# Patient Record
Sex: MALE | Race: WHITE | NOT HISPANIC OR LATINO | Employment: OTHER | URBAN - METROPOLITAN AREA
[De-identification: names, ages, dates, MRNs, and addresses within clinical notes are randomized per-mention and may not be internally consistent; named-entity substitution may affect disease eponyms.]

---

## 2023-03-03 PROBLEM — L03.90 CELLULITIS: Status: ACTIVE | Noted: 2023-03-03

## 2023-03-03 PROBLEM — E11.9 DIABETES MELLITUS (MULTI): Status: ACTIVE | Noted: 2023-03-03

## 2023-03-03 PROBLEM — G62.9 PERIPHERAL NEUROPATHY: Status: ACTIVE | Noted: 2023-03-03

## 2023-03-03 PROBLEM — I10 HTN (HYPERTENSION): Status: ACTIVE | Noted: 2023-03-03

## 2023-03-03 PROBLEM — R11.2 NAUSEA AND/OR VOMITING: Status: ACTIVE | Noted: 2023-03-03

## 2023-03-03 PROBLEM — K22.0 ACHALASIA, ESOPHAGEAL: Status: ACTIVE | Noted: 2023-03-03

## 2023-03-03 PROBLEM — R42 DIZZINESS: Status: ACTIVE | Noted: 2023-03-03

## 2023-03-03 PROBLEM — I95.9 HYPOTENSION: Status: ACTIVE | Noted: 2023-03-03

## 2023-03-03 PROBLEM — I50.30 (HFPEF) HEART FAILURE WITH PRESERVED EJECTION FRACTION (MULTI): Status: ACTIVE | Noted: 2023-03-03

## 2023-03-03 PROBLEM — Z96.1 PSEUDOPHAKIA OF LEFT EYE: Status: ACTIVE | Noted: 2023-03-03

## 2023-03-03 PROBLEM — H33.302 RETINAL HOLE OR TEAR, LEFT: Status: ACTIVE | Noted: 2023-03-03

## 2023-03-03 PROBLEM — S05.71XA: Status: ACTIVE | Noted: 2023-03-03

## 2023-03-03 PROBLEM — M25.569 PAIN IN JOINT, LOWER LEG: Status: ACTIVE | Noted: 2023-03-03

## 2023-03-03 PROBLEM — E78.2 COMBINED HYPERLIPIDEMIA: Status: ACTIVE | Noted: 2023-03-03

## 2023-03-03 PROBLEM — M79.673 FOOT PAIN: Status: ACTIVE | Noted: 2023-03-03

## 2023-03-03 PROBLEM — K59.09 CONSTIPATION, CHRONIC: Status: ACTIVE | Noted: 2023-03-03

## 2023-03-03 PROBLEM — I48.91 A-FIB (MULTI): Status: ACTIVE | Noted: 2023-03-03

## 2023-03-03 RX ORDER — SERTRALINE HYDROCHLORIDE 200 MG/1
CAPSULE ORAL
COMMUNITY
End: 2023-10-31 | Stop reason: ALTCHOICE

## 2023-03-03 RX ORDER — MAGNESIUM OXIDE 420 MG/1
TABLET ORAL
COMMUNITY
Start: 2022-04-11 | End: 2023-10-31 | Stop reason: SDUPTHER

## 2023-03-03 RX ORDER — SPIRONOLACTONE 50 MG/1
1 TABLET, FILM COATED ORAL DAILY
COMMUNITY
Start: 2022-04-11 | End: 2024-02-22 | Stop reason: HOSPADM

## 2023-03-03 RX ORDER — POTASSIUM CHLORIDE 20 MEQ/1
2 TABLET, EXTENDED RELEASE ORAL 2 TIMES DAILY
COMMUNITY
Start: 2022-04-11

## 2023-03-03 RX ORDER — BISACODYL 10 MG/1
10 SUPPOSITORY RECTAL DAILY PRN
COMMUNITY
End: 2023-10-31 | Stop reason: ALTCHOICE

## 2023-03-03 RX ORDER — DIAZEPAM 5 MG/1
10 TABLET ORAL NIGHTLY PRN
COMMUNITY
Start: 2022-04-11 | End: 2023-03-21 | Stop reason: ALTCHOICE

## 2023-03-03 RX ORDER — TAMSULOSIN HYDROCHLORIDE 0.4 MG/1
0.8 CAPSULE ORAL NIGHTLY
COMMUNITY

## 2023-03-03 RX ORDER — FUROSEMIDE 40 MG/1
1 TABLET ORAL DAILY
COMMUNITY
End: 2023-07-18 | Stop reason: SDUPTHER

## 2023-03-03 RX ORDER — METFORMIN HYDROCHLORIDE 1000 MG/1
1000 TABLET ORAL
COMMUNITY
End: 2023-10-31 | Stop reason: ALTCHOICE

## 2023-03-03 RX ORDER — DIGOXIN 125 MCG
1 TABLET ORAL DAILY
COMMUNITY
Start: 2022-04-11

## 2023-03-03 RX ORDER — CHOLECALCIFEROL (VITAMIN D3) 25 MCG
2 TABLET ORAL DAILY
COMMUNITY
Start: 2022-04-11 | End: 2024-06-02 | Stop reason: HOSPADM

## 2023-03-03 RX ORDER — POLYETHYLENE GLYCOL 3350 17 G/17G
POWDER, FOR SOLUTION ORAL
COMMUNITY
End: 2023-10-31 | Stop reason: ALTCHOICE

## 2023-03-03 RX ORDER — DOCUSATE SODIUM 100 MG/1
2 CAPSULE, LIQUID FILLED ORAL EVERY MORNING
COMMUNITY

## 2023-03-03 RX ORDER — ROSUVASTATIN CALCIUM 20 MG/1
1 TABLET, COATED ORAL NIGHTLY
COMMUNITY
Start: 2022-09-20

## 2023-03-03 RX ORDER — WARFARIN SODIUM 5 MG/1
12.5 TABLET ORAL
COMMUNITY

## 2023-03-03 RX ORDER — OXYCODONE AND ACETAMINOPHEN 5; 325 MG/1; MG/1
1 TABLET ORAL EVERY 8 HOURS PRN
COMMUNITY
End: 2023-07-18

## 2023-03-03 RX ORDER — GABAPENTIN 100 MG/1
100 CAPSULE ORAL 2 TIMES DAILY
COMMUNITY
End: 2023-05-15 | Stop reason: SDUPTHER

## 2023-03-03 RX ORDER — METOLAZONE 2.5 MG/1
1 TABLET ORAL DAILY
COMMUNITY
Start: 2022-06-22 | End: 2023-07-18

## 2023-03-14 LAB
INR IN PPP BY COAGULATION ASSAY: 2 (ref 0.9–1.1)
PROTHROMBIN TIME (PT) IN PPP BY COAGULATION ASSAY: 23.4 SEC (ref 9.8–13.4)

## 2023-03-21 ENCOUNTER — OFFICE VISIT (OUTPATIENT)
Dept: PRIMARY CARE | Facility: CLINIC | Age: 63
End: 2023-03-21
Payer: MEDICARE

## 2023-03-21 VITALS
HEIGHT: 74 IN | OXYGEN SATURATION: 94 % | BODY MASS INDEX: 44.04 KG/M2 | HEART RATE: 63 BPM | DIASTOLIC BLOOD PRESSURE: 60 MMHG | SYSTOLIC BLOOD PRESSURE: 110 MMHG

## 2023-03-21 DIAGNOSIS — R09.82 POST-NASAL DRIP: ICD-10-CM

## 2023-03-21 DIAGNOSIS — E79.0 ELEVATED URIC ACID IN BLOOD: ICD-10-CM

## 2023-03-21 DIAGNOSIS — J06.9 VIRAL UPPER RESPIRATORY TRACT INFECTION: Primary | ICD-10-CM

## 2023-03-21 PROCEDURE — 3078F DIAST BP <80 MM HG: CPT | Performed by: STUDENT IN AN ORGANIZED HEALTH CARE EDUCATION/TRAINING PROGRAM

## 2023-03-21 PROCEDURE — 3074F SYST BP LT 130 MM HG: CPT | Performed by: STUDENT IN AN ORGANIZED HEALTH CARE EDUCATION/TRAINING PROGRAM

## 2023-03-21 PROCEDURE — 99213 OFFICE O/P EST LOW 20 MIN: CPT | Performed by: STUDENT IN AN ORGANIZED HEALTH CARE EDUCATION/TRAINING PROGRAM

## 2023-03-21 PROCEDURE — 3051F HG A1C>EQUAL 7.0%<8.0%: CPT | Performed by: STUDENT IN AN ORGANIZED HEALTH CARE EDUCATION/TRAINING PROGRAM

## 2023-03-21 PROCEDURE — 1036F TOBACCO NON-USER: CPT | Performed by: STUDENT IN AN ORGANIZED HEALTH CARE EDUCATION/TRAINING PROGRAM

## 2023-03-21 RX ORDER — LORATADINE 10 MG/1
10 TABLET ORAL DAILY
Qty: 90 TABLET | Refills: 1 | Status: SHIPPED
Start: 2023-03-21 | End: 2023-10-31 | Stop reason: ALTCHOICE

## 2023-03-21 RX ORDER — POTASSIUM CITRATE 10 MEQ/1
TABLET, EXTENDED RELEASE ORAL
COMMUNITY
End: 2023-07-18 | Stop reason: ALTCHOICE

## 2023-03-21 RX ORDER — ONDANSETRON 4 MG/1
TABLET, ORALLY DISINTEGRATING ORAL
COMMUNITY
Start: 2022-09-18 | End: 2023-10-31 | Stop reason: ALTCHOICE

## 2023-03-21 RX ORDER — FLUTICASONE PROPIONATE 50 MCG
2 SPRAY, SUSPENSION (ML) NASAL DAILY
Qty: 16 G | Refills: 2 | Status: SHIPPED
Start: 2023-03-21 | End: 2023-10-31 | Stop reason: ALTCHOICE

## 2023-03-21 RX ORDER — AMOXICILLIN AND CLAVULANATE POTASSIUM 875; 125 MG/1; MG/1
875 TABLET, FILM COATED ORAL 2 TIMES DAILY
Qty: 14 TABLET | Refills: 0 | Status: SHIPPED | OUTPATIENT
Start: 2023-03-21 | End: 2023-03-28

## 2023-03-21 RX ORDER — MULTIVITAMIN
1 TABLET ORAL DAILY
COMMUNITY

## 2023-03-21 ASSESSMENT — ENCOUNTER SYMPTOMS
CARDIOVASCULAR NEGATIVE: 1
SINUS PAIN: 1
PSYCHIATRIC NEGATIVE: 1
RESPIRATORY NEGATIVE: 1
FACIAL SWELLING: 1
CONSTITUTIONAL NEGATIVE: 1
MUSCULOSKELETAL NEGATIVE: 1
SINUS PRESSURE: 1
GASTROINTESTINAL NEGATIVE: 1
NEUROLOGICAL NEGATIVE: 1

## 2023-03-21 NOTE — PROGRESS NOTES
Subjective   Patient ID: Nasir Sandoval is a 62 y.o. male who presents for Follow-up (URIC ACID LEVEL IS ELEVATED).    Patient seen on evaluation.  Regards that he has a couple of questions and concerns.  States that his uric acid levels have been elevated.  Has concerns about potential gout.  He regards that he has not had any gout flares though.  Would like his levels rechecked.  Has been trying to avoid uric acid rich foods as he used to get uric acid stones.  Also regards some sinus pressure and congestion.  Does have a history of sinusitis.  Reports some sinus pressure.  Regards no additional issues or concerns         Review of Systems   Constitutional: Negative.    HENT:  Positive for ear discharge, facial swelling, postnasal drip, sinus pressure and sinus pain.    Respiratory: Negative.     Cardiovascular: Negative.    Gastrointestinal: Negative.    Musculoskeletal: Negative.    Neurological: Negative.    Psychiatric/Behavioral: Negative.         Objective   There were no vitals taken for this visit.    Physical Exam  Vitals and nursing note reviewed.   Constitutional:       General: He is not in acute distress.     Appearance: Normal appearance.   HENT:      Mouth/Throat:      Mouth: Mucous membranes are moist.      Pharynx: No oropharyngeal exudate.      Comments: Sinus pressure, congestion  Eyes:      Extraocular Movements: Extraocular movements intact.      Pupils: Pupils are equal, round, and reactive to light.   Cardiovascular:      Rate and Rhythm: Normal rate and regular rhythm.      Pulses: Normal pulses.      Heart sounds: Normal heart sounds. No murmur heard.  Pulmonary:      Effort: Pulmonary effort is normal. No respiratory distress.   Abdominal:      General: Abdomen is flat. Bowel sounds are normal. There is no distension.      Tenderness: There is no abdominal tenderness.   Musculoskeletal:         General: Normal range of motion.      Right lower leg: No edema.      Left lower leg: No edema.    Skin:     General: Skin is warm and dry.      Capillary Refill: Capillary refill takes less than 2 seconds.   Neurological:      General: No focal deficit present.      Mental Status: He is alert. Mental status is at baseline.      Cranial Nerves: No cranial nerve deficit.      Motor: No weakness.   Psychiatric:         Mood and Affect: Mood normal.         Thought Content: Thought content normal.         Assessment/Plan   Problem List Items Addressed This Visit    None  Visit Diagnoses       Viral upper respiratory tract infection    -  Primary    Relevant Medications    fluticasone (Flonase) 50 mcg/actuation nasal spray    loratadine (Claritin) 10 mg tablet    Post-nasal drip        Relevant Medications    amoxicillin-pot clavulanate (Augmentin) 875-125 mg tablet    Elevated uric acid in blood        Relevant Orders    Uric acid             PT seen on follow up    #URI  Suspected has been having longesvitiy of symptoms  Recommend fluticasone, claritin, advise augmentin if no improvement  Could consider ENT referral    #elevated uric acid  #gout  Noted elevated at 11.1 on last examination, recommend repeat checking could consider allopurinol or antigout gout medication in future, will discuss with labs    #Diabetes  #Insomnia  #Neuropathy  #Hypertension  #BPH  [facility], encourage management, he does inquire about insomnia medicine such as Valium, was advised doxepin by psychiatrist advised him to discuss with his at home psychiatrist    #Health maintenance  Labs reviewed  Advise age-appropriate vaccinations  Up-to-date on routine testing    Return to care in 3 to 4 months or as needed

## 2023-04-05 ENCOUNTER — TELEPHONE (OUTPATIENT)
Dept: PRIMARY CARE | Facility: CLINIC | Age: 63
End: 2023-04-05
Payer: MEDICARE

## 2023-04-05 DIAGNOSIS — R42 DIZZINESS: Primary | ICD-10-CM

## 2023-04-05 NOTE — TELEPHONE ENCOUNTER
Patient asking for a referral to neurology due to dizziness, he says he spoke to you about it at his visit

## 2023-04-10 ENCOUNTER — LAB (OUTPATIENT)
Dept: LAB | Facility: LAB | Age: 63
End: 2023-04-10
Payer: MEDICARE

## 2023-04-10 DIAGNOSIS — E79.0 ELEVATED URIC ACID IN BLOOD: ICD-10-CM

## 2023-04-10 LAB
ALBUMIN (G/DL) IN SER/PLAS: 4.3 G/DL (ref 3.4–5)
ANION GAP IN SER/PLAS: 19 MMOL/L (ref 10–20)
CALCIUM (MG/DL) IN SER/PLAS: 9.7 MG/DL (ref 8.6–10.3)
CARBON DIOXIDE, TOTAL (MMOL/L) IN SER/PLAS: 30 MMOL/L (ref 21–32)
CHLORIDE (MMOL/L) IN SER/PLAS: 95 MMOL/L (ref 98–107)
CREATININE (MG/DL) IN SER/PLAS: 1.32 MG/DL (ref 0.5–1.3)
GFR MALE: 61 ML/MIN/1.73M2
GLUCOSE (MG/DL) IN SER/PLAS: 87 MG/DL (ref 74–99)
INR IN PPP BY COAGULATION ASSAY: 2 (ref 0.9–1.1)
PHOSPHATE (MG/DL) IN SER/PLAS: 4 MG/DL (ref 2.5–4.9)
POTASSIUM (MMOL/L) IN SER/PLAS: 3.5 MMOL/L (ref 3.5–5.3)
PROTHROMBIN TIME (PT) IN PPP BY COAGULATION ASSAY: 23.5 SEC (ref 9.8–13.4)
SODIUM (MMOL/L) IN SER/PLAS: 140 MMOL/L (ref 136–145)
URATE (MG/DL) IN SER/PLAS: 12 MG/DL (ref 4–7.5)
UREA NITROGEN (MG/DL) IN SER/PLAS: 36 MG/DL (ref 6–23)

## 2023-04-10 PROCEDURE — 84550 ASSAY OF BLOOD/URIC ACID: CPT

## 2023-04-10 PROCEDURE — 36415 COLL VENOUS BLD VENIPUNCTURE: CPT | Performed by: STUDENT IN AN ORGANIZED HEALTH CARE EDUCATION/TRAINING PROGRAM

## 2023-04-17 ENCOUNTER — TELEPHONE (OUTPATIENT)
Dept: PRIMARY CARE | Facility: CLINIC | Age: 63
End: 2023-04-17
Payer: MEDICARE

## 2023-04-17 DIAGNOSIS — R21 RASH: Primary | ICD-10-CM

## 2023-04-17 DIAGNOSIS — M10.9 GOUT, UNSPECIFIED CAUSE, UNSPECIFIED CHRONICITY, UNSPECIFIED SITE: ICD-10-CM

## 2023-04-18 RX ORDER — BETAMETHASONE DIPROPIONATE 0.5 MG/G
LOTION TOPICAL 2 TIMES DAILY PRN
Qty: 60 ML | Refills: 1 | Status: SHIPPED | OUTPATIENT
Start: 2023-04-18 | End: 2023-07-18 | Stop reason: SDUPTHER

## 2023-04-18 RX ORDER — ALLOPURINOL 100 MG/1
100 TABLET ORAL DAILY
Qty: 90 TABLET | Refills: 0 | Status: SHIPPED | OUTPATIENT
Start: 2023-04-18 | End: 2023-05-15 | Stop reason: SDUPTHER

## 2023-05-02 ENCOUNTER — TELEPHONE (OUTPATIENT)
Dept: PRIMARY CARE | Facility: CLINIC | Age: 63
End: 2023-05-02
Payer: MEDICARE

## 2023-05-02 DIAGNOSIS — L98.9 SKIN LESION: Primary | ICD-10-CM

## 2023-05-02 NOTE — TELEPHONE ENCOUNTER
Saw you 3/21/23. Wants referral to dermatology for rash/spots on arms and wants repeat Uric acid test.

## 2023-05-08 PROBLEM — G47.33 OSA ON CPAP: Status: ACTIVE | Noted: 2023-05-08

## 2023-05-08 RX ORDER — AMMONIUM LACTATE 12 G/100G
1 CREAM TOPICAL AS NEEDED
COMMUNITY
Start: 2022-05-09

## 2023-05-08 RX ORDER — METOPROLOL TARTRATE 100 MG/1
TABLET ORAL EVERY 12 HOURS
COMMUNITY
End: 2023-10-31 | Stop reason: ALTCHOICE

## 2023-05-08 RX ORDER — DIGOXIN 250 MCG
250 TABLET ORAL
COMMUNITY
End: 2023-11-15 | Stop reason: WASHOUT

## 2023-05-08 RX ORDER — METOPROLOL SUCCINATE 100 MG/1
100 TABLET, EXTENDED RELEASE ORAL 2 TIMES DAILY
COMMUNITY
End: 2023-10-31 | Stop reason: ALTCHOICE

## 2023-05-08 RX ORDER — SIMVASTATIN 80 MG/1
80 TABLET, FILM COATED ORAL
COMMUNITY
End: 2023-10-31 | Stop reason: ALTCHOICE

## 2023-05-08 RX ORDER — DICYCLOMINE HYDROCHLORIDE 20 MG/1
20 TABLET ORAL
COMMUNITY
Start: 2017-03-29 | End: 2023-10-31 | Stop reason: ALTCHOICE

## 2023-05-08 RX ORDER — HYDROCHLOROTHIAZIDE 25 MG/1
25 TABLET ORAL
COMMUNITY
End: 2023-10-31 | Stop reason: ALTCHOICE

## 2023-05-08 RX ORDER — MUPIROCIN 20 MG/G
OINTMENT TOPICAL
COMMUNITY
Start: 2023-02-24 | End: 2023-10-31 | Stop reason: ALTCHOICE

## 2023-05-08 RX ORDER — AMOXICILLIN AND CLAVULANATE POTASSIUM 875; 125 MG/1; MG/1
TABLET, FILM COATED ORAL
COMMUNITY
Start: 2023-03-21 | End: 2023-05-09 | Stop reason: ALTCHOICE

## 2023-05-08 RX ORDER — COLCHICINE 0.6 MG/1
0.6 TABLET ORAL 2 TIMES DAILY
COMMUNITY
Start: 2023-01-10 | End: 2023-10-31 | Stop reason: ALTCHOICE

## 2023-05-08 RX ORDER — CARBOXYMETHYLCELLULOSE SODIUM 5 MG/ML
1 SOLUTION/ DROPS OPHTHALMIC 3 TIMES DAILY PRN
COMMUNITY
Start: 2022-05-02

## 2023-05-08 RX ORDER — WARFARIN 10 MG/1
10 TABLET ORAL
COMMUNITY
Start: 2013-07-31

## 2023-05-08 RX ORDER — LEVOFLOXACIN 750 MG/1
TABLET ORAL
COMMUNITY
Start: 2022-09-21 | End: 2024-02-22 | Stop reason: HOSPADM

## 2023-05-08 RX ORDER — VENLAFAXINE HYDROCHLORIDE 75 MG/1
CAPSULE, EXTENDED RELEASE ORAL
COMMUNITY
End: 2023-10-31 | Stop reason: ALTCHOICE

## 2023-05-08 RX ORDER — GLUCOSAM/CHONDRO/HERB 149/HYAL 750-100 MG
TABLET ORAL
COMMUNITY
End: 2024-02-22 | Stop reason: HOSPADM

## 2023-05-08 RX ORDER — POTASSIUM CHLORIDE 750 MG/1
10 TABLET, FILM COATED, EXTENDED RELEASE ORAL
COMMUNITY
Start: 2016-09-23 | End: 2023-10-31 | Stop reason: ALTCHOICE

## 2023-05-08 RX ORDER — ACETAMINOPHEN 325 MG/1
650 TABLET ORAL
COMMUNITY
Start: 2022-01-07 | End: 2023-10-31 | Stop reason: ALTCHOICE

## 2023-05-08 RX ORDER — POTASSIUM CITRATE, TRISODIUM CITRATE DIHYDRATE AND CITRIC ACID MONOHYDRATE 550; 500; 334 MG/5ML; MG/5ML; MG/5ML
15 SOLUTION ORAL
COMMUNITY
End: 2023-10-31 | Stop reason: ALTCHOICE

## 2023-05-08 RX ORDER — KETOCONAZOLE 20 MG/ML
SHAMPOO, SUSPENSION TOPICAL
COMMUNITY
Start: 2022-05-09

## 2023-05-08 RX ORDER — ALBUTEROL SULFATE 90 UG/1
2 AEROSOL, METERED RESPIRATORY (INHALATION) EVERY 6 HOURS PRN
COMMUNITY

## 2023-05-08 RX ORDER — DIAZEPAM 5 MG/1
5 TABLET ORAL
COMMUNITY
Start: 2022-04-11 | End: 2023-07-18

## 2023-05-08 RX ORDER — VALSARTAN 40 MG/1
20 TABLET ORAL
COMMUNITY
Start: 2022-06-01 | End: 2023-10-31 | Stop reason: ALTCHOICE

## 2023-05-08 RX ORDER — LOSARTAN POTASSIUM 100 MG/1
100 TABLET ORAL
COMMUNITY
End: 2023-10-31 | Stop reason: ALTCHOICE

## 2023-05-08 RX ORDER — ERYTHROMYCIN 5 MG/G
OINTMENT OPHTHALMIC
COMMUNITY
Start: 2022-05-02 | End: 2024-02-22 | Stop reason: HOSPADM

## 2023-05-08 RX ORDER — CETIRIZINE HYDROCHLORIDE 5 MG/1
1 TABLET ORAL DAILY
COMMUNITY
Start: 2022-06-27

## 2023-05-08 RX ORDER — HYDRALAZINE HYDROCHLORIDE 10 MG/1
10 TABLET, FILM COATED ORAL 3 TIMES DAILY
COMMUNITY
End: 2023-10-31 | Stop reason: ALTCHOICE

## 2023-05-08 RX ORDER — CLINDAMYCIN PHOSPHATE 11.9 MG/ML
SOLUTION TOPICAL
COMMUNITY
Start: 2022-05-09

## 2023-05-08 RX ORDER — SERTRALINE HYDROCHLORIDE 100 MG/1
1 TABLET, FILM COATED ORAL
COMMUNITY
Start: 2022-10-21 | End: 2023-10-31 | Stop reason: ALTCHOICE

## 2023-05-08 RX ORDER — HYDRALAZINE HYDROCHLORIDE 25 MG/1
75 TABLET, FILM COATED ORAL 3 TIMES DAILY
COMMUNITY
End: 2023-10-31 | Stop reason: ALTCHOICE

## 2023-05-08 RX ORDER — SIMVASTATIN 10 MG/1
10 TABLET, FILM COATED ORAL
COMMUNITY
End: 2023-10-31 | Stop reason: ALTCHOICE

## 2023-05-08 RX ORDER — GLIPIZIDE 10 MG/1
10 TABLET ORAL
COMMUNITY

## 2023-05-08 RX ORDER — LANOLIN ALCOHOL/MO/W.PET/CERES
400 CREAM (GRAM) TOPICAL 2 TIMES DAILY
COMMUNITY
Start: 2016-09-23

## 2023-05-08 RX ORDER — METOPROLOL SUCCINATE 25 MG/1
25 TABLET, EXTENDED RELEASE ORAL
COMMUNITY
End: 2023-10-31

## 2023-05-08 RX ORDER — BACITRACIN 500 [USP'U]/G
1 OINTMENT TOPICAL 2 TIMES DAILY
COMMUNITY
Start: 2022-04-19 | End: 2024-06-02 | Stop reason: HOSPADM

## 2023-05-08 RX ORDER — OMEPRAZOLE 20 MG/1
20 CAPSULE, DELAYED RELEASE ORAL
COMMUNITY
End: 2023-10-31 | Stop reason: ALTCHOICE

## 2023-05-09 ENCOUNTER — TELEMEDICINE (OUTPATIENT)
Dept: PRIMARY CARE | Facility: CLINIC | Age: 63
End: 2023-05-09
Payer: MEDICARE

## 2023-05-09 DIAGNOSIS — U07.1 COVID-19: Primary | ICD-10-CM

## 2023-05-09 PROCEDURE — 99442 PR PHYS/QHP TELEPHONE EVALUATION 11-20 MIN: CPT | Performed by: STUDENT IN AN ORGANIZED HEALTH CARE EDUCATION/TRAINING PROGRAM

## 2023-05-09 RX ORDER — ONDANSETRON 4 MG/1
4 TABLET, FILM COATED ORAL EVERY 8 HOURS PRN
Qty: 30 TABLET | Refills: 0 | Status: SHIPPED | OUTPATIENT
Start: 2023-05-09 | End: 2023-05-19

## 2023-05-09 RX ORDER — AMOXICILLIN AND CLAVULANATE POTASSIUM 875; 125 MG/1; MG/1
875 TABLET, FILM COATED ORAL 2 TIMES DAILY
Qty: 14 TABLET | Refills: 0 | Status: SHIPPED | OUTPATIENT
Start: 2023-05-09 | End: 2023-05-16

## 2023-05-09 NOTE — PROGRESS NOTES
Subjective   Patient ID: Nasir Sandoval is a 62 y.o. male who presents for No chief complaint on file..    HPI virtual visit for follow-up.  Tested positive for COVID yesterday, first time having COVID.  Symptoms that led to testing including vertigo, joint pains, nonproductive cough, occasionally goes in the coughing fits will lead to vomiting/been having ear pain that started a couple of days before testing positive for COVID.  Is also been having headaches.  Wife is a nurse and took vitals and blood sugar as below    T 97.3  321lbs  107/67  84    94%    Review of Systems  8 point review of systems is otherwise negative unless mentioned on HPI      Objective   There were no vitals taken for this visit.    Physical Exam  Virtual visit turned into telephone visit     Assessment/Plan        COVID-19  Possible otitis media  Nausea  -Start Paxlovid twice daily x5 days  -Start Augmentin twice daily x7 days  -Zofran as needed    RTC per previously determined interval    This note was dictated by speech recognition. Minor errors in transcription may be present.

## 2023-05-15 DIAGNOSIS — M10.9 GOUT, UNSPECIFIED CAUSE, UNSPECIFIED CHRONICITY, UNSPECIFIED SITE: ICD-10-CM

## 2023-05-15 RX ORDER — GABAPENTIN 100 MG/1
100 CAPSULE ORAL 2 TIMES DAILY
Qty: 60 CAPSULE | Refills: 0 | Status: SHIPPED
Start: 2023-05-15 | End: 2023-07-18

## 2023-05-15 RX ORDER — ALLOPURINOL 100 MG/1
100 TABLET ORAL DAILY
Qty: 90 TABLET | Refills: 0 | Status: SHIPPED | OUTPATIENT
Start: 2023-05-15 | End: 2023-08-13

## 2023-05-19 LAB
INR IN PPP BY COAGULATION ASSAY: 1.6 (ref 0.9–1.1)
PROTHROMBIN TIME (PT) IN PPP BY COAGULATION ASSAY: 18.4 SEC (ref 9.8–13.4)

## 2023-05-23 ENCOUNTER — APPOINTMENT (OUTPATIENT)
Dept: PRIMARY CARE | Facility: CLINIC | Age: 63
End: 2023-05-23
Payer: MEDICARE

## 2023-05-26 ENCOUNTER — APPOINTMENT (OUTPATIENT)
Dept: PRIMARY CARE | Facility: CLINIC | Age: 63
End: 2023-05-26
Payer: MEDICARE

## 2023-06-09 LAB
ANION GAP IN SER/PLAS: 12 MMOL/L (ref 10–20)
CALCIUM (MG/DL) IN SER/PLAS: 9.1 MG/DL (ref 8.6–10.3)
CARBON DIOXIDE, TOTAL (MMOL/L) IN SER/PLAS: 29 MMOL/L (ref 21–32)
CHLORIDE (MMOL/L) IN SER/PLAS: 102 MMOL/L (ref 98–107)
CREATININE (MG/DL) IN SER/PLAS: 1.1 MG/DL (ref 0.5–1.3)
ERYTHROCYTE DISTRIBUTION WIDTH (RATIO) BY AUTOMATED COUNT: 16.6 % (ref 11.5–14.5)
ERYTHROCYTE MEAN CORPUSCULAR HEMOGLOBIN CONCENTRATION (G/DL) BY AUTOMATED: 32.5 G/DL (ref 32–36)
ERYTHROCYTE MEAN CORPUSCULAR VOLUME (FL) BY AUTOMATED COUNT: 97 FL (ref 80–100)
ERYTHROCYTES (10*6/UL) IN BLOOD BY AUTOMATED COUNT: 4.26 X10E12/L (ref 4.5–5.9)
GFR MALE: 76 ML/MIN/1.73M2
GLUCOSE (MG/DL) IN SER/PLAS: 134 MG/DL (ref 74–99)
HEMATOCRIT (%) IN BLOOD BY AUTOMATED COUNT: 41.5 % (ref 41–52)
HEMOGLOBIN (G/DL) IN BLOOD: 13.5 G/DL (ref 13.5–17.5)
INR IN PPP BY COAGULATION ASSAY: 3.2 (ref 0.9–1.1)
LEUKOCYTES (10*3/UL) IN BLOOD BY AUTOMATED COUNT: 7.9 X10E9/L (ref 4.4–11.3)
MAGNESIUM (MG/DL) IN SER/PLAS: 1.84 MG/DL (ref 1.6–2.4)
NATRIURETIC PEPTIDE B (PG/ML) IN SER/PLAS: 9 PG/ML (ref 0–99)
NRBC (PER 100 WBCS) BY AUTOMATED COUNT: 0 /100 WBC (ref 0–0)
PLATELETS (10*3/UL) IN BLOOD AUTOMATED COUNT: 216 X10E9/L (ref 150–450)
POTASSIUM (MMOL/L) IN SER/PLAS: 4.3 MMOL/L (ref 3.5–5.3)
PROTHROMBIN TIME (PT) IN PPP BY COAGULATION ASSAY: 37.6 SEC (ref 9.8–13.4)
SODIUM (MMOL/L) IN SER/PLAS: 139 MMOL/L (ref 136–145)
UREA NITROGEN (MG/DL) IN SER/PLAS: 20 MG/DL (ref 6–23)

## 2023-06-28 ENCOUNTER — TELEPHONE (OUTPATIENT)
Dept: PRIMARY CARE | Facility: CLINIC | Age: 63
End: 2023-06-28
Payer: MEDICARE

## 2023-06-28 DIAGNOSIS — M10.9 GOUT INVOLVING TOE, UNSPECIFIED CAUSE, UNSPECIFIED CHRONICITY, UNSPECIFIED LATERALITY: Primary | ICD-10-CM

## 2023-07-03 LAB
INR IN PPP BY COAGULATION ASSAY: 2.5 (ref 0.9–1.1)
PROTHROMBIN TIME (PT) IN PPP BY COAGULATION ASSAY: 28.9 SEC (ref 9.8–12.8)

## 2023-07-18 ENCOUNTER — LAB (OUTPATIENT)
Dept: LAB | Facility: LAB | Age: 63
End: 2023-07-18
Payer: MEDICARE

## 2023-07-18 ENCOUNTER — OFFICE VISIT (OUTPATIENT)
Dept: PRIMARY CARE | Facility: CLINIC | Age: 63
End: 2023-07-18
Payer: MEDICARE

## 2023-07-18 VITALS — SYSTOLIC BLOOD PRESSURE: 144 MMHG | DIASTOLIC BLOOD PRESSURE: 88 MMHG | BODY MASS INDEX: 44.68 KG/M2 | WEIGHT: 315 LBS

## 2023-07-18 DIAGNOSIS — E61.1 IRON DEFICIENCY ASSOCIATED WITH NONFAMILIAL RESTLESS LEGS SYNDROME: ICD-10-CM

## 2023-07-18 DIAGNOSIS — R21 RASH: ICD-10-CM

## 2023-07-18 DIAGNOSIS — I48.19 PERSISTENT ATRIAL FIBRILLATION (MULTI): ICD-10-CM

## 2023-07-18 DIAGNOSIS — G25.81 IRON DEFICIENCY ASSOCIATED WITH NONFAMILIAL RESTLESS LEGS SYNDROME: ICD-10-CM

## 2023-07-18 DIAGNOSIS — I50.30 HEART FAILURE WITH PRESERVED EJECTION FRACTION, UNSPECIFIED HF CHRONICITY (MULTI): ICD-10-CM

## 2023-07-18 DIAGNOSIS — G62.9 NEUROPATHY: Primary | ICD-10-CM

## 2023-07-18 DIAGNOSIS — D64.9 ANEMIA, UNSPECIFIED TYPE: ICD-10-CM

## 2023-07-18 DIAGNOSIS — F43.10 PTSD (POST-TRAUMATIC STRESS DISORDER): ICD-10-CM

## 2023-07-18 DIAGNOSIS — I48.0 PAROXYSMAL ATRIAL FIBRILLATION (MULTI): ICD-10-CM

## 2023-07-18 DIAGNOSIS — R53.83 OTHER FATIGUE: ICD-10-CM

## 2023-07-18 DIAGNOSIS — G62.9 NEUROPATHY: ICD-10-CM

## 2023-07-18 DIAGNOSIS — M10.9 GOUT INVOLVING TOE, UNSPECIFIED CAUSE, UNSPECIFIED CHRONICITY, UNSPECIFIED LATERALITY: ICD-10-CM

## 2023-07-18 DIAGNOSIS — L98.9 SKIN LESION: ICD-10-CM

## 2023-07-18 PROBLEM — H33.22 RD (RETINAL DETACHMENT), LEFT: Status: ACTIVE | Noted: 2023-03-03

## 2023-07-18 LAB
ALANINE AMINOTRANSFERASE (SGPT) (U/L) IN SER/PLAS: 22 U/L (ref 10–52)
ALBUMIN (G/DL) IN SER/PLAS: 4.6 G/DL (ref 3.4–5)
ALKALINE PHOSPHATASE (U/L) IN SER/PLAS: 63 U/L (ref 33–136)
ANION GAP IN SER/PLAS: 15 MMOL/L (ref 10–20)
ASPARTATE AMINOTRANSFERASE (SGOT) (U/L) IN SER/PLAS: 17 U/L (ref 9–39)
BILIRUBIN TOTAL (MG/DL) IN SER/PLAS: 0.3 MG/DL (ref 0–1.2)
CALCIUM (MG/DL) IN SER/PLAS: 10.3 MG/DL (ref 8.6–10.6)
CARBON DIOXIDE, TOTAL (MMOL/L) IN SER/PLAS: 29 MMOL/L (ref 21–32)
CHLORIDE (MMOL/L) IN SER/PLAS: 102 MMOL/L (ref 98–107)
COBALAMIN (VITAMIN B12) (PG/ML) IN SER/PLAS: 472 PG/ML (ref 211–911)
CREATININE (MG/DL) IN SER/PLAS: 0.99 MG/DL (ref 0.5–1.3)
FERRITIN (UG/LL) IN SER/PLAS: 34 UG/L (ref 20–300)
FOLATE (NG/ML) IN SER/PLAS: 21.9 NG/ML
FOLLITROPIN (IU/L) IN SER/PLAS: 7.6 IU/L
GFR MALE: 86 ML/MIN/1.73M2
GLUCOSE (MG/DL) IN SER/PLAS: 109 MG/DL (ref 74–99)
IRON (UG/DL) IN SER/PLAS: 97 UG/DL (ref 35–150)
IRON BINDING CAPACITY (UG/DL) IN SER/PLAS: 475 UG/DL (ref 240–445)
IRON SATURATION (%) IN SER/PLAS: 20 % (ref 25–45)
LUTEINIZING HORMONE (IU/ML) IN SER/PLAS: 7 IU/L
MAGNESIUM (MG/DL) IN SER/PLAS: 2 MG/DL (ref 1.6–2.4)
POTASSIUM (MMOL/L) IN SER/PLAS: 4.7 MMOL/L (ref 3.5–5.3)
PROTEIN TOTAL: 8.2 G/DL (ref 6.4–8.2)
SODIUM (MMOL/L) IN SER/PLAS: 141 MMOL/L (ref 136–145)
TESTOSTERONE (NG/DL) IN SER/PLAS: 100 NG/DL (ref 240–1000)
THYROTROPIN (MIU/L) IN SER/PLAS BY DETECTION LIMIT <= 0.05 MIU/L: 0.63 MIU/L (ref 0.44–3.98)
URATE (MG/DL) IN SER/PLAS: 6.4 MG/DL (ref 4–7.5)
UREA NITROGEN (MG/DL) IN SER/PLAS: 26 MG/DL (ref 6–23)

## 2023-07-18 PROCEDURE — 3051F HG A1C>EQUAL 7.0%<8.0%: CPT | Performed by: STUDENT IN AN ORGANIZED HEALTH CARE EDUCATION/TRAINING PROGRAM

## 2023-07-18 PROCEDURE — 36415 COLL VENOUS BLD VENIPUNCTURE: CPT

## 2023-07-18 PROCEDURE — 83540 ASSAY OF IRON: CPT

## 2023-07-18 PROCEDURE — 83002 ASSAY OF GONADOTROPIN (LH): CPT

## 2023-07-18 PROCEDURE — 84443 ASSAY THYROID STIM HORMONE: CPT

## 2023-07-18 PROCEDURE — 82607 VITAMIN B-12: CPT

## 2023-07-18 PROCEDURE — 1036F TOBACCO NON-USER: CPT | Performed by: STUDENT IN AN ORGANIZED HEALTH CARE EDUCATION/TRAINING PROGRAM

## 2023-07-18 PROCEDURE — 99214 OFFICE O/P EST MOD 30 MIN: CPT | Performed by: STUDENT IN AN ORGANIZED HEALTH CARE EDUCATION/TRAINING PROGRAM

## 2023-07-18 PROCEDURE — 82746 ASSAY OF FOLIC ACID SERUM: CPT

## 2023-07-18 PROCEDURE — 3077F SYST BP >= 140 MM HG: CPT | Performed by: STUDENT IN AN ORGANIZED HEALTH CARE EDUCATION/TRAINING PROGRAM

## 2023-07-18 PROCEDURE — 4010F ACE/ARB THERAPY RXD/TAKEN: CPT | Performed by: STUDENT IN AN ORGANIZED HEALTH CARE EDUCATION/TRAINING PROGRAM

## 2023-07-18 PROCEDURE — 80053 COMPREHEN METABOLIC PANEL: CPT

## 2023-07-18 PROCEDURE — 82728 ASSAY OF FERRITIN: CPT

## 2023-07-18 PROCEDURE — 84550 ASSAY OF BLOOD/URIC ACID: CPT

## 2023-07-18 PROCEDURE — 83550 IRON BINDING TEST: CPT

## 2023-07-18 PROCEDURE — 84403 ASSAY OF TOTAL TESTOSTERONE: CPT

## 2023-07-18 PROCEDURE — 3079F DIAST BP 80-89 MM HG: CPT | Performed by: STUDENT IN AN ORGANIZED HEALTH CARE EDUCATION/TRAINING PROGRAM

## 2023-07-18 PROCEDURE — 83735 ASSAY OF MAGNESIUM: CPT

## 2023-07-18 PROCEDURE — 83001 ASSAY OF GONADOTROPIN (FSH): CPT

## 2023-07-18 RX ORDER — BETAMETHASONE DIPROPIONATE 0.5 MG/G
LOTION TOPICAL 2 TIMES DAILY PRN
Qty: 60 ML | Refills: 2 | Status: SHIPPED | OUTPATIENT
Start: 2023-07-18 | End: 2023-11-15

## 2023-07-18 RX ORDER — PRAZOSIN HYDROCHLORIDE 2 MG/1
2 CAPSULE ORAL NIGHTLY
Qty: 90 CAPSULE | Refills: 0 | Status: SHIPPED
Start: 2023-07-18 | End: 2024-02-22 | Stop reason: HOSPADM

## 2023-07-18 RX ORDER — FUROSEMIDE 40 MG/1
40 TABLET ORAL 2 TIMES DAILY
Qty: 180 TABLET | Refills: 0 | Status: SHIPPED
Start: 2023-07-18 | End: 2023-08-01 | Stop reason: SDUPTHER

## 2023-07-18 RX ORDER — GABAPENTIN 300 MG/1
300 CAPSULE ORAL 3 TIMES DAILY
Qty: 270 CAPSULE | Refills: 0 | Status: SHIPPED
Start: 2023-07-18 | End: 2023-10-25 | Stop reason: DRUGHIGH

## 2023-07-18 ASSESSMENT — ENCOUNTER SYMPTOMS
PSYCHIATRIC NEGATIVE: 1
CONSTIPATION: 0
RESPIRATORY NEGATIVE: 1
ANAL BLEEDING: 0
ABDOMINAL PAIN: 0
DIARRHEA: 0
MUSCULOSKELETAL NEGATIVE: 1
CONSTITUTIONAL NEGATIVE: 1
ABDOMINAL DISTENTION: 1
NEUROLOGICAL NEGATIVE: 1

## 2023-07-18 NOTE — PROGRESS NOTES
Follow up and needs to discuss medication    Subjective   Patient ID: Nasir Sandoval is a 63 y.o. male.    Patient seen on follow-up.  States overall is doing well, reports that he recently had tested positive for COVID-19, and states since then he has gained a bunch of weight.  Has been taking his diuretics  however has started noticing increasing swelling especially in his abdomen.  Was recently taken off metolazone due to side effect profile.  Otherwise, reports that he has no worsening neuropathy.  Is on a very low-dose of gabapentin.  Still getting rashes however has been unable to follow with dermatology.  States no additional issues or concerns at this time.      Review of Systems   Constitutional: Negative.    HENT: Negative.     Respiratory: Negative.     Cardiovascular:  Positive for leg swelling.   Gastrointestinal:  Positive for abdominal distention. Negative for abdominal pain, anal bleeding, constipation and diarrhea.   Musculoskeletal: Negative.    Skin: Negative.    Neurological: Negative.    Psychiatric/Behavioral: Negative.         Objective     Visit Vitals  /88   Wt (!) 158 kg (348 lb) Comment: 348lb   BMI 44.68 kg/m²   Smoking Status Former   BSA 2.87 m²      Physical Exam  Vitals and nursing note reviewed.   Constitutional:       General: He is not in acute distress.     Appearance: Normal appearance.   HENT:      Mouth/Throat:      Mouth: Mucous membranes are moist.      Pharynx: Oropharynx is clear. No oropharyngeal exudate.   Eyes:      Extraocular Movements: Extraocular movements intact.      Pupils: Pupils are equal, round, and reactive to light.   Cardiovascular:      Rate and Rhythm: Normal rate and regular rhythm.      Pulses: Normal pulses.      Heart sounds: Normal heart sounds. No murmur heard.  Pulmonary:      Effort: Pulmonary effort is normal. No respiratory distress.   Abdominal:      General: Abdomen is flat. Bowel sounds are normal. There is no distension.      Tenderness:  There is no abdominal tenderness.      Comments: Obese, abdominal swelling   Musculoskeletal:         General: Normal range of motion.      Right lower leg: No edema.      Left lower leg: No edema.   Skin:     General: Skin is warm and dry.      Capillary Refill: Capillary refill takes less than 2 seconds.   Neurological:      General: No focal deficit present.      Mental Status: He is alert. Mental status is at baseline.      Cranial Nerves: No cranial nerve deficit.      Motor: No weakness.   Psychiatric:         Mood and Affect: Mood normal.         Thought Content: Thought content normal.       Assessment/Plan   Diagnoses and all orders for this visit:  Neuropathy  -     Vitamin B12; Future  -     Ferritin; Future  -     Uric acid; Future  -     Folate; Future  -     Comprehensive Metabolic Panel; Future  -     gabapentin (Neurontin) 300 mg capsule; Take 1 capsule (300 mg) by mouth 3 times a day.  Anemia, unspecified type  -     Iron and TIBC; Future  -     Vitamin B12; Future  Heart failure with preserved ejection fraction, unspecified HF chronicity (CMS/HCC)  -     Magnesium; Future  -     furosemide (Lasix) 40 mg tablet; Take 1 tablet (40 mg) by mouth 2 times a day.  Persistent atrial fibrillation (CMS/HCC)  -     furosemide (Lasix) 40 mg tablet; Take 1 tablet (40 mg) by mouth 2 times a day.  Paroxysmal atrial fibrillation (CMS/HCC)  -     Magnesium; Future  -     furosemide (Lasix) 40 mg tablet; Take 1 tablet (40 mg) by mouth 2 times a day.  PTSD (post-traumatic stress disorder)  -     prazosin (Minipress) 2 mg capsule; Take 1 capsule (2 mg) by mouth once daily at bedtime.  Iron deficiency associated with nonfamilial restless legs syndrome  -     Ferritin; Future  Other fatigue  -     Testosterone; Future  -     FSH & LH; Future  -     TSH with reflex to Free T4 if abnormal; Future  Rash  -     betamethasone dipropionate (Diprosone) 0.05 % lotion; Apply topically 2 times a day as needed for irritation or  rash.          PT seen on follow up     #elevated uric acid  #gout  Noted elevated at 11.1 recheck today, encouraged avoiding risk factors    #PTSD  Initiation of Minipress, has weaned himself off Valium    #HFpEF  Advise low-salt diet, increase diuretics, follow-up with cardiology continue all other cardiac meds    #Neuropathy  Could be multifactorial check iron studies, B12 folate, A1c levels, likely multitude of factors playing a role recommend follow-up with pain management for back injections    #Diabetes  #Insomnia  #Neuropathy  #Hypertension  #BPH  Management as above, [facility], monitor has weaned him off sedating medication     #Health maintenance  Labs reviewed  Advise age-appropriate vaccinations  Up-to-date on routine testing     Return to care in 3 to 4 months or as needed

## 2023-07-21 DIAGNOSIS — R79.89 LOW SERUM TESTOSTERONE LEVEL: Primary | ICD-10-CM

## 2023-08-01 DIAGNOSIS — I50.30 HEART FAILURE WITH PRESERVED EJECTION FRACTION, UNSPECIFIED HF CHRONICITY (MULTI): ICD-10-CM

## 2023-08-01 DIAGNOSIS — I48.0 PAROXYSMAL ATRIAL FIBRILLATION (MULTI): ICD-10-CM

## 2023-08-01 DIAGNOSIS — I48.19 PERSISTENT ATRIAL FIBRILLATION (MULTI): ICD-10-CM

## 2023-08-01 LAB
INR IN PPP BY COAGULATION ASSAY: 2.9 (ref 0.9–1.1)
PROTHROMBIN TIME (PT) IN PPP BY COAGULATION ASSAY: 33.4 SEC (ref 9.8–12.8)

## 2023-08-01 RX ORDER — FUROSEMIDE 80 MG/1
80 TABLET ORAL 2 TIMES DAILY
Qty: 180 TABLET | Refills: 0 | Status: SHIPPED
Start: 2023-08-01 | End: 2024-02-22 | Stop reason: HOSPADM

## 2023-08-02 ENCOUNTER — PATIENT MESSAGE (OUTPATIENT)
Dept: PRIMARY CARE | Facility: CLINIC | Age: 63
End: 2023-08-02
Payer: MEDICARE

## 2023-08-02 DIAGNOSIS — E11.69 TYPE 2 DIABETES MELLITUS WITH OTHER SPECIFIED COMPLICATION, UNSPECIFIED WHETHER LONG TERM INSULIN USE (MULTI): Primary | ICD-10-CM

## 2023-08-07 ENCOUNTER — LAB (OUTPATIENT)
Dept: LAB | Facility: LAB | Age: 63
End: 2023-08-07
Payer: MEDICARE

## 2023-08-07 DIAGNOSIS — E11.69 TYPE 2 DIABETES MELLITUS WITH OTHER SPECIFIED COMPLICATION, UNSPECIFIED WHETHER LONG TERM INSULIN USE (MULTI): ICD-10-CM

## 2023-08-07 LAB
ALBUMIN (G/DL) IN SER/PLAS: 4.4 G/DL (ref 3.4–5)
ANION GAP IN SER/PLAS: 14 MMOL/L (ref 10–20)
CALCIUM (MG/DL) IN SER/PLAS: 9.6 MG/DL (ref 8.6–10.3)
CARBON DIOXIDE, TOTAL (MMOL/L) IN SER/PLAS: 32 MMOL/L (ref 21–32)
CHLORIDE (MMOL/L) IN SER/PLAS: 99 MMOL/L (ref 98–107)
CREATININE (MG/DL) IN SER/PLAS: 1.36 MG/DL (ref 0.5–1.3)
GFR MALE: 59 ML/MIN/1.73M2
GLUCOSE (MG/DL) IN SER/PLAS: 127 MG/DL (ref 74–99)
PHOSPHATE (MG/DL) IN SER/PLAS: 3 MG/DL (ref 2.5–4.9)
POTASSIUM (MMOL/L) IN SER/PLAS: 4.3 MMOL/L (ref 3.5–5.3)
SODIUM (MMOL/L) IN SER/PLAS: 141 MMOL/L (ref 136–145)
UREA NITROGEN (MG/DL) IN SER/PLAS: 21 MG/DL (ref 6–23)

## 2023-08-07 PROCEDURE — 36415 COLL VENOUS BLD VENIPUNCTURE: CPT

## 2023-08-07 PROCEDURE — 83036 HEMOGLOBIN GLYCOSYLATED A1C: CPT

## 2023-08-08 ENCOUNTER — PATIENT MESSAGE (OUTPATIENT)
Dept: PRIMARY CARE | Facility: CLINIC | Age: 63
End: 2023-08-08
Payer: MEDICARE

## 2023-08-08 DIAGNOSIS — E13.69 OTHER SPECIFIED DIABETES MELLITUS WITH OTHER SPECIFIED COMPLICATION, UNSPECIFIED WHETHER LONG TERM INSULIN USE (MULTI): Primary | ICD-10-CM

## 2023-08-08 LAB
ESTIMATED AVERAGE GLUCOSE FOR HBA1C: 154 MG/DL
HEMOGLOBIN A1C/HEMOGLOBIN TOTAL IN BLOOD: 7 %

## 2023-09-06 LAB
ANION GAP IN SER/PLAS: 10 MMOL/L (ref 10–20)
CALCIUM (MG/DL) IN SER/PLAS: 9.6 MG/DL (ref 8.6–10.3)
CARBON DIOXIDE, TOTAL (MMOL/L) IN SER/PLAS: 33 MMOL/L (ref 21–32)
CHLORIDE (MMOL/L) IN SER/PLAS: 100 MMOL/L (ref 98–107)
CREATININE (MG/DL) IN SER/PLAS: 1 MG/DL (ref 0.5–1.3)
GFR MALE: 85 ML/MIN/1.73M2
GLUCOSE (MG/DL) IN SER/PLAS: 148 MG/DL (ref 74–99)
INR IN PPP BY COAGULATION ASSAY: 2.9 (ref 0.9–1.1)
NATRIURETIC PEPTIDE B (PG/ML) IN SER/PLAS: 18 PG/ML (ref 0–99)
POTASSIUM (MMOL/L) IN SER/PLAS: 4.2 MMOL/L (ref 3.5–5.3)
PROTHROMBIN TIME (PT) IN PPP BY COAGULATION ASSAY: 32.8 SEC (ref 9.8–12.8)
SODIUM (MMOL/L) IN SER/PLAS: 139 MMOL/L (ref 136–145)
UREA NITROGEN (MG/DL) IN SER/PLAS: 25 MG/DL (ref 6–23)

## 2023-10-11 ENCOUNTER — LAB (OUTPATIENT)
Dept: LAB | Facility: LAB | Age: 63
End: 2023-10-11
Payer: MEDICARE

## 2023-10-11 DIAGNOSIS — Z79.01 LONG TERM (CURRENT) USE OF ANTICOAGULANTS: Primary | ICD-10-CM

## 2023-10-11 LAB
INR PPP: 2 (ref 0.9–1.1)
PROTHROMBIN TIME: 22.3 SECONDS (ref 9.8–12.8)

## 2023-10-11 PROCEDURE — 85610 PROTHROMBIN TIME: CPT

## 2023-10-11 PROCEDURE — 36415 COLL VENOUS BLD VENIPUNCTURE: CPT

## 2023-10-17 ENCOUNTER — APPOINTMENT (OUTPATIENT)
Dept: PRIMARY CARE | Facility: CLINIC | Age: 63
End: 2023-10-17
Payer: MEDICARE

## 2023-10-25 ENCOUNTER — OFFICE VISIT (OUTPATIENT)
Dept: NEUROLOGY | Facility: CLINIC | Age: 63
End: 2023-10-25
Payer: MEDICARE

## 2023-10-25 DIAGNOSIS — G62.89 OTHER POLYNEUROPATHY: Primary | ICD-10-CM

## 2023-10-25 DIAGNOSIS — R42 DIZZINESS: ICD-10-CM

## 2023-10-25 PROCEDURE — 4010F ACE/ARB THERAPY RXD/TAKEN: CPT | Performed by: PSYCHIATRY & NEUROLOGY

## 2023-10-25 PROCEDURE — 1036F TOBACCO NON-USER: CPT | Performed by: PSYCHIATRY & NEUROLOGY

## 2023-10-25 PROCEDURE — 99214 OFFICE O/P EST MOD 30 MIN: CPT | Performed by: PSYCHIATRY & NEUROLOGY

## 2023-10-25 PROCEDURE — 3051F HG A1C>EQUAL 7.0%<8.0%: CPT | Performed by: PSYCHIATRY & NEUROLOGY

## 2023-10-25 RX ORDER — GABAPENTIN 800 MG/1
800 TABLET ORAL 3 TIMES DAILY
Qty: 90 TABLET | Refills: 11 | Status: SHIPPED | OUTPATIENT
Start: 2023-10-25 | End: 2024-06-02 | Stop reason: HOSPADM

## 2023-10-25 RX ORDER — TOPIRAMATE 50 MG/1
50 TABLET, FILM COATED ORAL 2 TIMES DAILY
Qty: 60 TABLET | Refills: 11 | Status: SHIPPED | OUTPATIENT
Start: 2023-10-25 | End: 2024-10-24

## 2023-10-25 ASSESSMENT — COLUMBIA-SUICIDE SEVERITY RATING SCALE - C-SSRS
1. IN THE PAST MONTH, HAVE YOU WISHED YOU WERE DEAD OR WISHED YOU COULD GO TO SLEEP AND NOT WAKE UP?: NO
2. HAVE YOU ACTUALLY HAD ANY THOUGHTS OF KILLING YOURSELF?: NO
6. HAVE YOU EVER DONE ANYTHING, STARTED TO DO ANYTHING, OR PREPARED TO DO ANYTHING TO END YOUR LIFE?: NO

## 2023-10-25 ASSESSMENT — ENCOUNTER SYMPTOMS
LOSS OF SENSATION IN FEET: 0
DEPRESSION: 0
OCCASIONAL FEELINGS OF UNSTEADINESS: 0

## 2023-10-25 ASSESSMENT — PATIENT HEALTH QUESTIONNAIRE - PHQ9: 1. LITTLE INTEREST OR PLEASURE IN DOING THINGS: NOT AT ALL

## 2023-10-25 NOTE — PROGRESS NOTES
I had a pleasure of seeing Mr. Naisr Sandoval in neurological follow up visit today for c/o intermittent dizzy spells. Mr. Sandoval is a 64 y/o male who reports postural dizziness with making sudden changes of posture such as getting up quickly, bending forward, standing or sitting for long time, making sudden turns, etc. Sometimes palpitations may be a present but no chest pain, major shortness of breath or chest tightness. Certain activities can exacerbate symptoms which usually improve with rest. Symptoms can be waxing and waning in intensity and frequency but are present almost daily. Symptoms are usually mild to moderate, sometimes more severe. No speech or language difficulties. No swallowing problems. No permanent focal weakness or numbness in extremities. No changes in bowels or bladder habits. No major gait or balance issues. No visual symptoms. Patient had ANS/tilt scheduled but did not show. No new c/o today.    Review of Systems/ROS. Unless otherwise stated in this report the patient's positive and negative responses for review of systems (ROS) for constitutional, eyes, ENT, cardiovascular, respiratory, gastrointestinal/GI, neurological, psychiatric, genitourinary/, musculoskeletal, and integument systems and related systems to the presenting problem are either as stated in the history of present illness (HPI), or were not pertinent, or were negative for the symptoms and/or complaints related to the presenting medical problem.     IMAGING, CLINICAL NOTES AND TESTS/LABS: The patient labs, clinical notes, radiology reports, and other tests were obtained, personally reviewed by me, and summarized as applicable from the physician portal, electronic medical records systems and/or outside sources/medical data. Pertinent positive and negative findings were considered in medical decision making. Discussed with the patient and/or family members, when appropriate.    Exam:    Both, physical and neurological  "examinations remain stable and unchanged from the last visit. Informal mental status testing is normal. Fully alert and cooperative. Mood and affect are normal and appropriate. Normal judgment and insight. Well groomed, appearance consistent with stated age. Fund of knowledge WNL. Face symmetric with stable sensation. Not in acute distress. AOx4. Vital signs are stable. Cranial nerves are grossly unchanged. HEENT stable and unchanged; atraumatic, normocephalic. Face symmetric. Extraocular muscle movements are preserved. Vision unchanged and stable. No nystagmus. No double vision. Pupils are equal. Speech and language are normal. No dysarthria. No aphasia. No swallowing difficulties. GI/ stable. Hearing is preserved and unchanged. No significant hearing or visual problems. No SOB or cough. Heart rate regular. Motor and sensory examination is stable and unchanged. No skin changes. No cyanosis. Peripheral pulses are preserved. Reflexes are stable. There is no evidence of any pathological reflexes. Coordination normal. Gait is stable and unchanged from last visit. No antalgic features present during ambulation today.    Impression:    Concern for POTS. Please schedule ANS/tilt table testing. Patient will continue with \"simple measures\" to counteract dizzy spells, including proper hydration, use of electrolyte fluids, proper diet with increased salt intake (if not otherwise medically contraindicated ), certain exercises including water jogging, etc. Orthostatic diary may be kept to evaluate for treatment efficacy. If necessary, the patient can also start wearing knee-high and medium pressure compression stockings in anticipation of prolonged weight bearing activities outside of the house.    Neuropathy - continue gabapentin 800 mg TID. Increase topiramate to 50 mg BID.    Patient will follow up with PCP for other medical problems. If desired, we would like to see the patient in the follow-up in the next 6-8 weeks, or " sooner if there is any worsening or new symptoms dictate. In the meanwhile, the patient was advised to call us with any question or concerns. The patient was also advised to go to ER or call 911 with any sudden worsening of symptoms.    This office note was created with speech recognition Dragon  software. While the final product was checked for mistakes (proofread), it is possible that typographical, grammatical and spelling mistakes remain. If there is any confusion, please do not hesitate to reach out to the office for clarification.    If you have labs or other testing completed and have not been informed of results within one week, the tests were most likely normal and/or without significant changes and can be discussed during follow-up visit. Please call our office with any concerns. If you haven't done so, consider signing up for [facility], the [facility] personal health record were you can access your own results. Ask the staff how you can get started.

## 2023-10-26 ENCOUNTER — TELEMEDICINE CLINICAL SUPPORT (OUTPATIENT)
Dept: NUTRITION | Facility: HOSPITAL | Age: 63
End: 2023-10-26
Payer: MEDICARE

## 2023-10-26 DIAGNOSIS — E11.9 TYPE 2 DIABETES MELLITUS WITHOUT COMPLICATION, UNSPECIFIED WHETHER LONG TERM INSULIN USE (MULTI): ICD-10-CM

## 2023-10-26 DIAGNOSIS — E11.40 TYPE 2 DIABETES MELLITUS WITH DIABETIC NEUROPATHY, UNSPECIFIED WHETHER LONG TERM INSULIN USE (MULTI): Primary | ICD-10-CM

## 2023-10-26 PROCEDURE — 97803 MED NUTRITION INDIV SUBSEQ: CPT | Mod: 95 | Performed by: STUDENT IN AN ORGANIZED HEALTH CARE EDUCATION/TRAINING PROGRAM

## 2023-10-26 NOTE — PROGRESS NOTES
Reason for Nutrition Visit:  Pt is a 63 y.o. male referred for   · Diabetes mellitus (250.00) (E11.9)   Pt initial assessment completed in [facility]    Past Medical Hx:  Patient Active Problem List   Diagnosis    (HFpEF) heart failure with preserved ejection fraction (CMS/HCC)    A-fib (CMS/HCC)    Combined hyperlipidemia    Diabetes mellitus (CMS/HCC)    HTN (hypertension)    Nausea and vomiting    Pseudophakia of left eye    RD (retinal detachment), left    Traumatic enucleation of right eye    Achalasia, esophageal    Cellulitis    Constipation, chronic    Dizziness    Foot pain    Hypotension    Pain in joint, lower leg    Peripheral neuropathy    Chest pain    Choroidal hemorrhage    Drug abuse (CMS/HCC)    VALERIANO (acute kidney injury) (CMS/HCC)    Nephrolithiasis    MARKIE on CPAP    Ruptured globe of right eye    Ureteral stone with hydronephrosis    Atrial fibrillation (CMS/HCC)    Hyperglycemia    Vitreous hemorrhage of right eye (CMS/HCC)        Weight change:    Significant Weight Change: No    Lab Results   Component Value Date    HGBA1C 7.0 (A) 08/07/2023    CHOL 83 01/17/2023    LDLF 14 01/17/2023    TRIG 188 (H) 01/17/2023        Food and Nutrition Hx:  Pt here today for diabetes education follow up. Pt states he has not been able to implement at dietary changes at this point. Pt expressed anger about his current health status and the fact that his diabetes has gotten worse, development of painful neuropathy which has also attributes to feelings of anger and sadness regarding his health status. Pt reports blood sugar readings average in the 150's. Pt denies changes in wt - states weight this morning was 357 lbs. Pt did report that he has been trying to increase vegetables intake and making his own soups. Pt states he gave information packet that he received at his first appointment to his wife who plans meals and grocery shops. Pt reports his goals are to eat healthier and start exercising.       Allergies:  None  Intolerance: None  Appetite: Normal  Intake: >75%  GI Symptoms : None  Swallowing Difficulty: No problems with swallowing  Dentition : own    Types of Activities: Mostly Sedentary     Food Preparation: Patient and Partner/Spouse  Cooking Skills/Barriers: None reported  Grocery Shopping: Patient and Partner/Spouse       Nutrition Focused Physical Exam:    Performed/Deferred: Deferred due to be being virtual visit    Estimated Energy Needs:  Weight Loss Needs: 1800 kcal/day    Nutrition Diagnosis:    Diagnosis Statement 1:  Diagnosis Status: Ongoing  Diagnosis : Obese related to  excessive energy intake  as evidenced by  BMI 45, daily consuming restaurant foods.      Nutrition Interventions:  Consistent Carbohydrate Diet, Decreased Carbohydrate Diet, and Increased Protein Diet  Nutrition Counseling: Motivational Interviewing  Coordination of Care: None    Nutrition Goals:  Nutrition Goals : Blood glucose control  Consistent meal/snack pattern  Normalized fasting and postprandial blood glucose  Pt goals not met - blood sugar remains >130 in the morning, does not have consistent meal intake    Nutrition Recommendations:  Via teach back method patient verbalized understanding of the following topics:  1) 1. Eat a bedtime snack that contains a carb and protein to help with morning blood glucose levels  2. Eat 3 servings of carb per meal  3. Plan meals ahead of time  4. Measure foods for accurate portions  5. Read labels for carb content.     Educational Handouts: none at this time.     Readiness to Change : Fair  Level of Understanding : Good  Anticipated Compliant : Fair

## 2023-10-27 ENCOUNTER — APPOINTMENT (OUTPATIENT)
Dept: NUTRITION | Facility: HOSPITAL | Age: 63
End: 2023-10-27
Payer: MEDICARE

## 2023-10-31 ENCOUNTER — ANCILLARY PROCEDURE (OUTPATIENT)
Dept: RADIOLOGY | Facility: CLINIC | Age: 63
End: 2023-10-31
Payer: MEDICARE

## 2023-10-31 ENCOUNTER — LAB (OUTPATIENT)
Dept: LAB | Facility: LAB | Age: 63
End: 2023-10-31
Payer: MEDICARE

## 2023-10-31 ENCOUNTER — OFFICE VISIT (OUTPATIENT)
Dept: PRIMARY CARE | Facility: CLINIC | Age: 63
End: 2023-10-31
Payer: MEDICARE

## 2023-10-31 VITALS
SYSTOLIC BLOOD PRESSURE: 160 MMHG | HEIGHT: 74 IN | HEART RATE: 100 BPM | WEIGHT: 315 LBS | OXYGEN SATURATION: 96 % | BODY MASS INDEX: 40.43 KG/M2 | DIASTOLIC BLOOD PRESSURE: 100 MMHG

## 2023-10-31 DIAGNOSIS — R09.89 CHEST CONGESTION: ICD-10-CM

## 2023-10-31 DIAGNOSIS — Z00.00 ROUTINE GENERAL MEDICAL EXAMINATION AT HEALTH CARE FACILITY: Primary | ICD-10-CM

## 2023-10-31 DIAGNOSIS — Z23 NEED FOR INFLUENZA VACCINATION: ICD-10-CM

## 2023-10-31 DIAGNOSIS — I10 HYPERTENSION, UNSPECIFIED TYPE: ICD-10-CM

## 2023-10-31 DIAGNOSIS — J44.1 COPD EXACERBATION (MULTI): ICD-10-CM

## 2023-10-31 DIAGNOSIS — I50.30 HEART FAILURE WITH PRESERVED EJECTION FRACTION, UNSPECIFIED HF CHRONICITY (MULTI): ICD-10-CM

## 2023-10-31 DIAGNOSIS — Z79.01 LONG TERM (CURRENT) USE OF ANTICOAGULANTS: ICD-10-CM

## 2023-10-31 DIAGNOSIS — I50.32 CHRONIC DIASTOLIC (CONGESTIVE) HEART FAILURE (MULTI): ICD-10-CM

## 2023-10-31 DIAGNOSIS — F60.81: ICD-10-CM

## 2023-10-31 DIAGNOSIS — F33.41 MAJOR DEPRESSIVE DISORDER, RECURRENT, IN PARTIAL REMISSION (CMS-HCC): ICD-10-CM

## 2023-10-31 DIAGNOSIS — G62.89 OTHER POLYNEUROPATHY: ICD-10-CM

## 2023-10-31 PROBLEM — E66.01 MORBID OBESITY (MULTI): Status: ACTIVE | Noted: 2023-07-12

## 2023-10-31 PROBLEM — H54.40 BLINDNESS, ONE EYE, UNSPECIFIED EYE: Status: RESOLVED | Noted: 2023-10-31 | Resolved: 2023-10-31

## 2023-10-31 PROBLEM — F40.01 PANIC DISORDER WITH AGORAPHOBIA: Status: ACTIVE | Noted: 2023-10-31

## 2023-10-31 PROBLEM — L21.8 OTHER SEBORRHEIC DERMATITIS: Status: RESOLVED | Noted: 2023-10-31 | Resolved: 2023-10-31

## 2023-10-31 PROBLEM — N20.9 URIC ACID UROLITHIASIS: Status: RESOLVED | Noted: 2023-10-31 | Resolved: 2023-10-31

## 2023-10-31 PROBLEM — D07.5 CARCINOMA IN SITU OF PROSTATE: Status: RESOLVED | Noted: 2023-10-31 | Resolved: 2023-10-31

## 2023-10-31 PROBLEM — L60.0 INGROWING TOENAIL: Status: RESOLVED | Noted: 2023-10-31 | Resolved: 2023-10-31

## 2023-10-31 PROBLEM — R13.19 ESOPHAGEAL DYSPHAGIA: Status: RESOLVED | Noted: 2023-10-31 | Resolved: 2023-10-31

## 2023-10-31 PROBLEM — K64.8 INTERNAL HEMORRHOIDS: Status: ACTIVE | Noted: 2023-10-31

## 2023-10-31 PROBLEM — G62.9 PERIPHERAL NEUROPATHY: Status: ACTIVE | Noted: 2023-10-31

## 2023-10-31 PROBLEM — F60.3: Status: ACTIVE | Noted: 2023-10-31

## 2023-10-31 PROBLEM — I50.9 HEART FAILURE, UNSPECIFIED (MULTI): Status: RESOLVED | Noted: 2023-10-31 | Resolved: 2023-10-31

## 2023-10-31 PROBLEM — F33.9 RECURRENT MAJOR DEPRESSION (CMS-HCC): Status: RESOLVED | Noted: 2023-10-31 | Resolved: 2023-10-31

## 2023-10-31 PROBLEM — F10.10 CHRONIC ALCOHOL ABUSE: Status: ACTIVE | Noted: 2023-10-31

## 2023-10-31 PROBLEM — I95.9 HYPOTENSION: Status: RESOLVED | Noted: 2023-03-03 | Resolved: 2023-10-31

## 2023-10-31 PROBLEM — B35.1 ONYCHOMYCOSIS OF TOENAIL: Status: RESOLVED | Noted: 2023-10-31 | Resolved: 2023-10-31

## 2023-10-31 PROBLEM — I50.9 HEART FAILURE (MULTI): Status: RESOLVED | Noted: 2023-10-31 | Resolved: 2023-10-31

## 2023-10-31 PROBLEM — C61 CARCINOMA OF PROSTATE (MULTI): Status: RESOLVED | Noted: 2023-10-31 | Resolved: 2023-10-31

## 2023-10-31 PROBLEM — R52 PAIN, UNSPECIFIED: Status: RESOLVED | Noted: 2023-10-31 | Resolved: 2023-10-31

## 2023-10-31 PROBLEM — K21.9 GASTROESOPHAGEAL REFLUX DISEASE: Status: ACTIVE | Noted: 2023-10-31

## 2023-10-31 PROBLEM — L72.0 EPIDERMAL CYST: Status: RESOLVED | Noted: 2023-10-31 | Resolved: 2023-10-31

## 2023-10-31 PROBLEM — E11.9 TYPE 2 DIABETES MELLITUS WITHOUT COMPLICATIONS (MULTI): Status: ACTIVE | Noted: 2023-10-31

## 2023-10-31 LAB
ALBUMIN SERPL BCP-MCNC: 4.6 G/DL (ref 3.4–5)
ALP SERPL-CCNC: 77 U/L (ref 33–136)
ALT SERPL W P-5'-P-CCNC: 32 U/L (ref 10–52)
ANION GAP SERPL CALC-SCNC: 17 MMOL/L (ref 10–20)
AST SERPL W P-5'-P-CCNC: 24 U/L (ref 9–39)
BILIRUB SERPL-MCNC: 0.3 MG/DL (ref 0–1.2)
BNP SERPL-MCNC: 11 PG/ML (ref 0–99)
BUN SERPL-MCNC: 31 MG/DL (ref 6–23)
CALCIUM SERPL-MCNC: 10.1 MG/DL (ref 8.6–10.6)
CHLORIDE SERPL-SCNC: 101 MMOL/L (ref 98–107)
CO2 SERPL-SCNC: 27 MMOL/L (ref 21–32)
CREAT SERPL-MCNC: 1.21 MG/DL (ref 0.5–1.3)
ERYTHROCYTE [DISTWIDTH] IN BLOOD BY AUTOMATED COUNT: 14.9 % (ref 11.5–14.5)
GFR SERPL CREATININE-BSD FRML MDRD: 68 ML/MIN/1.73M*2
GLUCOSE SERPL-MCNC: 180 MG/DL (ref 74–99)
HCT VFR BLD AUTO: 46.1 % (ref 41–52)
HGB BLD-MCNC: 14.7 G/DL (ref 13.5–17.5)
INR PPP: 1.9 (ref 0.9–1.1)
MCH RBC QN AUTO: 30.9 PG (ref 26–34)
MCHC RBC AUTO-ENTMCNC: 31.9 G/DL (ref 32–36)
MCV RBC AUTO: 97 FL (ref 80–100)
NRBC BLD-RTO: 0 /100 WBCS (ref 0–0)
PLATELET # BLD AUTO: 184 X10*3/UL (ref 150–450)
PMV BLD AUTO: 10.2 FL (ref 7.5–11.5)
POTASSIUM SERPL-SCNC: 4.4 MMOL/L (ref 3.5–5.3)
PROT SERPL-MCNC: 8.4 G/DL (ref 6.4–8.2)
PROTHROMBIN TIME: 21.9 SECONDS (ref 9.8–12.8)
RBC # BLD AUTO: 4.75 X10*6/UL (ref 4.5–5.9)
SODIUM SERPL-SCNC: 141 MMOL/L (ref 136–145)
WBC # BLD AUTO: 8.7 X10*3/UL (ref 4.4–11.3)

## 2023-10-31 PROCEDURE — 80053 COMPREHEN METABOLIC PANEL: CPT

## 2023-10-31 PROCEDURE — 83880 ASSAY OF NATRIURETIC PEPTIDE: CPT

## 2023-10-31 PROCEDURE — G0439 PPPS, SUBSEQ VISIT: HCPCS | Performed by: STUDENT IN AN ORGANIZED HEALTH CARE EDUCATION/TRAINING PROGRAM

## 2023-10-31 PROCEDURE — 85610 PROTHROMBIN TIME: CPT

## 2023-10-31 PROCEDURE — 36415 COLL VENOUS BLD VENIPUNCTURE: CPT

## 2023-10-31 PROCEDURE — 90686 IIV4 VACC NO PRSV 0.5 ML IM: CPT | Performed by: STUDENT IN AN ORGANIZED HEALTH CARE EDUCATION/TRAINING PROGRAM

## 2023-10-31 PROCEDURE — 99214 OFFICE O/P EST MOD 30 MIN: CPT | Performed by: STUDENT IN AN ORGANIZED HEALTH CARE EDUCATION/TRAINING PROGRAM

## 2023-10-31 PROCEDURE — 1036F TOBACCO NON-USER: CPT | Performed by: STUDENT IN AN ORGANIZED HEALTH CARE EDUCATION/TRAINING PROGRAM

## 2023-10-31 PROCEDURE — 71046 X-RAY EXAM CHEST 2 VIEWS: CPT | Performed by: RADIOLOGY

## 2023-10-31 PROCEDURE — 3080F DIAST BP >= 90 MM HG: CPT | Performed by: STUDENT IN AN ORGANIZED HEALTH CARE EDUCATION/TRAINING PROGRAM

## 2023-10-31 PROCEDURE — G0008 ADMIN INFLUENZA VIRUS VAC: HCPCS | Performed by: STUDENT IN AN ORGANIZED HEALTH CARE EDUCATION/TRAINING PROGRAM

## 2023-10-31 PROCEDURE — 85027 COMPLETE CBC AUTOMATED: CPT

## 2023-10-31 PROCEDURE — 3051F HG A1C>EQUAL 7.0%<8.0%: CPT | Performed by: STUDENT IN AN ORGANIZED HEALTH CARE EDUCATION/TRAINING PROGRAM

## 2023-10-31 PROCEDURE — 3077F SYST BP >= 140 MM HG: CPT | Performed by: STUDENT IN AN ORGANIZED HEALTH CARE EDUCATION/TRAINING PROGRAM

## 2023-10-31 PROCEDURE — 71046 X-RAY EXAM CHEST 2 VIEWS: CPT | Mod: FY

## 2023-10-31 PROCEDURE — G0444 DEPRESSION SCREEN ANNUAL: HCPCS | Performed by: STUDENT IN AN ORGANIZED HEALTH CARE EDUCATION/TRAINING PROGRAM

## 2023-10-31 RX ORDER — AMOXICILLIN AND CLAVULANATE POTASSIUM 875; 125 MG/1; MG/1
875 TABLET, FILM COATED ORAL 2 TIMES DAILY
Qty: 14 TABLET | Refills: 0 | Status: SHIPPED | OUTPATIENT
Start: 2023-10-31 | End: 2023-11-07

## 2023-10-31 RX ORDER — AZITHROMYCIN 250 MG/1
TABLET, FILM COATED ORAL
Qty: 6 TABLET | Refills: 0 | Status: SHIPPED | OUTPATIENT
Start: 2023-10-31 | End: 2023-11-05

## 2023-10-31 RX ORDER — PREDNISONE 20 MG/1
20 TABLET ORAL DAILY
Qty: 5 TABLET | Refills: 0 | Status: SHIPPED | OUTPATIENT
Start: 2023-10-31 | End: 2023-11-05

## 2023-10-31 ASSESSMENT — ENCOUNTER SYMPTOMS
NEUROLOGICAL NEGATIVE: 1
PSYCHIATRIC NEGATIVE: 1
LOSS OF SENSATION IN FEET: 0
OCCASIONAL FEELINGS OF UNSTEADINESS: 1
DEPRESSION: 0
GASTROINTESTINAL NEGATIVE: 1
MUSCULOSKELETAL NEGATIVE: 1
SHORTNESS OF BREATH: 1
CARDIOVASCULAR NEGATIVE: 1
CONSTITUTIONAL NEGATIVE: 1
WHEEZING: 1
COUGH: 1

## 2023-10-31 ASSESSMENT — ACTIVITIES OF DAILY LIVING (ADL)
DRESSING: INDEPENDENT
BATHING: INDEPENDENT
DOING_HOUSEWORK: INDEPENDENT
GROCERY_SHOPPING: INDEPENDENT
TAKING_MEDICATION: INDEPENDENT
MANAGING_FINANCES: INDEPENDENT

## 2023-10-31 ASSESSMENT — PATIENT HEALTH QUESTIONNAIRE - PHQ9
1. LITTLE INTEREST OR PLEASURE IN DOING THINGS: NOT AT ALL
2. FEELING DOWN, DEPRESSED OR HOPELESS: NOT AT ALL
SUM OF ALL RESPONSES TO PHQ9 QUESTIONS 1 AND 2: 0

## 2023-10-31 NOTE — PROGRESS NOTES
Chief Complaint: Medicare Wellness Exam/Comprehensive Problem Focused Follow Up and Physical Exam    HPI:      Active Problem List      Comprehensive Medical/Surgical/Social/Family History  Past Medical History:   Diagnosis Date    VALERIANO (acute kidney injury) (CMS/HCC) 05/15/2012    Avulsion of left eye, initial encounter 11/30/2018    Traumatic enucleation of left eye    Avulsion of left eye, initial encounter 11/30/2018    Traumatic enucleation of left eye    Carcinoma in situ of prostate 10/31/2023    Epidermal cyst 10/31/2023    Esophageal dysphagia 10/31/2023    Heart failure (CMS/HCC) 10/31/2023    Heart failure, unspecified (CMS/HCC) 10/31/2023    Hypotension 03/03/2023    Ingrowing toenail 10/31/2023    Nephrolithiasis 05/15/2012    Pain, unspecified 10/31/2023    Recurrent major depression (CMS/HCC) 10/31/2023    Uric acid urolithiasis 10/31/2023    Vitreous degeneration, left eye 11/30/2018    PVD (posterior vitreous detachment), left eye    Vitreous degeneration, left eye 11/30/2018    PVD (posterior vitreous detachment), left eye     No past surgical history on file.  Social History     Social History Narrative    Not on file         Allergies and Medications  Patient has no known allergies.  Current Outpatient Medications on File Prior to Visit   Medication Sig Dispense Refill    acetaminophen (Tylenol) 325 mg tablet 2 tablets (650 mg).      albuterol 90 mcg/actuation inhaler Inhale every 6 hours if needed.      ammonium lactate (Amlactin) 12 % cream APPLY A SMALL AMOUNT EXTERNALLY TWICE A DAY TO AFFECTED AREAS OF DRY SKIN AS NEEDED      bacitracin 500 unit/gram ointment APPLY A SUFFICIENT AMOUNT EXTERNALLY TWICE A DAY AS NEEDED AS DIRECTED      betamethasone dipropionate (Diprosone) 0.05 % lotion Apply topically 2 times a day as needed for irritation or rash. 60 mL 2    bisacodyl (Dulcolax) 10 mg suppository Insert 1 suppository (10 mg) into the rectum once daily as needed for constipation.       carboxymethylcellulose (Refresh Plus) 0.5 % ophthalmic solution INSTILL 1 DROP IN LEFT EYE FOUR TIMES A DAY FOR DRY AND/OR IRRITATED EYE(S)      cetirizine (ZyrTEC) 5 mg tablet Take 1 tablet (5 mg) by mouth once daily.      cholecalciferol (Vitamin D-3) 25 MCG (1000 UT) tablet Take 2 tablets (50 mcg) by mouth once daily.      clindamycin (Cleocin T) 1 % external solution APPLY A SUFFICIENT AMOUNT EXTERNALLY TWICE A DAY TO SCROTUM AS NEEDED FOR LESION      colchicine 0.6 mg tablet Take 1 tablet (0.6 mg) by mouth twice a day.      diclofenac sodium 1 % kit APPLY 4GM AS MEASURED ON DOSING CARD EXTERNALLY TWICE A DAY (GENTLY MASSAGE INTO SKIN) *FLAMMABLE: KEEP AWAY FROM HEAT AND FLAMES*      dicyclomine (Bentyl) 20 mg tablet Take 1 tablet (20 mg) by mouth.      digoxin (Lanoxin) 125 MCG tablet Take 1 tablet (125 mcg) by mouth once daily.      digoxin (Lanoxin) 250 MCG tab;et Take 1 tablet (250 mcg) by mouth once daily.      docosahexaenoic acid/epa (FISH OIL ORAL) Fish Oil OIL   Refills: 0       Active      docusate sodium (Colace) 100 mg capsule Take 1 capsule (100 mg) by mouth in the morning and 1 capsule (100 mg) before bedtime.      empagliflozin (Jardiance) 25 mg       erythromycin (Romycin) 5 mg/gram (0.5 %) ophthalmic ointment APPLY A HALF INCH RIBBON TO LEFT EYE IN THE MORNING      flash glucose sensor (FREESTYLE BÁRBARA 2 SENSOR [facility] [place]) Apply 1 sensor every 14 days to the back of upper arm      fluticasone (Flonase) 50 mcg/actuation nasal spray Administer 2 sprays into each nostril once daily. Shake gently. Before first use, prime pump. After use, clean tip and replace cap. 16 g 2    furosemide (Lasix) 80 mg tablet Take 1 tablet (80 mg) by mouth 2 times a day. 180 tablet 0    gabapentin (Neurontin) 800 mg tablet Take 1 tablet (800 mg) by mouth 3 times a day. 90 tablet 11    glipiZIDE (Glucotrol) 10 mg tablet Take 1 tablet (10 mg) by mouth.      hydrALAZINE (Apresoline) 10 mg tablet Take 1 tablet (10 mg) by mouth 3  times a day.      hydrALAZINE (Apresoline) 25 mg tablet Take 3 tablets (75 mg) by mouth 3 times a day.      hydroCHLOROthiazide (HYDRODiuril) 25 mg tablet Take 1 tablet (25 mg) by mouth once daily.      ketoconazole (NIZOral) 2 % shampoo SHAMPOO A SUFFICIENT AMOUNT EXTERNALLY EVERY OTHER DAY .  LATHER ONTO THE SCALP, FACE, BEHIND EARS FOR 5 MINUTES EVERY OTHER DAY  THEN RINSE .  LATHER ONTO THE SCALP, FACE, BEHIND EARS FOR 5 MINUTES EVERY OTHER DAY   THEN RINSE      KRILL OIL ORAL Take by mouth every 12 hours.      levoFLOXacin (Levaquin) 750 mg tablet       loratadine (Claritin) 10 mg tablet Take 1 tablet (10 mg) by mouth once daily. 90 tablet 1    losartan (Cozaar) 100 mg tablet Take 1 tablet (100 mg) by mouth once daily.      magnesium oxide (Mag-Ox) 400 mg (241.3 mg magnesium) tablet Take 1 tablet (400 mg) by mouth twice a day.      magnesium oxide (Mag-Ox) 420 mg tablet 1 tab(s) orally 2 times a day      metFORMIN (Glucophage) 500 mg tablet Take 2 tablets (1,000 mg) by mouth in the morning and 2 tablets (1,000 mg) in the evening. Take with meals.      metoprolol succinate XL (Toprol-XL) 100 mg 24 hr tablet Take 1 tablet (100 mg) by mouth twice a day.      metoprolol succinate XL (Toprol-XL) 25 mg 24 hr tablet Take 1 tablet (25 mg) by mouth once daily.      metoprolol tartrate (Lopressor) 100 mg tablet Take by mouth every 12 hours.      multivitamin tablet Take by mouth.      mupirocin (Bactroban) 2 % ointment APPLY A SUFFICIENT AMOUNT EXTERNALLY THREE TIMES A DAY AS NEEDED TO BOILS      omega 3-dha-epa-fish oil (Fish OiL) 1,000 mg (120 mg-180 mg) capsule Take by mouth.      omeprazole (PriLOSEC) 20 mg DR capsule Take 1 capsule (20 mg) by mouth once daily.      ondansetron ODT (Zofran-ODT) 4 mg disintegrating tablet Take by mouth.      phenyleph-min oil-petrolatum (Preparation H) 0.25-14-74.9 % rectal ointment Insert 1 Application into the rectum 3 times a day.      polyethylene glycol (Glycolax) 17 gram packet  Mix 1 packet in 8 ounces of liquid and drink once daily      pot,sodium citrate-citric acid (Tricitrates) 550-500-334 mg/5 mL solution Take 15 mL by mouth.      potassium chloride CR (Klor-Con M20) 20 mEq ER tablet Take 2 tablets (40 mEq) by mouth once daily.      potassium chloride CR 10 mEq ER tablet Take 1 tablet (10 mEq) by mouth once daily.      prazosin (Minipress) 2 mg capsule Take 1 capsule (2 mg) by mouth once daily at bedtime. 90 capsule 0    psyllium (Metamucil) 3.4 gram packet Take by mouth.      psyllium husk (METAMUCIL ORAL) 48.57% oral powder. Use as directed. Takes once daily, one scoop      rosuvastatin (Crestor) 20 mg tablet Take 1 tablet (20 mg) by mouth once daily.      sertraline (Zoloft) 100 mg tablet Take 1 tablet (100 mg) by mouth once daily.      sertraline 200 mg capsule       simvastatin (Zocor) 10 mg tablet Take 1 tablet (10 mg) by mouth.      simvastatin (Zocor) 80 mg tablet Take 1 tablet (80 mg) by mouth.      spironolactone (Aldactone) 50 mg tablet Take 1 tablet (50 mg) by mouth once daily.      tamsulosin (Flomax) 0.4 mg 24 hr capsule Take 1 capsule (0.4 mg) by mouth once daily.      topiramate (Topamax) 50 mg tablet Take 1 tablet (50 mg) by mouth 2 times a day. 60 tablet 11    valsartan (Diovan) 40 mg tablet 0.5 tablets (20 mg).      venlafaxine XR (Effexor-XR) 75 mg 24 hr capsule Take by mouth.      vitamin D3-vitamin K2, MK4, 1,000-100 unit-mcg tablet Take by mouth.      warfarin (Coumadin) 10 mg tablet Take 1 tablet (10 mg) by mouth once daily.      warfarin (Coumadin) 5 mg tablet 1 tab(s) orally once a day      [DISCONTINUED] gabapentin (Neurontin) 300 mg capsule Take 1 capsule (300 mg) by mouth 3 times a day. 270 capsule 0     No current facility-administered medications on file prior to visit.       Medications and Supplements  prescribed by me and other practitioners or clinical pharmacist (such as prescriptions, OTC's, herbal therapies and supplements) were reviewed and  "documented in the medical record.    Tobacco/Alcohol/Opioid use, as well as Illicit Drug Use was screened for/reviewed and documented in Social History section and medication list as appropriate  Activities of Daily Living  In your present state of health, do you have any difficulty performing the following activities?:   Preparing food and eating?: No  Bathing yourself: No  Getting dressed: No  Using the toilet:No  Moving around from place to place: No  In the past year have you fallen or had a near fall?:Yes    Depression Screen  (Note: if answer to either of the following is \"Yes\", then a more complete depression screening is indicated)   Q1: Over the past two weeks, have you felt down, depressed or hopeless? No  Q2: Over the past two weeks, have you felt little interest or pleasure in doing things? No    Current exercise habits: The patient does not participate in regular exercise at present.   Dietary issues discussed: Yes  Hearing difficulties:no  Safe in current home environment: yes  Visual Acuity assessed: no  Cognitive Impairment assessed: no       Advance directives  Advanced Care Planning (including a Living Will, Healthcare POA, as well as specific end of life choices and/or directives), was discussed for approximately 16 minutes with the patient and/or surrogate, voluntarily, and documented in the medical record.     Cardiac Risk Assessment  Cardiovascular risk was discussed and, if needed, lifestyle modifications recommended, including nutritional choices, exercise, and elimination of habits contributing to risk. We agreed on a plan to reduce the current cardiovascular risk based on above discussion as needed.  Aspirin use/disuse was discussed after reviewing the updated guidelines below:    Consider low dose Aspirin ( mg) use if the benefit for cardiovascular disease prevention outweighs risk for bleeding complications.   In general, low dose ASA should be considered:  In patients WITHOUT prior " MI/stroke/PAD (primary prevention):   a. Age <60: Use if 10-year cardiovascular disease risk >20%, with discussion of risks and benefits with patient  b. Age 60-<70: Use if 10-year cardiovascular disease risk >20% and low bleeding (e.g., gastrointenstinal) risk, with discussion of risks and benefits with patient  c. Age >=70: Do not use    In patients WITH prior MI/stroke/PAD (secondary prevention):   Generally use unless extremely high bleeding (e.g., gastrointenstinal) risk, with discussion of risks and benefits with patient    ROS otherwise negative aside from what was mentioned above in HPI.    Vitals  Vitals:    10/31/23 0949   BP: (!) 160/100   Pulse: 100   SpO2: 96%     Body mass index is 45.84 kg/m².  Physical Exam  Gen: Alert, NAD  HEENT:  PERRLA, EOMI, conjunctiva and sclera normal in appearance. External auditory canals/TMs normal; Oral cavity and posterior pharynx without lesions/exudate  Neck:  Supple with FROM; No masses/nodes palpable; Thyroid nontender and without nodules; No AZUL  Respiratory:  Lungs CTAB  Cardiovascular:  Heart RRR. No M/R/G. Peripheral pulses equal bilaterally  Abdomen:  Soft, nontender, BS present throughout; No R/G/R; No HSM or masses palpated  Extremities:  FROM all extremities; Muscle strength grossly normal with good tone  Neuro:  CN II-XII intact; Reflexes 2+/2+; Gross motor and sensory intact  Skin:  No suspicious lesions present  Breast: No masses, skin lesions or nipple discharges, no axillary lymphadenopathy    Assessment and Plan:  Problem List Items Addressed This Visit    None  Visit Diagnoses       Need for influenza vaccination                    During the course of the visit the patient was educated and counseled about age appropriate screening and preventive services. Completed preventive screenings were documented in the chart and orders were placed for outstanding screenings/procedures as documented in the Assessment and Plan.      Patient Instructions (the  written plan) was given to the patient at check out.      Radha Grimaldo MA

## 2023-10-31 NOTE — PROGRESS NOTES
"Subjective   Reason for Visit: Nasir Sandoval is an 63 y.o. male here for a Medicare Wellness visit.     Past Medical, Surgical, and Family History reviewed and updated in chart.    Reviewed all medications by prescribing practitioner or clinical pharmacist (such as prescriptions, OTCs, herbal therapies and supplements) and documented in the medical record.    Patient seen on follow-up as well as Medicare wellness exam.  States that he is overall doing okay however does have a couple of issues and concerns.  States his neuropathy has significantly worsened.  He does see a neurologist, has not done any nerve conduction testing.  States that his hands are constantly burning and that his symptoms are not well controlled at all.  Otherwise, he states that he is gaining some weight as well.  States that some of his medications were changed, and since this time, has been gaining weight.  Does note compliance with his diuretics.  Regards that his PTSD has somewhat worsened as well.  In addition, feels wheezing and short of breath today.  Denies any additional issues        Patient Care Team:  Casey Dick,  as PCP - General (Hospitalist)     Review of Systems   Constitutional: Negative.    HENT: Negative.     Respiratory:  Positive for cough, shortness of breath and wheezing.    Cardiovascular: Negative.    Gastrointestinal: Negative.    Musculoskeletal: Negative.    Skin: Negative.    Neurological: Negative.    Psychiatric/Behavioral: Negative.         Objective   Vitals:  BP (!) 160/100   Pulse 100   Ht 1.88 m (6' 2\")   Wt (!) 162 kg (357 lb)   SpO2 96%   BMI 45.84 kg/m²       Physical Exam  Vitals and nursing note reviewed.   Constitutional:       General: He is not in acute distress.     Appearance: Normal appearance.   HENT:      Mouth/Throat:      Mouth: Mucous membranes are moist.      Pharynx: Oropharynx is clear. No oropharyngeal exudate.   Eyes:      Extraocular Movements: Extraocular movements intact. "      Pupils: Pupils are equal, round, and reactive to light.   Cardiovascular:      Rate and Rhythm: Normal rate and regular rhythm.      Pulses: Normal pulses.      Heart sounds: Normal heart sounds. No murmur heard.  Pulmonary:      Effort: Pulmonary effort is normal. No respiratory distress.      Breath sounds: Stridor present. Wheezing and rhonchi present.      Comments: Productive sputum  Abdominal:      General: Abdomen is flat. Bowel sounds are normal. There is no distension.      Tenderness: There is no abdominal tenderness.   Musculoskeletal:         General: Normal range of motion.      Right lower leg: No edema.      Left lower leg: No edema.   Skin:     General: Skin is warm and dry.      Capillary Refill: Capillary refill takes less than 2 seconds.   Neurological:      General: No focal deficit present.      Mental Status: He is alert. Mental status is at baseline.      Cranial Nerves: No cranial nerve deficit.      Motor: No weakness.   Psychiatric:         Mood and Affect: Mood normal.         Thought Content: Thought content normal.         Assessment/Plan   Problem List Items Addressed This Visit       (HFpEF) heart failure with preserved ejection fraction (CMS/HCC)    Relevant Orders    B-type natriuretic peptide    Hypertension    Chronic diastolic (congestive) heart failure (CMS/HCC)    Major depressive disorder, recurrent, in partial remission (CMS/HCC)    Narcissistic personality disorder (CMS/HCC)    Peripheral neuropathy    Relevant Orders    EMG & nerve conduction     Other Visit Diagnoses       Routine general medical examination at health care facility    -  Primary    Need for influenza vaccination        Relevant Orders    Flu vaccine (IIV4) age 6 months and greater, preservative free    Chest congestion        Relevant Orders    XR chest 2 views    B-type natriuretic peptide    COPD exacerbation (CMS/HCC)        Relevant Medications    predniSONE (Deltasone) 20 mg tablet    azithromycin  (Zithromax) 250 mg tablet    amoxicillin-pot clavulanate (Augmentin) 875-125 mg tablet    Other Relevant Orders    Comprehensive Metabolic Panel    CBC                PT seen on follow up     #elevated uric acid  #gout  Stable at this time encourage avoiding risk factors, has not had a gout flare in quite some time, continue on diuretics     #PTSD  Initiation of Minipress, has weaned himself off Valium continue current medication advised follow-up with psych, could uptitrate medication if worsens    #HFpEF  Advise low-salt diet, increase diuretics, follow-up with cardiology continue all other cardiac meds     #Neuropathy  Diabetes significantly contributing has follow-up with neurologist however has not gotten nerve conduction test ordered today he is maxed out on gabapentin.  Consider pain management referral.  Obtain EMGs today    #COPD exacerbation, suspected  Noted diffuse wheezing and shortness of breath on exam, recommend chest x-ray today, steroid burst, azithromycin and Augmentin     #Diabetes  #Insomnia  #Neuropathy  #Hypertension  #BPH  Management as above, [facility], monitor has weaned him off sedating medication  He does have follow-up with endocrinology tomorrow, management of diabetes per service     #Health maintenance  Labs reviewed  Advise age-appropriate vaccinations  Up-to-date on routine testing     Return to care in 3 to 5 months or as needed

## 2023-11-02 ENCOUNTER — TELEPHONE (OUTPATIENT)
Dept: PRIMARY CARE | Facility: CLINIC | Age: 63
End: 2023-11-02
Payer: MEDICARE

## 2023-11-09 ENCOUNTER — LAB (OUTPATIENT)
Dept: LAB | Facility: LAB | Age: 63
End: 2023-11-09
Payer: MEDICARE

## 2023-11-09 DIAGNOSIS — Z79.01 LONG TERM (CURRENT) USE OF ANTICOAGULANTS: ICD-10-CM

## 2023-11-09 LAB
INR PPP: 1.4 (ref 0.9–1.1)
PROTHROMBIN TIME: 16.3 SECONDS (ref 9.8–12.8)

## 2023-11-09 PROCEDURE — 85610 PROTHROMBIN TIME: CPT

## 2023-11-09 PROCEDURE — 36415 COLL VENOUS BLD VENIPUNCTURE: CPT

## 2023-11-15 ENCOUNTER — OFFICE VISIT (OUTPATIENT)
Dept: CARDIOLOGY | Facility: CLINIC | Age: 63
End: 2023-11-15
Payer: MEDICARE

## 2023-11-15 VITALS
DIASTOLIC BLOOD PRESSURE: 88 MMHG | BODY MASS INDEX: 41.98 KG/M2 | OXYGEN SATURATION: 95 % | HEART RATE: 84 BPM | WEIGHT: 315 LBS | SYSTOLIC BLOOD PRESSURE: 150 MMHG

## 2023-11-15 DIAGNOSIS — S05.71XD: ICD-10-CM

## 2023-11-15 DIAGNOSIS — H33.22 LEFT RETINAL DETACHMENT: ICD-10-CM

## 2023-11-15 DIAGNOSIS — E66.01 MORBID OBESITY (MULTI): ICD-10-CM

## 2023-11-15 DIAGNOSIS — G47.33 OBSTRUCTIVE SLEEP APNEA OF ADULT: ICD-10-CM

## 2023-11-15 DIAGNOSIS — I48.19 PERSISTENT ATRIAL FIBRILLATION (MULTI): ICD-10-CM

## 2023-11-15 DIAGNOSIS — E11.9 TYPE 2 DIABETES MELLITUS WITHOUT COMPLICATION, WITHOUT LONG-TERM CURRENT USE OF INSULIN (MULTI): ICD-10-CM

## 2023-11-15 DIAGNOSIS — I50.32 CHRONIC HEART FAILURE WITH PRESERVED EJECTION FRACTION (MULTI): Primary | ICD-10-CM

## 2023-11-15 PROCEDURE — 93000 ELECTROCARDIOGRAM COMPLETE: CPT | Performed by: INTERNAL MEDICINE

## 2023-11-15 PROCEDURE — 99214 OFFICE O/P EST MOD 30 MIN: CPT | Performed by: INTERNAL MEDICINE

## 2023-11-15 PROCEDURE — 1036F TOBACCO NON-USER: CPT | Performed by: INTERNAL MEDICINE

## 2023-11-15 PROCEDURE — 3079F DIAST BP 80-89 MM HG: CPT | Performed by: INTERNAL MEDICINE

## 2023-11-15 PROCEDURE — 3077F SYST BP >= 140 MM HG: CPT | Performed by: INTERNAL MEDICINE

## 2023-11-15 PROCEDURE — 3051F HG A1C>EQUAL 7.0%<8.0%: CPT | Performed by: INTERNAL MEDICINE

## 2023-11-15 NOTE — ASSESSMENT & PLAN NOTE
EKG today Afib at 84/min., LAD, old ASWMI   Patient called to set up an appointment for an INR ans was told to call us to be screened for COVID. Cough has been longstanding and is her normal.  Triaged to call to see her physician within 3 days and set up an INR.      COVID 19 Nurse Triage Plan    Please be aware that novel coronavirus (COVID-19) may be circulating in the community. If you develop symptoms such as fever, cough, or SOB or if you have concerns about the presence of another infection including coronavirus (COVID-19), please contact your health care provider or visit [email or web address].     Patient HAS NO known exposure, has a long standing cough was told to call nurse triage line.    Additional General Information About COVID-19    COVID-19 - General Information:  Regardless of if you have been tested or not:  Patient who have symptoms (cough, fever, or shortness of breath), need to isolate for 7 days from when symptoms started AND 72 hours after fever resolves (without fever reducing medications) AND improvement of respiratory symptoms (whichever is longer).      Isolate yourself at home (in own room/own bathroom if possible)    Do Not allow any visitors    Do Not go to work or school    Do Not go to [place],  centers, shopping, or other public places.    Do Not shake hands.    Avoid close and intimate contact with others (hugging, kissing).    Follow CDC recommendations for household cleaning of frequently touched services.     After the initial 7 days, continue to isolate yourself from household members as much as possible. To continue decrease the risk of community spread and exposure, you and any members of your household should limit activities in public for 14 days after starting home isolation.     You can reference the following CDC link for helpful home isolation/care tips:  https://www.cdc.gov/coronavirus/2019-ncov/downloads/10Things.pdf    COVID-19 - Symptoms:     The COVID-19 can cause a respiratory illness, such as bronchitis  or pneumonia.    The most common symptoms are: cough, fever, and shortness of breath.     Other symptoms are: body aches, chills, diarrhea, fatigue, headache, runny nose, and sore throat     COVID-19 - Exposure Risk Factors:    Exposure to a person who has been diagnosed with COVID-19 .    Travel from an area with recent local transmission of COVID-19 .    The CDC (www.cdc.gov) has the most up-to-date list of where the COVID-19 outbreak is occurring.    COVID-19 - Spreading:     The virus likely spreads through respiratory droplets produced when a person coughs or sneezes. These respiratory droplets can travel approximately 6 feet and can remain on surfaces.  Common disinfectants will kill the virus.    The CDC currently does not recommend healthy people wearing masks.    COVID-19 - Protect Yourself:     Avoid close contact with people known to have this new COVID-19 infection.    Wash hands often with soap and water or alcohol-based hand .    Avoid touching the eyes, nose or mouth.       Thank you for limiting contact with others, wearing a simple mask to cover your cough, practice good hand hygiene habits and accessing our virtual services where possible to limit the spread of this virus.    For more information about COVID19 and options for caring for yourself at home, please visit the CDC website at https://www.cdc.gov/coronavirus/2019-ncov/about/steps-when-sick.html  For more options for care at [facility], please visit our website at [email or web address]                    Additional Information    Negative: Bluish (or gray) lips or face    Negative: Severe difficulty breathing (e.g., struggling for each breath, speaks in single words)    Negative: Coughing started suddenly after medicine, an allergic food or bee sting    Negative: Rapid onset of cough and has hives    Negative: Difficulty breathing after exposure to flames, smoke, or fumes    Negative: Sounds like a  "life-threatening emergency to the triager    Negative: Previous asthma attacks and this feels like asthma attack    Negative: Chest pain present when not coughing    Negative: Difficulty breathing    Negative: Passed out (i.e., fainted, collapsed and was not responding)    Negative: Patient sounds very sick or weak to the triager    Negative: Coughed up > 1 tablespoon (15 ml) blood (Exception: blood-tinged sputum)    Negative: Fever > 103 F (39.4 C)    Negative: Fever > 101 F (38.3 C) and over 60 years of age    Negative: Fever > 100.0 F (37.8 C) and has diabetes mellitus or a weak immune system (e.g., HIV positive, cancer chemotherapy, organ transplant, splenectomy, chronic steroids)    Negative: Fever > 100.0 F (37.8 C) and bedridden (e.g., nursing home patient, stroke, chronic illness, recovering from surgery)    Negative: Increasing ankle swelling    Negative: Wheezing is present    Negative: SEVERE coughing spells (e.g., whooping sound after coughing, vomiting after coughing)    Negative: Coughing up trinidad-colored (reddish-brown) or blood-tinged sputum    Negative: Fever present > 3 days (72 hours)    Negative: Fever returns after gone for over 24 hours and symptoms worse or not improved    Negative: Using nasal washes and pain medicine > 24 hours and sinus pain persists    Negative: Known COPD or other severe lung disease (i.e., bronchiectasis, cystic fibrosis, lung surgery) and worsening symptoms (i.e., increased sputum purulence or amount, increased breathing difficulty)    Negative: Continuous (nonstop) coughing interferes with work or school and no improvement using cough treatment per Care Advice    Negative: Patient wants to be seen    Cough has been present for > 3 weeks    Answer Assessment - Initial Assessment Questions  1. ONSET: \"When did the cough begin?\"       Chronic, not new  2. SEVERITY: \"How bad is the cough today?\"       Has no coughing today  3. RESPIRATORY DISTRESS: \"Describe your " "breathing.\"       No  4. FEVER: \"Do you have a fever?\" If so, ask: \"What is your temperature, how was it measured, and when did it start?\"     No  5. HEMOPTYSIS: \"Are you coughing up any blood?\" If so ask: \"How much?\" (flecks, streaks, tablespoons, etc.)      No  6. TREATMENT: \"What have you done so far to treat the cough?\" (e.g., meds, fluids, humidifier)      Maintains hydration  7. CARDIAC HISTORY: \"Do you have any history of heart disease?\" (e.g., heart attack, congestive heart failure)       Valve replacement, Chronic coumadin  8. LUNG HISTORY: \"Do you have any history of lung disease?\"  (e.g., pulmonary embolus, asthma, emphysema)      Asthma on oxygen  9. PE RISK FACTORS: \"Do you have a history of blood clots?\" (or: recent major surgery, recent prolonged travel, bedridden )      Shoulder replacement 1 year ago, 1999- PE  10. OTHER SYMPTOMS: \"Do you have any other symptoms? (e.g., runny nose, wheezing, chest pain)         No          11. PREGNANCY: \"Is there any chance you are pregnant?\" \"When was your last menstrual period?\"        No  12. TRAVEL: \"Have you traveled out of the country in the last month?\" (e.g., travel history, exposures)        No    Protocols used: COUGH-A-OH      "

## 2023-11-15 NOTE — PROGRESS NOTES
Subjective  Nasir Sandoval  is a 63 y.o. year old male who presents for follow up HFpEF.   Had possible pneumonianot cnfirmed on 10/31/23 CXR.  Occ. Coughing up of phlegm in the AM.  Toherwise no dyspnea, no chest pain, no palpiations, no edema.  BP was 116/79 this morning at home    Blood pressure 150/88, pulse 84, weight 148 kg (327 lb), SpO2 95 %.   Penicillins  Past Medical History:   Diagnosis Date    VALERIANO (acute kidney injury) (CMS/Formerly [facility]) 05/15/2012    Avulsion of left eye, initial encounter 11/30/2018    Traumatic enucleation of left eye    Avulsion of left eye, initial encounter 11/30/2018    Traumatic enucleation of left eye    Carcinoma in situ of prostate 10/31/2023    Epidermal cyst 10/31/2023    Esophageal dysphagia 10/31/2023    Heart failure (CMS/Formerly [facility]) 10/31/2023    Heart failure, unspecified (CMS/Formerly [facility]) 10/31/2023    Hypotension 03/03/2023    Ingrowing toenail 10/31/2023    Nephrolithiasis 05/15/2012    Pain, unspecified 10/31/2023    Recurrent major depression (CMS/Formerly [facility]) 10/31/2023    Uric acid urolithiasis 10/31/2023    Vitreous degeneration, left eye 11/30/2018    PVD (posterior vitreous detachment), left eye    Vitreous degeneration, left eye 11/30/2018    PVD (posterior vitreous detachment), left eye     No past surgical history on file.  No family history on file.  @SOC    Current Outpatient Medications   Medication Sig Dispense Refill    albuterol 90 mcg/actuation inhaler Inhale every 6 hours if needed.      ammonium lactate (Amlactin) 12 % cream APPLY A SMALL AMOUNT EXTERNALLY TWICE A DAY TO AFFECTED AREAS OF DRY SKIN AS NEEDED      bacitracin 500 unit/gram ointment APPLY A SUFFICIENT AMOUNT EXTERNALLY TWICE A DAY AS NEEDED AS DIRECTED      betamethasone dipropionate (Diprosone) 0.05 % lotion Apply topically 2 times a day as needed for irritation or rash. 60 mL 2    carboxymethylcellulose (Refresh Plus) 0.5 % ophthalmic solution INSTILL 1 DROP IN LEFT EYE FOUR TIMES A DAY FOR DRY AND/OR IRRITATED  EYE(S)      cetirizine (ZyrTEC) 5 mg tablet Take 1 tablet (5 mg) by mouth once daily.      cholecalciferol (Vitamin D-3) 25 MCG (1000 UT) tablet Take 2 tablets (50 mcg) by mouth once daily.      clindamycin (Cleocin T) 1 % external solution APPLY A SUFFICIENT AMOUNT EXTERNALLY TWICE A DAY TO SCROTUM AS NEEDED FOR LESION      digoxin (Lanoxin) 125 MCG tablet Take 1 tablet (125 mcg) by mouth once daily.      docosahexaenoic acid/epa (FISH OIL ORAL) Fish Oil OIL   Refills: 0       Active      docusate sodium (Colace) 100 mg capsule Take 1 capsule (100 mg) by mouth 2 times a day.      erythromycin (Romycin) 5 mg/gram (0.5 %) ophthalmic ointment APPLY A HALF INCH RIBBON TO LEFT EYE IN THE MORNING      furosemide (Lasix) 80 mg tablet Take 1 tablet (80 mg) by mouth 2 times a day. 180 tablet 0    gabapentin (Neurontin) 800 mg tablet Take 1 tablet (800 mg) by mouth 3 times a day. 90 tablet 11    glipiZIDE (Glucotrol) 10 mg tablet Take 1 tablet (10 mg) by mouth.      ketoconazole (NIZOral) 2 % shampoo SHAMPOO A SUFFICIENT AMOUNT EXTERNALLY EVERY OTHER DAY .  LATHER ONTO THE SCALP, FACE, BEHIND EARS FOR 5 MINUTES EVERY OTHER DAY  THEN RINSE .  LATHER ONTO THE SCALP, FACE, BEHIND EARS FOR 5 MINUTES EVERY OTHER DAY   THEN RINSE      KRILL OIL ORAL Take by mouth every 12 hours.      levoFLOXacin (Levaquin) 750 mg tablet       magnesium oxide (Mag-Ox) 400 mg (241.3 mg magnesium) tablet Take 1 tablet (400 mg) by mouth twice a day.      multivitamin tablet Take by mouth.      omega 3-dha-epa-fish oil (Fish OiL) 1,000 mg (120 mg-180 mg) capsule Take by mouth.      potassium chloride CR (Klor-Con M20) 20 mEq ER tablet Take 2 tablets (40 mEq) by mouth 2 times a day.      prazosin (Minipress) 2 mg capsule Take 1 capsule (2 mg) by mouth once daily at bedtime. 90 capsule 0    psyllium (Metamucil) 3.4 gram packet Take by mouth.      psyllium husk (METAMUCIL ORAL) 48.57% oral powder. Use as directed. Takes once daily, one scoop       rosuvastatin (Crestor) 20 mg tablet Take 1 tablet (20 mg) by mouth once daily.      spironolactone (Aldactone) 50 mg tablet Take 1 tablet (50 mg) by mouth once daily.      tamsulosin (Flomax) 0.4 mg 24 hr capsule Take 1 capsule (0.4 mg) by mouth once daily.      topiramate (Topamax) 50 mg tablet Take 1 tablet (50 mg) by mouth 2 times a day. 60 tablet 11    vitamin D3-vitamin K2, MK4, 1,000-100 unit-mcg tablet Take by mouth.      warfarin (Coumadin) 10 mg tablet Take 1 tablet (10 mg) by mouth once daily.      warfarin (Coumadin) 5 mg tablet 1 tab(s) orally once a day       No current facility-administered medications for this visit.        ROS  Review of Systems   All other systems reviewed and are negative.      Physical Exam  Physical Exam  Constitutional:       Appearance: He is obese.   HENT:      Head: Normocephalic and atraumatic.   Eyes:      Extraocular Movements: Extraocular movements intact.      Pupils: Pupils are equal, round, and reactive to light.   Cardiovascular:      Rate and Rhythm: Rhythm irregular.   Pulmonary:      Effort: Pulmonary effort is normal.      Breath sounds: Normal breath sounds.   Abdominal:      Palpations: Abdomen is soft.   Musculoskeletal:      Right lower leg: No edema.      Left lower leg: No edema.   Neurological:      Mental Status: He is alert.          EKG  Encounter Date: 11/15/23   ECG 12 Lead    Narrative    Afib at 84/min., old ASWMI,, LAD       Problem List Items Addressed This Visit       (HFpEF) heart failure with preserved ejection fraction (CMS/HCC) - Primary    A-fib (CMS/HCC)     EKG today Afib at 84/min., LAD, old ASWMI         Relevant Orders    ECG 12 Lead (Completed)    Left retinal detachment    Traumatic enucleation of right eye    Obstructive sleep apnea of adult    Morbid obesity (CMS/HCC)    Type 2 diabetes mellitus without complications (CMS/HCC)     Followed by Dr. Dick              No follow-ups on file.      Dutch Milan MD

## 2023-11-27 ENCOUNTER — LAB (OUTPATIENT)
Dept: LAB | Facility: LAB | Age: 63
End: 2023-11-27
Payer: MEDICARE

## 2023-11-27 DIAGNOSIS — Z79.01 LONG TERM (CURRENT) USE OF ANTICOAGULANTS: ICD-10-CM

## 2023-11-27 LAB
INR PPP: 2 (ref 0.9–1.1)
PROTHROMBIN TIME: 23 SECONDS (ref 9.8–12.8)

## 2023-11-27 PROCEDURE — 36415 COLL VENOUS BLD VENIPUNCTURE: CPT

## 2023-11-27 PROCEDURE — 85610 PROTHROMBIN TIME: CPT

## 2023-11-30 ENCOUNTER — TELEMEDICINE CLINICAL SUPPORT (OUTPATIENT)
Dept: NUTRITION | Facility: HOSPITAL | Age: 63
End: 2023-11-30
Payer: MEDICARE

## 2023-11-30 DIAGNOSIS — E13.9 DIABETES MELLITUS OF OTHER TYPE WITHOUT COMPLICATION, UNSPECIFIED WHETHER LONG TERM INSULIN USE (MULTI): Primary | ICD-10-CM

## 2023-11-30 NOTE — LETTER
November 30, 2023     Casey Dick DO  [address]  [address]  [facility] [address]    Patient: Nasir Sandoval   YOB: 1960   Date of Visit: 11/30/2023       Dear Dr. Casey Dick DO:    Thank you for referring Nasir Sandoval to me for evaluation. Below are my notes for this consultation.  If you have questions, please do not hesitate to call me. I look forward to following your patient along with you.       Sincerely,     Danya Bowman, RD, LD      CC: No Recipients  ______________________________________________________________________________________    Reason for Nutrition Visit:  Pt is a 63 y.o. male referred for   · Diabetes mellitus (250.00) (E11.9)   Pt initial assessment completed in [facility]    Past Medical Hx:  Patient Active Problem List   Diagnosis   • (HFpEF) heart failure with preserved ejection fraction (CMS/HCC)   • A-fib (CMS/HCC)   • Combined hyperlipidemia   • Hypertension   • Nausea and vomiting   • Pseudophakia of left eye   • Left retinal detachment   • Traumatic enucleation of right eye   • Achalasia, esophageal   • Cellulitis   • Constipation, chronic   • Dizziness   • Foot pain   • Pain in joint, lower leg   • Neuropathy   • Chest pain   • Choroidal hemorrhage   • Drug abuse (CMS/HCC)   • Obstructive sleep apnea of adult   • Ruptured globe of right eye   • Ureteral stone with hydronephrosis   • Vitreous hemorrhage of right eye (CMS/HCC)   • Chronic alcohol abuse   • Chronic diastolic (congestive) heart failure (CMS/HCC)   • Explosive personality disorder (CMS/HCC)   • Internal hemorrhoids   • Major depressive disorder, recurrent, in partial remission (CMS/HCC)   • Morbid obesity (CMS/HCC)   • Narcissistic personality disorder (CMS/HCC)   • Panic disorder with agoraphobia   • Type 2 diabetes mellitus without complications (CMS/HCC)   • Gastroesophageal reflux disease   • Peripheral neuropathy        Weight change:    Significant Weight Change: No    Lab Results    Component Value Date    HGBA1C 7.0 (A) 08/07/2023    CHOL 83 01/17/2023    LDLF 14 01/17/2023    TRIG 188 (H) 01/17/2023        Food and Nutrition Hx:  Follow up 11/30/23:  Pt reports that he has been trying to eat better and buy better food options from the store. Pt states he has been trying to decrease salt intake, and eat less process foods. Pt reports fasting blood sugars in the 170's, today was 140. Pt reports he has been trying not to eat at night, wants to have 10-12 hour fast at night. Pt reports wt loss - was 360 lbs, now down to 351 lbs. Pt reports that he has not been counting carbs but has been more conscious of eating smaller portions. Pt states he has not been exercising.   - Reviewed goals. Encouraged pt begin exercise regimen for wt loss and T2DM management - pt states he has a stationary bike but has not put it together - pt reports he knows exercise is important.     Follow up 10/26/23:  Pt here today for diabetes education follow up. Pt states he has not been able to implement at dietary changes at this point. Pt expressed anger about his current health status and the fact that his diabetes has gotten worse, development of painful neuropathy which has also attributes to feelings of anger and sadness regarding his health status. Pt reports blood sugar readings average in the 150's. Pt denies changes in wt - states weight this morning was 357 lbs. Pt did report that he has been trying to increase vegetables intake and making his own soups. Pt states he gave information packet that he received at his first appointment to his wife who plans meals and grocery shops. Pt reports his goals are to eat healthier and start exercising.       Allergies: None  Intolerance: None  Appetite: Normal  Intake: >75%  GI Symptoms : None  Swallowing Difficulty: No problems with swallowing  Dentition : own    Types of Activities: Mostly Sedentary     Food Preparation: Patient and Partner/Spouse  Cooking  Skills/Barriers: None reported  Grocery Shopping: Patient and Partner/Spouse       Nutrition Focused Physical Exam:    Performed/Deferred: Deferred due to be being virtual visit    Estimated Energy Needs:  Weight Loss Needs: 1800 kcal/day    Nutrition Diagnosis:    Diagnosis Statement 1:  Diagnosis Status: Ongoing  Diagnosis : Obese related to  excessive energy intake  as evidenced by  BMI 45, daily consuming restaurant foods.      Nutrition Interventions:  Consistent Carbohydrate Diet, Decreased Carbohydrate Diet, and Increased Protein Diet  Nutrition Counseling: Motivational Interviewing  Coordination of Care: None    Nutrition Goals:  Nutrition Goals : Blood glucose control  Consistent meal/snack pattern  Normalized fasting and postprandial blood glucose  Pt goals not met - blood sugar remains >130 in the morning but improving through changes in food intake and eating habits    Nutrition Recommendations:  Via teach back method patient verbalized understanding of the following topics:  1. Eat a bedtime snack that contains a carb and protein to help with morning blood glucose levels  2. Eat 3 servings of carb per meal  3. Plan meals ahead of time  4. Measure foods for accurate portions  5. Read labels for carb content.     Educational Handouts: none at this time.     Readiness to Change : Fair  Level of Understanding : Good  Anticipated Compliant : Fair

## 2023-11-30 NOTE — PROGRESS NOTES
Reason for Nutrition Visit:  Pt is a 63 y.o. male referred for   · Diabetes mellitus (250.00) (E11.9)   Pt initial assessment completed in [facility]    Past Medical Hx:  Patient Active Problem List   Diagnosis    (HFpEF) heart failure with preserved ejection fraction (CMS/HCC)    A-fib (CMS/HCC)    Combined hyperlipidemia    Hypertension    Nausea and vomiting    Pseudophakia of left eye    Left retinal detachment    Traumatic enucleation of right eye    Achalasia, esophageal    Cellulitis    Constipation, chronic    Dizziness    Foot pain    Pain in joint, lower leg    Neuropathy    Chest pain    Choroidal hemorrhage    Drug abuse (CMS/HCC)    Obstructive sleep apnea of adult    Ruptured globe of right eye    Ureteral stone with hydronephrosis    Vitreous hemorrhage of right eye (CMS/HCC)    Chronic alcohol abuse    Chronic diastolic (congestive) heart failure (CMS/HCC)    Explosive personality disorder (CMS/HCC)    Internal hemorrhoids    Major depressive disorder, recurrent, in partial remission (CMS/HCC)    Morbid obesity (CMS/HCC)    Narcissistic personality disorder (CMS/HCC)    Panic disorder with agoraphobia    Type 2 diabetes mellitus without complications (CMS/HCC)    Gastroesophageal reflux disease    Peripheral neuropathy        Weight change:    Significant Weight Change: No    Lab Results   Component Value Date    HGBA1C 7.0 (A) 08/07/2023    CHOL 83 01/17/2023    LDLF 14 01/17/2023    TRIG 188 (H) 01/17/2023        Food and Nutrition Hx:  Follow up 11/30/23:  Pt reports that he has been trying to eat better and buy better food options from the store. Pt states he has been trying to decrease salt intake, and eat less process foods. Pt reports fasting blood sugars in the 170's, today was 140. Pt reports he has been trying not to eat at night, wants to have 10-12 hour fast at night. Pt reports wt loss - was 360 lbs, now down to 351 lbs. Pt reports that he has not been counting carbs but has been more  conscious of eating smaller portions. Pt states he has not been exercising.   - Reviewed goals. Encouraged pt begin exercise regimen for wt loss and T2DM management - pt states he has a stationary bike but has not put it together - pt reports he knows exercise is important.     Follow up 10/26/23:  Pt here today for diabetes education follow up. Pt states he has not been able to implement at dietary changes at this point. Pt expressed anger about his current health status and the fact that his diabetes has gotten worse, development of painful neuropathy which has also attributes to feelings of anger and sadness regarding his health status. Pt reports blood sugar readings average in the 150's. Pt denies changes in wt - states weight this morning was 357 lbs. Pt did report that he has been trying to increase vegetables intake and making his own soups. Pt states he gave information packet that he received at his first appointment to his wife who plans meals and grocery shops. Pt reports his goals are to eat healthier and start exercising.       Allergies: None  Intolerance: None  Appetite: Normal  Intake: >75%  GI Symptoms : None  Swallowing Difficulty: No problems with swallowing  Dentition : own    Types of Activities: Mostly Sedentary     Food Preparation: Patient and Partner/Spouse  Cooking Skills/Barriers: None reported  Grocery Shopping: Patient and Partner/Spouse       Nutrition Focused Physical Exam:    Performed/Deferred: Deferred due to be being virtual visit    Estimated Energy Needs:  Weight Loss Needs: 1800 kcal/day    Nutrition Diagnosis:    Diagnosis Statement 1:  Diagnosis Status: Ongoing  Diagnosis : Obese related to  excessive energy intake  as evidenced by  BMI 45, daily consuming restaurant foods.      Nutrition Interventions:  Consistent Carbohydrate Diet, Decreased Carbohydrate Diet, and Increased Protein Diet  Nutrition Counseling: Motivational Interviewing  Coordination of Care:  None    Nutrition Goals:  Nutrition Goals : Blood glucose control  Consistent meal/snack pattern  Normalized fasting and postprandial blood glucose  Pt goals not met - blood sugar remains >130 in the morning but improving through changes in food intake and eating habits    Nutrition Recommendations:  Via teach back method patient verbalized understanding of the following topics:  1. Eat a bedtime snack that contains a carb and protein to help with morning blood glucose levels  2. Eat 3 servings of carb per meal  3. Plan meals ahead of time  4. Measure foods for accurate portions  5. Read labels for carb content.     Educational Handouts: none at this time.     Readiness to Change : Fair  Level of Understanding : Good  Anticipated Compliant : Fair

## 2023-12-11 ENCOUNTER — APPOINTMENT (OUTPATIENT)
Dept: NEUROLOGY | Facility: HOSPITAL | Age: 63
End: 2023-12-11
Payer: MEDICARE

## 2023-12-22 ENCOUNTER — LAB (OUTPATIENT)
Dept: LAB | Facility: LAB | Age: 63
End: 2023-12-22
Payer: MEDICARE

## 2023-12-22 DIAGNOSIS — Z79.01 LONG TERM (CURRENT) USE OF ANTICOAGULANTS: ICD-10-CM

## 2023-12-22 LAB
INR PPP: 2.2 (ref 0.9–1.1)
PROTHROMBIN TIME: 24.5 SECONDS (ref 9.8–12.8)

## 2023-12-22 PROCEDURE — 85610 PROTHROMBIN TIME: CPT

## 2023-12-22 PROCEDURE — 36415 COLL VENOUS BLD VENIPUNCTURE: CPT

## 2024-01-09 ENCOUNTER — APPOINTMENT (OUTPATIENT)
Dept: NEUROLOGY | Facility: HOSPITAL | Age: 64
End: 2024-01-09
Payer: MEDICARE

## 2024-01-22 ENCOUNTER — LAB (OUTPATIENT)
Dept: LAB | Facility: LAB | Age: 64
End: 2024-01-22
Payer: MEDICARE

## 2024-01-22 DIAGNOSIS — Z79.01 LONG TERM (CURRENT) USE OF ANTICOAGULANTS: ICD-10-CM

## 2024-01-22 LAB
INR PPP: 2.4 (ref 0.9–1.1)
PROTHROMBIN TIME: 27.2 SECONDS (ref 9.8–12.8)

## 2024-01-22 PROCEDURE — 36415 COLL VENOUS BLD VENIPUNCTURE: CPT

## 2024-01-22 PROCEDURE — 85610 PROTHROMBIN TIME: CPT

## 2024-01-29 ENCOUNTER — APPOINTMENT (OUTPATIENT)
Dept: NEUROLOGY | Facility: CLINIC | Age: 64
End: 2024-01-29
Payer: MEDICARE

## 2024-01-30 ENCOUNTER — OFFICE VISIT (OUTPATIENT)
Dept: PRIMARY CARE | Facility: CLINIC | Age: 64
End: 2024-01-30
Payer: MEDICARE

## 2024-01-30 ENCOUNTER — LAB REQUISITION (OUTPATIENT)
Dept: LAB | Facility: HOSPITAL | Age: 64
End: 2024-01-30
Payer: MEDICARE

## 2024-01-30 ENCOUNTER — LAB (OUTPATIENT)
Dept: LAB | Facility: LAB | Age: 64
End: 2024-01-30
Payer: MEDICARE

## 2024-01-30 VITALS
SYSTOLIC BLOOD PRESSURE: 145 MMHG | WEIGHT: 315 LBS | DIASTOLIC BLOOD PRESSURE: 80 MMHG | BODY MASS INDEX: 40.43 KG/M2 | HEIGHT: 74 IN

## 2024-01-30 DIAGNOSIS — E55.9 VITAMIN D DEFICIENCY, UNSPECIFIED: ICD-10-CM

## 2024-01-30 DIAGNOSIS — I50.32 CHRONIC HEART FAILURE WITH PRESERVED EJECTION FRACTION (MULTI): ICD-10-CM

## 2024-01-30 DIAGNOSIS — I50.32 CHRONIC HEART FAILURE WITH PRESERVED EJECTION FRACTION (MULTI): Primary | ICD-10-CM

## 2024-01-30 DIAGNOSIS — I50.32 CHRONIC DIASTOLIC (CONGESTIVE) HEART FAILURE (MULTI): ICD-10-CM

## 2024-01-30 DIAGNOSIS — M10.9 GOUT, UNSPECIFIED CAUSE, UNSPECIFIED CHRONICITY, UNSPECIFIED SITE: ICD-10-CM

## 2024-01-30 DIAGNOSIS — E11.9 TYPE 2 DIABETES MELLITUS WITHOUT COMPLICATIONS (MULTI): Primary | ICD-10-CM

## 2024-01-30 DIAGNOSIS — E78.5 HYPERLIPIDEMIA, UNSPECIFIED: ICD-10-CM

## 2024-01-30 DIAGNOSIS — M65.30 TRIGGER FINGER, UNSPECIFIED FINGER, UNSPECIFIED LATERALITY: ICD-10-CM

## 2024-01-30 DIAGNOSIS — E11.9 TYPE 2 DIABETES MELLITUS WITHOUT COMPLICATION, WITHOUT LONG-TERM CURRENT USE OF INSULIN (MULTI): ICD-10-CM

## 2024-01-30 LAB
25(OH)D3 SERPL-MCNC: 36 NG/ML (ref 30–100)
CREAT UR-MCNC: 1.5 MG/DL (ref 20–370)
EST. AVERAGE GLUCOSE BLD GHB EST-MCNC: 163 MG/DL
HBA1C MFR BLD: 7.3 %
MICROALBUMIN UR-MCNC: 71.6 MG/L
MICROALBUMIN/CREAT UR: 4773.3 UG/MG CREAT
TSH SERPL-ACNC: 0.96 MIU/L (ref 0.44–3.98)

## 2024-01-30 PROCEDURE — 1036F TOBACCO NON-USER: CPT | Performed by: STUDENT IN AN ORGANIZED HEALTH CARE EDUCATION/TRAINING PROGRAM

## 2024-01-30 PROCEDURE — 84443 ASSAY THYROID STIM HORMONE: CPT

## 2024-01-30 PROCEDURE — 82306 VITAMIN D 25 HYDROXY: CPT

## 2024-01-30 PROCEDURE — 3077F SYST BP >= 140 MM HG: CPT | Performed by: STUDENT IN AN ORGANIZED HEALTH CARE EDUCATION/TRAINING PROGRAM

## 2024-01-30 PROCEDURE — 99214 OFFICE O/P EST MOD 30 MIN: CPT | Performed by: STUDENT IN AN ORGANIZED HEALTH CARE EDUCATION/TRAINING PROGRAM

## 2024-01-30 PROCEDURE — 3079F DIAST BP 80-89 MM HG: CPT | Performed by: STUDENT IN AN ORGANIZED HEALTH CARE EDUCATION/TRAINING PROGRAM

## 2024-01-30 PROCEDURE — 80053 COMPREHEN METABOLIC PANEL: CPT

## 2024-01-30 PROCEDURE — 36415 COLL VENOUS BLD VENIPUNCTURE: CPT

## 2024-01-30 PROCEDURE — 84550 ASSAY OF BLOOD/URIC ACID: CPT

## 2024-01-30 PROCEDURE — 82570 ASSAY OF URINE CREATININE: CPT

## 2024-01-30 PROCEDURE — 82043 UR ALBUMIN QUANTITATIVE: CPT

## 2024-01-30 PROCEDURE — 83036 HEMOGLOBIN GLYCOSYLATED A1C: CPT

## 2024-01-30 RX ORDER — SIMVASTATIN 10 MG/1
10 TABLET, FILM COATED ORAL NIGHTLY
Status: ON HOLD | COMMUNITY
End: 2024-06-02 | Stop reason: ALTCHOICE

## 2024-01-30 ASSESSMENT — ENCOUNTER SYMPTOMS
CONSTITUTIONAL NEGATIVE: 1
GASTROINTESTINAL NEGATIVE: 1
CARDIOVASCULAR NEGATIVE: 1
RESPIRATORY NEGATIVE: 1
NEUROLOGICAL NEGATIVE: 1
PSYCHIATRIC NEGATIVE: 1
MUSCULOSKELETAL NEGATIVE: 1

## 2024-01-30 NOTE — PROGRESS NOTES
"Subjective   Patient ID: Nasir Sandoval is a 63 y.o. male who presents for Follow-up.    Patient seen in routine follow-up.  States that he is overall doing well he is tolerating his medications without any issues.  Fluid status is overall stable as well.  In addition, has started noticing that he has locking of his fingers in his left hand.  Typically has to pry them open.  States that this is worsening as of late.  In addition, has gained some weight, but breathing is overall stable and this is likely secondary to sedentary aspect.  Regards no acute issues or concerns.  Is wondering about a EMG upcoming.  Denies any additional issues.         Review of Systems   Constitutional: Negative.    HENT: Negative.     Respiratory: Negative.     Cardiovascular: Negative.    Gastrointestinal: Negative.    Musculoskeletal: Negative.         Locking of hands   Skin: Negative.    Neurological: Negative.    Psychiatric/Behavioral: Negative.         Objective   Ht 1.88 m (6' 2\")   Wt (!) 163 kg (359 lb)   BMI 46.09 kg/m²     Physical Exam  Constitutional:       General: He is not in acute distress.     Appearance: He is not ill-appearing.   Eyes:      Pupils: Pupils are equal, round, and reactive to light.   Cardiovascular:      Rate and Rhythm: Normal rate and regular rhythm.      Pulses: Normal pulses.      Heart sounds: No murmur heard.  Pulmonary:      Effort: No respiratory distress.      Breath sounds: No wheezing.   Abdominal:      General: Abdomen is flat. Bowel sounds are normal. There is no distension.   Musculoskeletal:      Right lower leg: No edema.      Left lower leg: No edema.      Comments: Trigger finger on left pinky   Skin:     General: Skin is warm and dry.   Neurological:      Mental Status: He is alert. Mental status is at baseline.      Cranial Nerves: No cranial nerve deficit.      Motor: No weakness.   Psychiatric:         Mood and Affect: Mood normal.         Behavior: Behavior normal. "         Assessment/Plan   Problem List Items Addressed This Visit             ICD-10-CM    (HFpEF) heart failure with preserved ejection fraction (CMS/[facility]) - Primary I50.30    Relevant Orders    TSH with reflex to Free T4 if abnormal    Chronic diastolic (congestive) heart failure (CMS/[facility]) I50.32    Type 2 diabetes mellitus without complications (CMS/[facility]) E11.9     Other Visit Diagnoses         Codes    Trigger finger, unspecified finger, unspecified laterality     M65.30    Relevant Orders    Referral to Orthopaedic Surgery    Gout, unspecified cause, unspecified chronicity, unspecified site     M10.9    Relevant Orders    Uric acid                   PT seen on follow up     #elevated uric acid  #gout  Stable at this time encourage avoiding risk factors, recheck uric acid today     #PTSD  Initiation of Minipress, has weaned himself off Valium continue current medication advised follow-up with psych, could uptitrate medication if worsens     #HFpEF  Advise low-salt diet, increase diuretics, follow-up with cardiology continue all other cardiac meds overall stable however has gained weight closely monitor obtain labs today     #Neuropathy  Diabetes significantly contributing has follow-up with neurologist however has not gotten nerve conduction test ordered today he is maxed out on gabapentin.  He is wondering about EMG, recommend due to diagnostic potential      #Trigger finger  Noted on exam, recommend Ortho hand follow-up he is bracing it at this time he will consider in the future    #COPD stable continue current medications    #Diabetes  #Insomnia  #Neuropathy  #Hypertension  #BPH  [facility], obtain lab work today continue lifestyle modifications, follow-up on labs     #Health maintenance  Labs reviewed  Advise age-appropriate vaccinations  Up-to-date on routine testing     Return to care in 3 to 5 months or as needed

## 2024-01-31 LAB
ALBUMIN SERPL BCP-MCNC: 4.3 G/DL (ref 3.4–5)
ALP SERPL-CCNC: 57 U/L (ref 33–136)
ALT SERPL W P-5'-P-CCNC: 24 U/L (ref 10–52)
ANION GAP SERPL CALC-SCNC: 17 MMOL/L (ref 10–20)
AST SERPL W P-5'-P-CCNC: 20 U/L (ref 9–39)
BILIRUB SERPL-MCNC: 0.3 MG/DL (ref 0–1.2)
BUN SERPL-MCNC: 25 MG/DL (ref 6–23)
CALCIUM SERPL-MCNC: 9.5 MG/DL (ref 8.6–10.6)
CHLORIDE SERPL-SCNC: 105 MMOL/L (ref 98–107)
CO2 SERPL-SCNC: 25 MMOL/L (ref 21–32)
CREAT SERPL-MCNC: 1.27 MG/DL (ref 0.5–1.3)
CREAT UR-MCNC: 100.3 MG/DL (ref 20–370)
EGFRCR SERPLBLD CKD-EPI 2021: 64 ML/MIN/1.73M*2
GLUCOSE SERPL-MCNC: 171 MG/DL (ref 74–99)
MICROALBUMIN UR-MCNC: 75.3 MG/L
MICROALBUMIN/CREAT UR: 75.1 UG/MG CREAT
POTASSIUM SERPL-SCNC: 4.4 MMOL/L (ref 3.5–5.3)
PROT SERPL-MCNC: 7.9 G/DL (ref 6.4–8.2)
SODIUM SERPL-SCNC: 143 MMOL/L (ref 136–145)
URATE SERPL-MCNC: 8 MG/DL (ref 4–7.5)

## 2024-02-05 ENCOUNTER — APPOINTMENT (OUTPATIENT)
Dept: NEUROLOGY | Facility: HOSPITAL | Age: 64
End: 2024-02-05
Payer: MEDICARE

## 2024-02-06 ENCOUNTER — OFFICE VISIT (OUTPATIENT)
Dept: OPHTHALMOLOGY | Facility: CLINIC | Age: 64
End: 2024-02-06
Payer: COMMERCIAL

## 2024-02-06 DIAGNOSIS — H33.22 LEFT RETINAL DETACHMENT: Primary | ICD-10-CM

## 2024-02-06 PROCEDURE — 92134 CPTRZ OPH DX IMG PST SGM RTA: CPT | Mod: BILATERAL PROCEDURE

## 2024-02-06 PROCEDURE — 99213 OFFICE O/P EST LOW 20 MIN: CPT

## 2024-02-06 ASSESSMENT — TONOMETRY
IOP_METHOD: TONOPEN
OS_IOP_MMHG: 187

## 2024-02-06 ASSESSMENT — SLIT LAMP EXAM - LIDS
COMMENTS: GOOD POSITION
COMMENTS: GOOD POSITION

## 2024-02-06 ASSESSMENT — EXTERNAL EXAM - LEFT EYE: OS_EXAM: NORMAL

## 2024-02-06 ASSESSMENT — EXTERNAL EXAM - RIGHT EYE: OD_EXAM: PROSTHESIS

## 2024-02-06 ASSESSMENT — VISUAL ACUITY
OS_SC: 20/20
OD_SC: PROSTHESIS
METHOD: SNELLEN - LINEAR

## 2024-02-06 ASSESSMENT — CUP TO DISC RATIO: OS_RATIO: .4

## 2024-02-06 NOTE — PROGRESS NOTES
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    - s/p laser retinopexy OS on 11/30/2018.    - SRF within the barrier laser has resolved;     - CR scar inferiorly at 6 oclock   - f/u 6 month     mild NPDR OS   - No DME   - A1c is 7.3 (1/24)    OCT 02/06/24     - Right eye (OD): Normal foveal contour, RPE, outer and inner retinal layers. No IRF or SRF    - Left eye (OS): Normal foveal contour, RPE  outer and inner retinal layers. No IRF or SRF     Prosthesis, right eye    - Traumatic enucleation of right eye    - continue to monitor       Lens status:   - OS: PCIOL

## 2024-02-15 PROBLEM — R09.02 HYPOXEMIA: Status: ACTIVE | Noted: 2024-02-15

## 2024-02-15 PROBLEM — R53.83 FATIGUE: Status: ACTIVE | Noted: 2023-07-18

## 2024-02-15 PROBLEM — E61.1 IRON DEFICIENCY: Status: ACTIVE | Noted: 2023-07-18

## 2024-02-15 PROBLEM — F43.10 POSTTRAUMATIC STRESS DISORDER: Status: ACTIVE | Noted: 2022-12-09

## 2024-02-15 PROBLEM — H43.819 POSTERIOR VITREOUS DETACHMENT: Status: ACTIVE | Noted: 2018-11-30

## 2024-02-15 PROBLEM — U07.1 DISEASE DUE TO SEVERE ACUTE RESPIRATORY SYNDROME CORONAVIRUS 2 (SARS-COV-2): Status: ACTIVE | Noted: 2024-02-15

## 2024-02-15 PROBLEM — D64.9 ANEMIA: Status: ACTIVE | Noted: 2023-07-18

## 2024-02-15 PROBLEM — M10.9 GOUT: Status: ACTIVE | Noted: 2023-07-18

## 2024-02-15 PROBLEM — E11.9 DIABETES MELLITUS (MULTI): Status: ACTIVE | Noted: 2022-10-18

## 2024-02-19 DIAGNOSIS — R52 INADEQUATE PAIN CONTROL: Primary | ICD-10-CM

## 2024-02-20 ENCOUNTER — APPOINTMENT (OUTPATIENT)
Dept: CARDIOLOGY | Facility: HOSPITAL | Age: 64
DRG: 312 | End: 2024-02-20
Payer: MEDICARE

## 2024-02-20 ENCOUNTER — APPOINTMENT (OUTPATIENT)
Dept: RADIOLOGY | Facility: HOSPITAL | Age: 64
DRG: 312 | End: 2024-02-20
Payer: MEDICARE

## 2024-02-20 ENCOUNTER — HOSPITAL ENCOUNTER (INPATIENT)
Facility: HOSPITAL | Age: 64
LOS: 1 days | Discharge: HOME | DRG: 312 | End: 2024-02-22
Attending: STUDENT IN AN ORGANIZED HEALTH CARE EDUCATION/TRAINING PROGRAM | Admitting: STUDENT IN AN ORGANIZED HEALTH CARE EDUCATION/TRAINING PROGRAM
Payer: MEDICARE
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
## 2024-02-20 DIAGNOSIS — R11.2 NAUSEA AND VOMITING, UNSPECIFIED VOMITING TYPE: ICD-10-CM

## 2024-02-20 DIAGNOSIS — R55 SYNCOPE, UNSPECIFIED SYNCOPE TYPE: Primary | ICD-10-CM

## 2024-02-20 DIAGNOSIS — S01.01XA LACERATION OF SCALP WITHOUT FOREIGN BODY, INITIAL ENCOUNTER: ICD-10-CM

## 2024-02-20 DIAGNOSIS — K59.03 DRUG-INDUCED CONSTIPATION: ICD-10-CM

## 2024-02-20 DIAGNOSIS — R55 SYNCOPE AND COLLAPSE: ICD-10-CM

## 2024-02-20 DIAGNOSIS — R52 PAIN: ICD-10-CM

## 2024-02-20 LAB
ALBUMIN SERPL BCP-MCNC: 4.2 G/DL (ref 3.4–5)
ALP SERPL-CCNC: 69 U/L (ref 33–136)
ALT SERPL W P-5'-P-CCNC: 34 U/L (ref 10–52)
AMPHETAMINES UR QL SCN: ABNORMAL
ANION GAP SERPL CALC-SCNC: 15 MMOL/L (ref 10–20)
APPEARANCE UR: CLEAR
APTT PPP: 36 SECONDS (ref 27–38)
AST SERPL W P-5'-P-CCNC: 30 U/L (ref 9–39)
BARBITURATES UR QL SCN: ABNORMAL
BASOPHILS # BLD AUTO: 0.06 X10*3/UL (ref 0–0.1)
BASOPHILS NFR BLD AUTO: 0.7 %
BENZODIAZ UR QL SCN: ABNORMAL
BILIRUB SERPL-MCNC: 0.3 MG/DL (ref 0–1.2)
BILIRUB UR STRIP.AUTO-MCNC: NEGATIVE MG/DL
BUN SERPL-MCNC: 27 MG/DL (ref 6–23)
BZE UR QL SCN: ABNORMAL
CALCIUM SERPL-MCNC: 9.5 MG/DL (ref 8.6–10.3)
CANNABINOIDS UR QL SCN: ABNORMAL
CARDIAC TROPONIN I PNL SERPL HS: 13 NG/L (ref 0–20)
CHLORIDE SERPL-SCNC: 102 MMOL/L (ref 98–107)
CK SERPL-CCNC: 332 U/L (ref 0–325)
CO2 SERPL-SCNC: 26 MMOL/L (ref 21–32)
COLOR UR: YELLOW
CREAT SERPL-MCNC: 1.16 MG/DL (ref 0.5–1.3)
EGFRCR SERPLBLD CKD-EPI 2021: 71 ML/MIN/1.73M*2
EOSINOPHIL # BLD AUTO: 0.32 X10*3/UL (ref 0–0.7)
EOSINOPHIL NFR BLD AUTO: 3.9 %
ERYTHROCYTE [DISTWIDTH] IN BLOOD BY AUTOMATED COUNT: 14.7 % (ref 11.5–14.5)
FENTANYL+NORFENTANYL UR QL SCN: ABNORMAL
FLUAV RNA RESP QL NAA+PROBE: NOT DETECTED
FLUBV RNA RESP QL NAA+PROBE: NOT DETECTED
GLUCOSE BLD MANUAL STRIP-MCNC: 150 MG/DL (ref 74–99)
GLUCOSE SERPL-MCNC: 155 MG/DL (ref 74–99)
GLUCOSE UR STRIP.AUTO-MCNC: NEGATIVE MG/DL
HCT VFR BLD AUTO: 40.4 % (ref 41–52)
HGB BLD-MCNC: 13.2 G/DL (ref 13.5–17.5)
IMM GRANULOCYTES # BLD AUTO: 0.08 X10*3/UL (ref 0–0.7)
IMM GRANULOCYTES NFR BLD AUTO: 1 % (ref 0–0.9)
INR PPP: 1.9 (ref 0.9–1.1)
KETONES UR STRIP.AUTO-MCNC: NEGATIVE MG/DL
LACTATE SERPL-SCNC: 2.7 MMOL/L (ref 0.4–2)
LACTATE SERPL-SCNC: 2.8 MMOL/L (ref 0.4–2)
LEUKOCYTE ESTERASE UR QL STRIP.AUTO: NEGATIVE
LIPASE SERPL-CCNC: 29 U/L (ref 9–82)
LYMPHOCYTES # BLD AUTO: 1.56 X10*3/UL (ref 1.2–4.8)
LYMPHOCYTES NFR BLD AUTO: 18.8 %
MCH RBC QN AUTO: 32 PG (ref 26–34)
MCHC RBC AUTO-ENTMCNC: 32.7 G/DL (ref 32–36)
MCV RBC AUTO: 98 FL (ref 80–100)
MONOCYTES # BLD AUTO: 0.57 X10*3/UL (ref 0.1–1)
MONOCYTES NFR BLD AUTO: 6.9 %
NEUTROPHILS # BLD AUTO: 5.72 X10*3/UL (ref 1.2–7.7)
NEUTROPHILS NFR BLD AUTO: 68.7 %
NITRITE UR QL STRIP.AUTO: NEGATIVE
NRBC BLD-RTO: 0 /100 WBCS (ref 0–0)
OPIATES UR QL SCN: ABNORMAL
OXYCODONE+OXYMORPHONE UR QL SCN: ABNORMAL
PCP UR QL SCN: ABNORMAL
PH UR STRIP.AUTO: 6 [PH]
PLATELET # BLD AUTO: 171 X10*3/UL (ref 150–450)
POTASSIUM SERPL-SCNC: 3.8 MMOL/L (ref 3.5–5.3)
PROT SERPL-MCNC: 7.7 G/DL (ref 6.4–8.2)
PROT UR STRIP.AUTO-MCNC: NEGATIVE MG/DL
PROTHROMBIN TIME: 21.1 SECONDS (ref 9.8–12.8)
RBC # BLD AUTO: 4.12 X10*6/UL (ref 4.5–5.9)
RBC # UR STRIP.AUTO: NEGATIVE /UL
RSV RNA RESP QL NAA+PROBE: NOT DETECTED
SARS-COV-2 RNA RESP QL NAA+PROBE: NOT DETECTED
SODIUM SERPL-SCNC: 139 MMOL/L (ref 136–145)
SP GR UR STRIP.AUTO: 1.01
UROBILINOGEN UR STRIP.AUTO-MCNC: <2 MG/DL
WBC # BLD AUTO: 8.3 X10*3/UL (ref 4.4–11.3)

## 2024-02-20 PROCEDURE — 84484 ASSAY OF TROPONIN QUANT: CPT | Performed by: STUDENT IN AN ORGANIZED HEALTH CARE EDUCATION/TRAINING PROGRAM

## 2024-02-20 PROCEDURE — 96376 TX/PRO/DX INJ SAME DRUG ADON: CPT | Mod: 59

## 2024-02-20 PROCEDURE — 90715 TDAP VACCINE 7 YRS/> IM: CPT | Performed by: STUDENT IN AN ORGANIZED HEALTH CARE EDUCATION/TRAINING PROGRAM

## 2024-02-20 PROCEDURE — 83605 ASSAY OF LACTIC ACID: CPT | Performed by: STUDENT IN AN ORGANIZED HEALTH CARE EDUCATION/TRAINING PROGRAM

## 2024-02-20 PROCEDURE — 72131 CT LUMBAR SPINE W/O DYE: CPT

## 2024-02-20 PROCEDURE — 36415 COLL VENOUS BLD VENIPUNCTURE: CPT | Performed by: STUDENT IN AN ORGANIZED HEALTH CARE EDUCATION/TRAINING PROGRAM

## 2024-02-20 PROCEDURE — 81003 URINALYSIS AUTO W/O SCOPE: CPT | Performed by: STUDENT IN AN ORGANIZED HEALTH CARE EDUCATION/TRAINING PROGRAM

## 2024-02-20 PROCEDURE — 70450 CT HEAD/BRAIN W/O DYE: CPT | Performed by: RADIOLOGY

## 2024-02-20 PROCEDURE — 74177 CT ABD & PELVIS W/CONTRAST: CPT | Performed by: RADIOLOGY

## 2024-02-20 PROCEDURE — 82947 ASSAY GLUCOSE BLOOD QUANT: CPT

## 2024-02-20 PROCEDURE — 70486 CT MAXILLOFACIAL W/O DYE: CPT

## 2024-02-20 PROCEDURE — 2500000005 HC RX 250 GENERAL PHARMACY W/O HCPCS

## 2024-02-20 PROCEDURE — 96374 THER/PROPH/DIAG INJ IV PUSH: CPT | Mod: 59

## 2024-02-20 PROCEDURE — 0HQ0XZZ REPAIR SCALP SKIN, EXTERNAL APPROACH: ICD-10-PCS | Performed by: STUDENT IN AN ORGANIZED HEALTH CARE EDUCATION/TRAINING PROGRAM

## 2024-02-20 PROCEDURE — G0378 HOSPITAL OBSERVATION PER HR: HCPCS

## 2024-02-20 PROCEDURE — 74177 CT ABD & PELVIS W/CONTRAST: CPT

## 2024-02-20 PROCEDURE — 76377 3D RENDER W/INTRP POSTPROCES: CPT | Performed by: RADIOLOGY

## 2024-02-20 PROCEDURE — 96361 HYDRATE IV INFUSION ADD-ON: CPT | Mod: 59

## 2024-02-20 PROCEDURE — 85730 THROMBOPLASTIN TIME PARTIAL: CPT | Performed by: STUDENT IN AN ORGANIZED HEALTH CARE EDUCATION/TRAINING PROGRAM

## 2024-02-20 PROCEDURE — 72125 CT NECK SPINE W/O DYE: CPT

## 2024-02-20 PROCEDURE — 93005 ELECTROCARDIOGRAM TRACING: CPT

## 2024-02-20 PROCEDURE — 80307 DRUG TEST PRSMV CHEM ANLYZR: CPT | Performed by: STUDENT IN AN ORGANIZED HEALTH CARE EDUCATION/TRAINING PROGRAM

## 2024-02-20 PROCEDURE — 71260 CT THORAX DX C+: CPT | Performed by: RADIOLOGY

## 2024-02-20 PROCEDURE — 73030 X-RAY EXAM OF SHOULDER: CPT | Mod: BILATERAL PROCEDURE | Performed by: RADIOLOGY

## 2024-02-20 PROCEDURE — 2500000004 HC RX 250 GENERAL PHARMACY W/ HCPCS (ALT 636 FOR OP/ED)

## 2024-02-20 PROCEDURE — G0390 TRAUMA RESPONS W/HOSP CRITI: HCPCS

## 2024-02-20 PROCEDURE — 12001 RPR S/N/AX/GEN/TRNK 2.5CM/<: CPT | Performed by: STUDENT IN AN ORGANIZED HEALTH CARE EDUCATION/TRAINING PROGRAM

## 2024-02-20 PROCEDURE — 83690 ASSAY OF LIPASE: CPT | Performed by: STUDENT IN AN ORGANIZED HEALTH CARE EDUCATION/TRAINING PROGRAM

## 2024-02-20 PROCEDURE — 71045 X-RAY EXAM CHEST 1 VIEW: CPT | Performed by: RADIOLOGY

## 2024-02-20 PROCEDURE — 70486 CT MAXILLOFACIAL W/O DYE: CPT | Performed by: RADIOLOGY

## 2024-02-20 PROCEDURE — 87637 SARSCOV2&INF A&B&RSV AMP PRB: CPT | Performed by: STUDENT IN AN ORGANIZED HEALTH CARE EDUCATION/TRAINING PROGRAM

## 2024-02-20 PROCEDURE — 99222 1ST HOSP IP/OBS MODERATE 55: CPT

## 2024-02-20 PROCEDURE — 85610 PROTHROMBIN TIME: CPT | Performed by: STUDENT IN AN ORGANIZED HEALTH CARE EDUCATION/TRAINING PROGRAM

## 2024-02-20 PROCEDURE — 2500000004 HC RX 250 GENERAL PHARMACY W/ HCPCS (ALT 636 FOR OP/ED): Performed by: STUDENT IN AN ORGANIZED HEALTH CARE EDUCATION/TRAINING PROGRAM

## 2024-02-20 PROCEDURE — 90471 IMMUNIZATION ADMIN: CPT | Performed by: STUDENT IN AN ORGANIZED HEALTH CARE EDUCATION/TRAINING PROGRAM

## 2024-02-20 PROCEDURE — 72128 CT CHEST SPINE W/O DYE: CPT

## 2024-02-20 PROCEDURE — 72125 CT NECK SPINE W/O DYE: CPT | Performed by: RADIOLOGY

## 2024-02-20 PROCEDURE — 76377 3D RENDER W/INTRP POSTPROCES: CPT

## 2024-02-20 PROCEDURE — 2550000001 HC RX 255 CONTRASTS: Performed by: STUDENT IN AN ORGANIZED HEALTH CARE EDUCATION/TRAINING PROGRAM

## 2024-02-20 PROCEDURE — 70450 CT HEAD/BRAIN W/O DYE: CPT

## 2024-02-20 PROCEDURE — 85025 COMPLETE CBC W/AUTO DIFF WBC: CPT | Performed by: STUDENT IN AN ORGANIZED HEALTH CARE EDUCATION/TRAINING PROGRAM

## 2024-02-20 PROCEDURE — 82550 ASSAY OF CK (CPK): CPT | Performed by: STUDENT IN AN ORGANIZED HEALTH CARE EDUCATION/TRAINING PROGRAM

## 2024-02-20 PROCEDURE — 80053 COMPREHEN METABOLIC PANEL: CPT | Performed by: STUDENT IN AN ORGANIZED HEALTH CARE EDUCATION/TRAINING PROGRAM

## 2024-02-20 PROCEDURE — 99291 CRITICAL CARE FIRST HOUR: CPT | Performed by: STUDENT IN AN ORGANIZED HEALTH CARE EDUCATION/TRAINING PROGRAM

## 2024-02-20 PROCEDURE — 73030 X-RAY EXAM OF SHOULDER: CPT | Mod: 50

## 2024-02-20 PROCEDURE — 71045 X-RAY EXAM CHEST 1 VIEW: CPT

## 2024-02-20 PROCEDURE — 96375 TX/PRO/DX INJ NEW DRUG ADDON: CPT | Mod: 59

## 2024-02-20 RX ORDER — LISINOPRIL 2.5 MG/1
2.5 TABLET ORAL DAILY
COMMUNITY
Start: 2024-02-01

## 2024-02-20 RX ORDER — DEXTROSE 50 % IN WATER (D50W) INTRAVENOUS SYRINGE
25
Status: DISCONTINUED | OUTPATIENT
Start: 2024-02-20 | End: 2024-02-22 | Stop reason: HOSPADM

## 2024-02-20 RX ORDER — DULOXETIN HYDROCHLORIDE 30 MG/1
60 CAPSULE, DELAYED RELEASE ORAL DAILY
Status: DISCONTINUED | OUTPATIENT
Start: 2024-02-21 | End: 2024-02-22 | Stop reason: HOSPADM

## 2024-02-20 RX ORDER — POLYETHYLENE GLYCOL 3350 17 G/17G
17 POWDER, FOR SOLUTION ORAL DAILY
Status: DISCONTINUED | OUTPATIENT
Start: 2024-02-21 | End: 2024-02-22 | Stop reason: HOSPADM

## 2024-02-20 RX ORDER — METOCLOPRAMIDE 10 MG/1
10 TABLET ORAL EVERY 6 HOURS PRN
Status: DISCONTINUED | OUTPATIENT
Start: 2024-02-20 | End: 2024-02-22 | Stop reason: HOSPADM

## 2024-02-20 RX ORDER — ACETAMINOPHEN 650 MG/1
650 SUPPOSITORY RECTAL EVERY 4 HOURS PRN
Status: DISCONTINUED | OUTPATIENT
Start: 2024-02-20 | End: 2024-02-22 | Stop reason: HOSPADM

## 2024-02-20 RX ORDER — GABAPENTIN 400 MG/1
800 CAPSULE ORAL 3 TIMES DAILY
Status: DISCONTINUED | OUTPATIENT
Start: 2024-02-20 | End: 2024-02-22 | Stop reason: HOSPADM

## 2024-02-20 RX ORDER — INSULIN LISPRO 100 [IU]/ML
0-10 INJECTION, SOLUTION INTRAVENOUS; SUBCUTANEOUS
Status: DISCONTINUED | OUTPATIENT
Start: 2024-02-21 | End: 2024-02-22 | Stop reason: HOSPADM

## 2024-02-20 RX ORDER — WARFARIN SODIUM 5 MG/1
10 TABLET ORAL
Status: DISCONTINUED | OUTPATIENT
Start: 2024-02-27 | End: 2024-02-22 | Stop reason: HOSPADM

## 2024-02-20 RX ORDER — ROSUVASTATIN CALCIUM 10 MG/1
20 TABLET, COATED ORAL NIGHTLY
Status: DISCONTINUED | OUTPATIENT
Start: 2024-02-20 | End: 2024-02-22 | Stop reason: HOSPADM

## 2024-02-20 RX ORDER — ACETAMINOPHEN 325 MG/1
650 TABLET ORAL EVERY 4 HOURS PRN
Status: DISCONTINUED | OUTPATIENT
Start: 2024-02-20 | End: 2024-02-22 | Stop reason: HOSPADM

## 2024-02-20 RX ORDER — ONDANSETRON HYDROCHLORIDE 2 MG/ML
4 INJECTION, SOLUTION INTRAVENOUS EVERY 8 HOURS PRN
Status: DISCONTINUED | OUTPATIENT
Start: 2024-02-20 | End: 2024-02-22 | Stop reason: HOSPADM

## 2024-02-20 RX ORDER — FUROSEMIDE 40 MG/1
120 TABLET ORAL 2 TIMES DAILY
COMMUNITY

## 2024-02-20 RX ORDER — POTASSIUM CITRATE 5 MEQ/1
15 TABLET, EXTENDED RELEASE ORAL NIGHTLY
COMMUNITY

## 2024-02-20 RX ORDER — FENTANYL CITRATE 50 UG/ML
50 INJECTION, SOLUTION INTRAMUSCULAR; INTRAVENOUS ONCE
Status: COMPLETED | OUTPATIENT
Start: 2024-02-20 | End: 2024-02-20

## 2024-02-20 RX ORDER — ACETAMINOPHEN 325 MG/1
650 TABLET ORAL EVERY 4 HOURS
Status: DISCONTINUED | OUTPATIENT
Start: 2024-02-20 | End: 2024-02-22 | Stop reason: HOSPADM

## 2024-02-20 RX ORDER — METOCLOPRAMIDE HYDROCHLORIDE 5 MG/ML
10 INJECTION INTRAMUSCULAR; INTRAVENOUS EVERY 6 HOURS PRN
Status: DISCONTINUED | OUTPATIENT
Start: 2024-02-20 | End: 2024-02-22 | Stop reason: HOSPADM

## 2024-02-20 RX ORDER — LISINOPRIL 5 MG/1
2.5 TABLET ORAL DAILY
Status: DISCONTINUED | OUTPATIENT
Start: 2024-02-21 | End: 2024-02-22 | Stop reason: HOSPADM

## 2024-02-20 RX ORDER — GABAPENTIN 400 MG/1
800 CAPSULE ORAL 3 TIMES DAILY
COMMUNITY

## 2024-02-20 RX ORDER — DULOXETIN HYDROCHLORIDE 60 MG/1
1 CAPSULE, DELAYED RELEASE ORAL DAILY
COMMUNITY
Start: 2023-11-01

## 2024-02-20 RX ORDER — ONDANSETRON HYDROCHLORIDE 2 MG/ML
4 INJECTION, SOLUTION INTRAVENOUS ONCE
Status: COMPLETED | OUTPATIENT
Start: 2024-02-20 | End: 2024-02-20

## 2024-02-20 RX ORDER — ACETAMINOPHEN 160 MG/5ML
650 SOLUTION ORAL EVERY 4 HOURS PRN
Status: DISCONTINUED | OUTPATIENT
Start: 2024-02-20 | End: 2024-02-22 | Stop reason: HOSPADM

## 2024-02-20 RX ORDER — GLUCOSAM/CHONDRO/HERB 149/HYAL 750-100 MG
1 TABLET ORAL 2 TIMES DAILY
COMMUNITY

## 2024-02-20 RX ORDER — DEXTROSE MONOHYDRATE 100 MG/ML
0.3 INJECTION, SOLUTION INTRAVENOUS ONCE AS NEEDED
Status: DISCONTINUED | OUTPATIENT
Start: 2024-02-20 | End: 2024-02-22 | Stop reason: HOSPADM

## 2024-02-20 RX ORDER — ONDANSETRON 4 MG/1
4 TABLET, FILM COATED ORAL EVERY 8 HOURS PRN
Status: DISCONTINUED | OUTPATIENT
Start: 2024-02-20 | End: 2024-02-22 | Stop reason: HOSPADM

## 2024-02-20 RX ORDER — LIDOCAINE 560 MG/1
1 PATCH PERCUTANEOUS; TOPICAL; TRANSDERMAL DAILY
Status: DISCONTINUED | OUTPATIENT
Start: 2024-02-20 | End: 2024-02-22 | Stop reason: HOSPADM

## 2024-02-20 RX ORDER — DIGOXIN 125 MCG
125 TABLET ORAL DAILY
Status: DISCONTINUED | OUTPATIENT
Start: 2024-02-21 | End: 2024-02-22 | Stop reason: HOSPADM

## 2024-02-20 RX ADMIN — TETANUS TOXOID, REDUCED DIPHTHERIA TOXOID AND ACELLULAR PERTUSSIS VACCINE, ADSORBED 0.5 ML: 5; 2.5; 8; 8; 2.5 SUSPENSION INTRAMUSCULAR at 18:11

## 2024-02-20 RX ADMIN — HYDROMORPHONE HYDROCHLORIDE 0.2 MG: 0.2 INJECTION, SOLUTION INTRAMUSCULAR; INTRAVENOUS; SUBCUTANEOUS at 21:55

## 2024-02-20 RX ADMIN — IOHEXOL 140 ML: 350 INJECTION, SOLUTION INTRAVENOUS at 17:28

## 2024-02-20 RX ADMIN — ONDANSETRON 4 MG: 2 INJECTION INTRAMUSCULAR; INTRAVENOUS at 16:40

## 2024-02-20 RX ADMIN — SODIUM CHLORIDE, POTASSIUM CHLORIDE, SODIUM LACTATE AND CALCIUM CHLORIDE 1000 ML: 600; 310; 30; 20 INJECTION, SOLUTION INTRAVENOUS at 16:39

## 2024-02-20 RX ADMIN — HYDROMORPHONE HYDROCHLORIDE 0.5 MG: 1 INJECTION, SOLUTION INTRAMUSCULAR; INTRAVENOUS; SUBCUTANEOUS at 18:09

## 2024-02-20 RX ADMIN — METOCLOPRAMIDE 10 MG: 5 INJECTION, SOLUTION INTRAMUSCULAR; INTRAVENOUS at 21:40

## 2024-02-20 RX ADMIN — FENTANYL CITRATE 50 MCG: 50 INJECTION INTRAMUSCULAR; INTRAVENOUS at 16:40

## 2024-02-20 RX ADMIN — LIDOCAINE 1 PATCH: 4 PATCH TOPICAL at 22:06

## 2024-02-20 RX ADMIN — ONDANSETRON 4 MG: 2 INJECTION INTRAMUSCULAR; INTRAVENOUS at 19:55

## 2024-02-20 ASSESSMENT — PAIN DESCRIPTION - LOCATION: LOCATION: CHEST

## 2024-02-20 ASSESSMENT — PAIN - FUNCTIONAL ASSESSMENT
PAIN_FUNCTIONAL_ASSESSMENT: 0-10
PAIN_FUNCTIONAL_ASSESSMENT: 0-10

## 2024-02-20 ASSESSMENT — LIFESTYLE VARIABLES
EVER HAD A DRINK FIRST THING IN THE MORNING TO STEADY YOUR NERVES TO GET RID OF A HANGOVER: NO
HAVE YOU EVER FELT YOU SHOULD CUT DOWN ON YOUR DRINKING: NO
HAVE PEOPLE ANNOYED YOU BY CRITICIZING YOUR DRINKING: NO
EVER FELT BAD OR GUILTY ABOUT YOUR DRINKING: NO

## 2024-02-20 ASSESSMENT — PAIN SCALES - GENERAL
PAINLEVEL_OUTOF10: 10 - WORST POSSIBLE PAIN
PAINLEVEL_OUTOF10: 9

## 2024-02-20 NOTE — PROGRESS NOTES
Pharmacy Medication History Review    Nasir Sandoval is a 63 y.o. male admitted for No Principal Problem: There is no principal problem currently on the Problem List. Please update the Problem List and refresh.. Pharmacy reviewed the patient's [id number] medications and allergies for accuracy.    The list below reflectives the updated PTA list. Please review each medication in order reconciliation for additional clarification and justification.  Prior to Admission medications    Medication Sig Start Date End Date Taking? Authorizing Provider   DULoxetine (Cymbalta) 60 mg DR capsule Take 1 capsule (60 mg) by mouth once daily. 11/1/23  Yes Historical Provider, MD   lisinopril 2.5 mg tablet Take 1 tablet (2.5 mg) by mouth once daily. 2/1/24  Yes Historical Provider, MD   albuterol 90 mcg/actuation inhaler Inhale 2 puffs every 6 hours if needed for shortness of breath.   Yes Historical Provider, MD   ammonium lactate (Amlactin) 12 % cream APPLY A SMALL AMOUNT EXTERNALLY TWICE A DAY TO AFFECTED AREAS OF DRY SKIN AS NEEDED 5/9/22   Historical Provider, MD   bacitracin 500 unit/gram ointment APPLY A SUFFICIENT AMOUNT EXTERNALLY TWICE A DAY AS NEEDED AS DIRECTED 4/19/22   Historical Provider, MD   carboxymethylcellulose (Refresh Plus) 0.5 % ophthalmic solution Administer 1 drop into both eyes 3 times a day as needed for dry eyes. 5/2/22  Yes Historical Provider, MD   cetirizine (ZyrTEC) 5 mg tablet Take 1 tablet (5 mg) by mouth once daily. 6/27/22   Historical Provider, MD   cholecalciferol (Vitamin D-3) 25 MCG (1000 UT) tablet Take 2 tablets (50 mcg) by mouth once daily. 4/11/22  Yes Historical Provider, MD   clindamycin (Cleocin T) 1 % external solution APPLY A SUFFICIENT AMOUNT EXTERNALLY TWICE A DAY TO SCROTUM AS NEEDED FOR LESION 5/9/22  no Historical Provider, MD   digoxin (Lanoxin) 125 MCG tablet Take 1 tablet (125 mcg) by mouth once daily. 4/11/22  Yes Historical Provider, MD   docosahexaenoic acid/epa (FISH  OIL ORAL) Take 1 capsule by mouth 2 times a day.    Historical Provider, MD   docusate sodium (Colace) 100 mg capsule Take 2 capsules (200 mg) by mouth 2 times a day.   Yes Historical Provider, MD   erythromycin (Romycin) 5 mg/gram (0.5 %) ophthalmic ointment APPLY A HALF INCH RIBBON TO LEFT EYE IN THE MORNING 5/2/22  no Historical Provider, MD   furosemide (Lasix) 40 mg tablet Take 3 tablets (120 mg) by mouth 2 times a day.   Yes Historical Provider, MD   furosemide (Lasix) 80 mg tablet Take 1 tablet (80 mg) by mouth 2 times a day. 8/1/23 10/30/23 no Casey Dick,    gabapentin (Neurontin) 400 mg capsule Take 2 capsules (800 mg) by mouth 3 times a day.   Yes Historical Provider, MD   gabapentin (Neurontin) 800 mg tablet Take 1 tablet (800 mg) by mouth 3 times a day. 10/25/23 11/24/23 no Vicente Gerardo MD PhD   glipiZIDE (Glucotrol) 10 mg tablet Take 1 tablet (10 mg) by mouth 2 times a day before meals.   Yes Historical Provider, MD   ketoconazole (NIZOral) 2 % shampoo SHAMPOO A SUFFICIENT AMOUNT EXTERNALLY EVERY OTHER DAY .  LATHER ONTO THE SCALP, FACE, BEHIND EARS FOR 5 MINUTES EVERY OTHER DAY  THEN RINSE .  LATHER ONTO THE SCALP, FACE, BEHIND EARS FOR 5 MINUTES EVERY OTHER DAY   THEN RINSE 5/9/22  no Historical Provider, MD   KRILL OIL ORAL Take by mouth every 12 hours.   no Historical Provider, MD   levoFLOXacin (Levaquin) 750 mg tablet  9/21/22  no Historical Provider, MD   magnesium oxide (Mag-Ox) 400 mg (241.3 mg magnesium) tablet Take 1 tablet (400 mg) by mouth twice a day. 9/23/16  Yes Historical Provider, MD   multivitamin tablet Take 1 tablet by mouth once daily.   Yes Historical Provider, MD   omega 3-dha-epa-fish oil (Fish OiL) 1,000 mg (120 mg-180 mg) capsule Take by mouth.   no Historical Provider, MD   omega 3-dha-epa-fish oil (Fish OiL) 1,000 mg (120 mg-180 mg) capsule Take 1 capsule (1,000 mg) by mouth 2 times a day.   Yes Historical Provider, MD   potassium chloride CR (Klor-Con M20) 20  mEq ER tablet Take 2 tablets (40 mEq) by mouth 2 times a day. 4/11/22  Yes Historical Provider, MD   potassium citrate CR (Urocit-K-5) 5 mEq ER tablet Take 3 tablets (15 mEq) by mouth once daily at bedtime. Do not crush, chew, or split.   Yes Historical Provider, MD   prazosin (Minipress) 2 mg capsule Take 1 capsule (2 mg) by mouth once daily at bedtime. 7/18/23 10/16/23 no Casey Dick,    psyllium (Metamucil) 3.4 gram packet Take by mouth. 2/28/23  no Historical Provider, MD   psyllium husk (METAMUCIL ORAL) Take 1 Scoop by mouth once daily.   Yes Historical Provider, MD   rosuvastatin (Crestor) 20 mg tablet Take 1 tablet (20 mg) by mouth once daily at bedtime. 9/20/22  Yes Historical Provider, MD   simvastatin (Zocor) 10 mg tablet Take 1 tablet (10 mg) by mouth once daily at bedtime.   no Historical Provider, MD   spironolactone (Aldactone) 50 mg tablet Take 1 tablet (50 mg) by mouth once daily. 4/11/22  Yes Historical Provider, MD   tamsulosin (Flomax) 0.4 mg 24 hr capsule Take 2 capsules (0.8 mg) by mouth once daily at bedtime.   Yes Historical Provider, MD   topiramate (Topamax) 50 mg tablet Take 1 tablet (50 mg) by mouth 2 times a day. 10/25/23 10/24/24 Yes Vicente Gerardo MD PhD   vitamin D3-vitamin K2, MK4, 1,000-100 unit-mcg tablet Take by mouth.   no Historical Provider, MD   warfarin (Coumadin) 10 mg tablet Take 1 tablet (10 mg) by mouth once daily. 7/31/13  no Historical Provider, MD   warfarin (Coumadin) 5 mg tablet Take by mouth. Take 2.5 tabs 6 times weekly and 2 tabs once weekly on Tuesday   Yes Historical Provider, MD        The list below reflectives the updated allergy list. Please review each documented allergy for additional clarification and justification.  Allergies  Reviewed by Jo Wisdom RN on 2/20/2024        Severity Reactions Comments    Penicillins Not Specified Other Siva Wall RN Registered Nurse Signed  ED Notes Service date: 09/01/2014 2:49 PM    Unasyn test dose  given Sq per MD request w/o reaction of any kind after 20 minutes.  Medication administered as ordered.            Below are additional concerns with the patient's PTA list.      Mita Mazariegos CPhT

## 2024-02-20 NOTE — ED PROVIDER NOTES
EMERGENCY DEPARTMENT ENCOUNTER      Pt Name: Nasir Sandoval  MRN: [id number]  Birthdate 1960  Date of evaluation: 2/20/2024  Provider: Luis Wong DO    CHIEF COMPLAINT       Chief Complaint   Patient presents with    Fall     PT. BROUGHT TO ED BY WIFE. WIFE STATES THAT SHE CAME HOME FROM STORE, FOUND PT. AT BOTTOM OF STAIRS, UNCONSCIOUS. WIFE STATES APPROX. 10 STAIRS, GOT PT. UP AND CLEANED UP PRIOR TO COMING TO ED. PT. AWAKE, ALERT IN ED, STATES DOES NOT REMEMBER THE FALL. PT. C/O PAIN ACROSS UPPER CHEST/NANI SHOULDERS. LAC NOTED TO RIGHT POSTERIOR HEAD. BRUISING NOTED TO FOREHEAD. WIFE STATES PT. ON COUMADIN.       HISTORY OF PRESENT ILLNESS    Nasir Sandoval is a 63 y.o. male who presents to the emergency department with Spouse for fall down a flight of stairs.  Patient states that he woke up this morning having generalized weakness as well as a nonproductive cough felt fatigue.  He let the dog outdoors was on his stair landing and this is the last thing that he recalls.  He woke up on the floor unable to get up.  He suspects he was on the ground for approximately 15 to 20 minutes when his spouse found him.  He states he is never syncopized or passed out in the past.  He is on warfarin for history of atrial fibrillation.  Admits to bilateral shoulder pain neck pain and anterior chest pain which started after the fall.  No further associated injuries at this time.          Nursing Notes were reviewed.    REVIEW OF SYSTEMS     CONSTITUTIONAL: Denies fever, sweats, chills.   NEURO: Denies difficulty walking, numbness, weakness, tingling, headache.   HEENT: Denies sore throat, rhinorrhea, changes in vision.   CARDIO: Endorses chest pain.  Denies palpitations.  PULM: Denies shortness of breath, cough.   GI: Denies abdominal pain, nausea, vomiting, diarrhea, constipation, melena, hematochezia.  : Denies painful urination, frequency, hematuria.   MSK: Endorses fall with syncope.  SKIN: Denies rash, lesions.    ENDOCRINE: Denies unexpected weight-loss.   HEME: Denies bleeding disorder.     PAST MEDICAL HISTORY     Past Medical History:   Diagnosis Date    VALERIANO (acute kidney injury) (CMS/HCC) 05/15/2012    Avulsion of left eye, initial encounter 2018    Traumatic enucleation of left eye    Avulsion of left eye, initial encounter 2018    Traumatic enucleation of left eye    Carcinoma in situ of prostate 10/31/2023    Epidermal cyst 10/31/2023    Esophageal dysphagia 10/31/2023    Heart failure (CMS/HCC) 10/31/2023    Heart failure, unspecified (CMS/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 No pertinent surgical history.    ALLERGIES     Penicillins    FAMILY HISTORY     No family history on file.     SOCIAL HISTORY       Social History     Socioeconomic History    Marital status:      Spouse name: None    Number of children: None    Years of education: None    Highest education level: None   Occupational History    None   Tobacco Use    Smoking status: Former     Types: Cigarettes     Quit date: 1980     Years since quittin.1    Smokeless tobacco: Never   Substance and Sexual Activity    Alcohol use: Not Currently    Drug use: Never    Sexual activity: None   Other Topics Concern    None   Social History Narrative    None     Social Determinants of Health     Financial Resource Strain: Not on file   Food Insecurity: Not on file   Transportation Needs: Not on file   Physical Activity: Not on file   Stress: Not on file   Social Connections: Not on file   Intimate Partner Violence: Not on  file   Housing Stability: Not on file       PHYSICAL EXAM   VS: As documented in the triage note from today's date and EMR flowsheet were reviewed.  Gen: Obese. No acute distress. Seated in bed. Appears nontoxic.  GCS 15 alert and oriented to person time place.  Skin: Warm. Dry. Intact. No rashes or lesions.  Dried blood at the left nares.  Laceration to the posterior occiput right-sided measuring approximately 1.5 cm hemostatic.  No surrounding cellulitis or signs of infection.  Eyes: Pupils equally round and reactive to light. Clear sclera. EOMI.  HENT: Atraumatic appearance. Mucosal membranes moist. No oral lesions, uvula midline, airway patent.  TMs clear bilaterally nares clear bilaterally no evidence of nasal septal hematoma trachea is midline no evidence of basilar skull fracture there is midline cervical tenderness no step-offs.  Due to patient's body habitus c-collar will not fit therefore we will use head blocks.  CV: Regular rate and regular rhythm. S1, S2.  Trace bilateral pedal edema. Warm extremities.  Resp: Nonlabored breathing Clear to auscultation bilaterally. No increased work of breathing.   GI: Soft and nontender. No rebound or guarding. Bowel sounds x4 present.   MSK: Symmetric muscle bulk. No joint swelling in the extremities. Compartments are soft. Neurovascularly intact x4 extremities. Radial pulses +2 equal bilaterally.  Reproducible anterior chest wall pain throughout.  Extremities are atraumatic.  Pedal pulses +2 equal bilaterally pelvis is stable.  No T or L-spine tenderness.  Neuro: Alert. CN II - XII intact. Speech fluent. Moving all extremities. No focal deficits.   Psych: Appropriate. Kempt.    DIAGNOSTIC RESULTS   RADIOLOGY:   Non-plain film images such as CT, Ultrasound and MRI are read by the radiologist. Plain radiographic images are visualized and preliminarily interpreted by the emergency physician with the below findings: Chest x-ray without evidence of pneumothorax or rib  fractures.      Interpretation per the Radiologist below, if available at the time of this note:    XR chest 1 view   Final Result   No acute cardiopulmonary process.        MACRO:   None        Signed by: Sohail Murillo 2/20/2024 6:06 PM   Dictation workstation:   [id number]      XR shoulder 2+ views bilateral   Final Result   No acute fracture.        Bilateral shoulder osteoarthrosis.        MACRO:   None        Signed by: Sohail Murillo 2/20/2024 6:10 PM   Dictation workstation:   [id number]      CT head wo IV contrast   Final Result   Body habitus does limit sensitivity.        CT HEAD:   No acute intracranial abnormality or calvarial fracture.   Soft tissue injury seen in the right frontal parietal region   senescent changes        CT CERVICAL SPINE:   No acute fracture or traumatic malalignment of the cervical spine.   Fairly severe spinal stenosis and multilevel degenerative disc   disease most prominent C4-5 and C5-6. If concern for nerve root   impingement, consider MRI        Signed by: Eddy Rosado 2/20/2024 5:45 PM   Dictation workstation:   [id number]      CT cervical spine wo IV contrast   Final Result   Body habitus does limit sensitivity.        CT HEAD:   No acute intracranial abnormality or calvarial fracture.   Soft tissue injury seen in the right frontal parietal region   senescent changes        CT CERVICAL SPINE:   No acute fracture or traumatic malalignment of the cervical spine.   Fairly severe spinal stenosis and multilevel degenerative disc   disease most prominent C4-5 and C5-6. If concern for nerve root   impingement, consider MRI        Signed by: Eddy Rosado 2/20/2024 5:45 PM   Dictation workstation:   [id number]      CT thoracic spine wo IV contrast   Final Result   Markedly limited examination due to body habitus.        Heart size is enlarged. Vascular calcification. No pneumothorax or   pleural effusion. There is bronchial thickening.        No definite thoracic  spine fracture although significant limitations   again noted due to artifact from large body habitus. No definite   acute lumbar spine fracture.        Markedly dense liver compatible with fatty infiltration.        Renal cysts. 1 there is a cyst on the left may be mildly complex.   Ultrasound is unlikely to be any utility given body habitus. Consider   further evaluation with renal MRI to better evaluate and exclude any   component of complex renal cyst or neoplasm.        Presumed bilateral adenomas. These also can be interrogated on   ultrasound.        Signed by: Eddy Rosado 2/20/2024 5:57 PM   Dictation workstation:   [id number]      CT lumbar spine wo IV contrast   Final Result   Markedly limited examination due to body habitus.        Heart size is enlarged. Vascular calcification. No pneumothorax or   pleural effusion. There is bronchial thickening.        No definite thoracic spine fracture although significant limitations   again noted due to artifact from large body habitus. No definite   acute lumbar spine fracture.        Markedly dense liver compatible with fatty infiltration.        Renal cysts. 1 there is a cyst on the left may be mildly complex.   Ultrasound is unlikely to be any utility given body habitus. Consider   further evaluation with renal MRI to better evaluate and exclude any   component of complex renal cyst or neoplasm.        Presumed bilateral adenomas. These also can be interrogated on   ultrasound.        Signed by: Eddy Rosado 2/20/2024 5:57 PM   Dictation workstation:   [id number]      CT chest abdomen pelvis w IV contrast   Final Result   Markedly limited examination due to body habitus.        Heart size is enlarged. Vascular calcification. No pneumothorax or   pleural effusion. There is bronchial thickening.        No definite thoracic spine fracture although significant limitations   again noted due to artifact from large body habitus. No definite   acute lumbar  spine fracture.        Markedly dense liver compatible with fatty infiltration.        Renal cysts. 1 there is a cyst on the left may be mildly complex.   Ultrasound is unlikely to be any utility given body habitus. Consider   further evaluation with renal MRI to better evaluate and exclude any   component of complex renal cyst or neoplasm.        Presumed bilateral adenomas. These also can be interrogated on   ultrasound.        Signed by: Eddy Rosado 2/20/2024 5:57 PM   Dictation workstation:   [id number]      CT maxillofacial bones wo IV contrast   Final Result   No acute facial bone fracture visualized. Paranasal sinus mucosal   thickening.        MACRO:   None        Signed by: Eddy Rosado 2/20/2024 5:48 PM   Dictation workstation:   [id number]      CT 3D reconstruction   Final Result   No acute facial bone fracture visualized. Paranasal sinus mucosal   thickening.        MACRO:   None        Signed by: Eddy Rosado 2/20/2024 5:48 PM   Dictation workstation:   [id number]      XR shoulder left 2+ views    (Results Pending)   Transthoracic Echo (TTE) Complete    (Results Pending)         ED BEDSIDE ULTRASOUND:   Performed by ED Physician - none    LABS:  Labs Reviewed   CBC WITH AUTO DIFFERENTIAL - Abnormal       Result Value    WBC 8.3      nRBC 0.0      RBC 4.12 (*)     Hemoglobin 13.2 (*)     Hematocrit 40.4 (*)     MCV 98      MCH 32.0      MCHC 32.7      RDW 14.7 (*)     Platelets 171      Neutrophils % 68.7      Immature Granulocytes %, Automated 1.0 (*)     Lymphocytes % 18.8      Monocytes % 6.9      Eosinophils % 3.9      Basophils % 0.7      Neutrophils Absolute 5.72      Immature Granulocytes Absolute, Automated 0.08      Lymphocytes Absolute 1.56      Monocytes Absolute 0.57      Eosinophils Absolute 0.32      Basophils Absolute 0.06     COMPREHENSIVE METABOLIC PANEL - Abnormal    Glucose 155 (*)     Sodium 139      Potassium 3.8      Chloride 102      Bicarbonate 26      Anion Gap  15      Urea Nitrogen 27 (*)     Creatinine 1.16      eGFR 71      Calcium 9.5      Albumin 4.2      Alkaline Phosphatase 69      Total Protein 7.7      AST 30      Bilirubin, Total 0.3      ALT 34     LACTATE - Abnormal    Lactate 2.7 (*)     Narrative:     Venipuncture immediately after or during the administration of Metamizole may lead to falsely low results. Testing should be performed immediately  prior to Metamizole dosing.   PROTIME-INR - Abnormal    Protime 21.1 (*)     INR 1.9 (*)    CREATINE KINASE - Abnormal    Creatine Kinase 332 (*)    LACTATE - Abnormal    Lactate 2.8 (*)     Narrative:     Venipuncture immediately after or during the administration of Metamizole may lead to falsely low results. Testing should be performed immediately  prior to Metamizole dosing.   DRUG SCREEN,URINE - Abnormal    Amphetamine Screen, Urine Presumptive Negative      Barbiturate Screen, Urine Presumptive Negative      Benzodiazepines Screen, Urine Presumptive Negative      Cannabinoid Screen, Urine Presumptive Positive (*)     Cocaine Metabolite Screen, Urine Presumptive Negative      Fentanyl Screen, Urine Presumptive Negative      Opiate Screen, Urine Presumptive Negative      Oxycodone Screen, Urine Presumptive Negative      PCP Screen, Urine Presumptive Negative      Narrative:     Drug screen results are presumptive and should not be used to assess   compliance with prescribed medication. Contact the performing [facility] laboratory   to add-on definitive confirmatory testing if clinically indicated.    Toxicology screening results are reported qualitatively. The concentration must   be greater than or equal to the cutoff to be reported as positive. The concentration   at which the screening test can detect an individual drug or metabolite varies.   The absence of expected drug(s) and/or drug metabolite(s) may indicate non-compliance,   inappropriate timing of specimen collection relative to drug administration, poor drug    absorption, diluted/adulterated urine, or limitations of testing. For medical purposes   only; not valid for forensic use.    Interpretive questions should be directed to the laboratory medical directors.   POCT GLUCOSE - Abnormal    POCT Glucose 150 (*)    APTT - Normal    aPTT 36      Narrative:     The APTT is no longer used for monitoring Unfractionated Heparin Therapy. For monitoring Heparin Therapy, use the Heparin Assay.   TROPONIN I, HIGH SENSITIVITY - Normal    Troponin I, High Sensitivity 13      Narrative:     Less than 99th percentile of normal range cutoff-  Female and children under 18 years old <14 ng/L; Male <21 ng/L: Negative  Repeat testing should be performed if clinically indicated.     Female and children under 18 years old 14-50 ng/L; Male 21-50 ng/L:  Consistent with possible cardiac damage and possible increased clinical   risk. Serial measurements may help to assess extent of myocardial damage.     >50 ng/L: Consistent with cardiac damage, increased clinical risk and  myocardial infarction. Serial measurements may help assess extent of   myocardial damage.      NOTE: Children less than 1 year old may have higher baseline troponin   levels and results should be interpreted in conjunction with the overall   clinical context.     NOTE: Troponin I testing is performed using a different   testing methodology at [facility] than at other   [facility]. Direct result comparisons should only   be made within the same method.   SARS-COV-2 AND INFLUENZA A/B PCR - Normal    Flu A Result Not Detected      Flu B Result Not Detected      Coronavirus 2019, PCR Not Detected      Narrative:     This assay has received FDA Emergency Use Authorization (EUA) and  is only authorized for the duration of time that circumstances exist to justify the authorization of the emergency use of in vitro diagnostic tests for the detection of SARS-CoV-2 virus and/or diagnosis of COVID-19 infection under  section 564(b)(1) of the Act, 21 U.S.C. 360bbb-3(b)(1). Testing for SARS-CoV-2 is only recommended for patients who meet current clinical and/or epidemiological criteria as defined by federal, state, or local public health directives. This assay is an in vitro diagnostic nucleic acid amplification test for the qualitative detection of SARS-CoV-2, Influenza A, and Influenza B from nasopharyngeal specimens and has been validated for use at [facility]. Negative results do not preclude COVID-19 infections or Influenza A/B infections, and should not be used as the sole basis for diagnosis, treatment, or other management decisions. If Influenza A/B and RSV PCR results are negative, testing for Parainfluenza virus, Adenovirus and Metapneumovirus is routinely performed for [facility] pediatric oncology and intensive care inpatients, and is available on other patients by placing an add-on request.    RSV PCR - Normal    RSV PCR Not Detected      Narrative:     This assay is an FDA-cleared, in vitro diagnostic nucleic acid amplification test for the detection of RSV from nasopharyngeal specimens, and has been validated for use at [facility]. Negative results do not preclude RSV infections, and should not be used as the sole basis for diagnosis, treatment, or other management decisions. If Influenza A/B and RSV PCR results are negative, testing for Parainfluenza virus, Adenovirus and Metapneumovirus is routinely performed for pediatric oncology and intensive care inpatients at [facility], and is available on other patients by placing an add-on request.       URINALYSIS WITH REFLEX MICROSCOPIC - Normal    Color, Urine Yellow      Appearance, Urine Clear      Specific Gravity, Urine 1.014      pH, Urine 6.0      Protein, Urine NEGATIVE      Glucose, Urine NEGATIVE      Blood, Urine NEGATIVE      Ketones, Urine NEGATIVE      Bilirubin, Urine NEGATIVE      Urobilinogen, Urine <2.0      Nitrite,  Urine NEGATIVE      Leukocyte Esterase, Urine NEGATIVE     LIPASE - Normal    Lipase 29      Narrative:     Venipuncture immediately after or during the administration of Metamizole may lead to falsely low results. Testing should be performed immediately prior to Metamizole dosing.   TYPE AND SCREEN   CBC WITH AUTO DIFFERENTIAL   MAGNESIUM   BASIC METABOLIC PANEL   PROTIME-INR       All other labs were within normal range or not returned as of this dictation.    EMERGENCY DEPARTMENT COURSE/MDM:   Vitals:    Vitals:    02/20/24 1831 02/20/24 1900 02/20/24 1959 02/20/24 2209   BP: 172/79 144/63 (!) 179/94 135/66   BP Location:       Patient Position:       Pulse: 93 (!) 116 (!) 118 96   Resp:   20 18   Temp:       TempSrc:       SpO2: 94% 95% 94%    Weight:       Height:           I reviewed the patient's triage vitals and they are hypertensive recommend follow-up with primary physician for repeat checks.    Due to the above findings the following was ordered basic labs to include EKG CT imaging of the CTL spine head chest abdomen pelvis.  INR level.  Viral swabs.    Limited trauma called upon arrival secondary to syncope with fall and on thinners.    Lab work reveals chronically elevated BUN denies any bloody or tarry stools.  Hyperglycemic in setting of nonfasting blood glucose.  CK minimally elevated was given fluids.  Cardiac troponin negative no acute injury pattern on EKG doubt atypical ACS no runs of arrhythmias in the emergency department either.  Urinalysis not consistent with UTI.  INR is subtherapeutic.  Mild anemia slightly lower than previous baseline denies any bloody or tarry stools recommended close monitoring.  Viral swabs are negative.  CT imaging shows no acute fracture or intracranial bleed or acute findings.  Incidental findings were discussed with the patient recommended close follow-up.  Laceration on the posterior scalp was repaired with a total of 4 staples see suture note for further details.   Recommending admission to the hospital due to unclear etiology of syncope and persistent chest pain.  I do suspect rib contusion at this time low concern for ACS.  Pain started after fall as well.  I discussed findings with the primary physician he did recommend we add on urine drug screen.  He is excepted admission for further treatment and evaluation.  Patient agreeable with plan appreciative of care.    Total of 4 staples placed no indication for antibiotic prophylaxis at this time.    Laceration Repair    Performed by: Luis Wong DO  Authorized by: Luis Wong DO    Consent:     Consent obtained:  Verbal    Consent given by:  Patient    Risks, benefits, and alternatives were discussed: yes      Risks discussed:  Infection, need for additional repair, nerve damage, poor wound healing, poor cosmetic result, pain, retained foreign body, tendon damage and vascular damage    Alternatives discussed:  No treatment, delayed treatment, observation and referral  Universal protocol:     Procedure explained and questions answered to patient or proxy's satisfaction: yes      Relevant documents present and verified: yes      Test results available: yes      Imaging studies available: yes      Required blood products, implants, devices, and special equipment available: yes      Site/side marked: yes      Immediately prior to procedure, a time out was called: yes      Patient identity confirmed:  Verbally with patient and arm band  Anesthesia:     Anesthesia method: IV meds.  Laceration details:     Location:  Scalp    Scalp location:  Occipital    Length (cm):  1.5    Depth (mm):  1  Pre-procedure details:     Preparation:  Patient was prepped and draped in usual sterile fashion and imaging obtained to evaluate for foreign bodies  Exploration:     Limited defect created (wound extended): no      Hemostasis achieved with:  Direct pressure    Imaging obtained comment:  CT    Imaging outcome: foreign body not  noted      Wound exploration: wound explored through full range of motion and entire depth of wound visualized      Wound extent: no areolar tissue violation noted, no fascia violation noted, no foreign bodies/material noted, no muscle damage noted, no nerve damage noted, no tendon damage noted, no underlying fracture noted and no vascular damage noted      Wound extent comment:  Skin    Contaminated: no    Treatment:     Area cleansed with:  Saline    Amount of cleaning:  Standard    Irrigation solution:  Sterile saline    Irrigation volume:  1L    Irrigation method:  Pressure wash    Visualized foreign bodies/material removed: no      Debridement:  None    Undermining:  None    Scar revision: no      Layers repaired: Skin.  Skin repair:     Repair method:  Staples    Number of staples:  4  Approximation:     Approximation:  Close  Repair type:     Repair type:  Simple  Post-procedure details:     Dressing:  Open (no dressing)    Procedure completion:  Tolerated well, no immediate complications  Critical Care    Performed by: Luis Wong DO  Authorized by: Luis Wong DO    Critical care provider statement:     Critical care time (minutes):  33    Critical care time was exclusive of:  Separately billable procedures and treating other patients    Critical care was necessary to treat or prevent imminent or life-threatening deterioration of the following conditions:  Trauma    Critical care was time spent personally by me on the following activities:  Discussions with primary provider, evaluation of patient's response to treatment, examination of patient, re-evaluation of patient's condition, pulse oximetry, ordering and review of radiographic studies, ordering and review of laboratory studies, ordering and performing treatments and interventions and review of old charts    Care discussed with: admitting provider          ED Course as of 02/21/24 0034   Tu Feb 20, 2024   1822 Interpreted by the  Emergency Department Attending: ECG revealed atrial fibrillation at a rate of 98 beats per minute with WA interval * , QRS of 104 , QTc of 477.  No acute injury pattern. Previous EKG on September 12 revealed no significant changes.    [MG]      ED Course User Index  [MG] Luis DO Marvin         Diagnoses as of 02/21/24 0034   Syncope, unspecified syncope type   Fall, initial encounter   Laceration of scalp without foreign body, initial encounter       Patient was counseled regarding labs, imaging, likely diagnosis, and plan. All questions were answered.     ------------------------------------------------------------------  Information provided by the patient and spouse  Consults patient's PCP  Past medical history complicating workup currently on warfarin for atrial fibrillation  Previous medical records reviewed previous office visit 1/30/2024  Considered discharge home although no clear etiology for syncope at this time  Shared medical decision making for local anesthetic 4 staples patient would prefer to hold off.  ------------------------------------------------------------------  ED Medications administered this visit:    Medications   lactated Ringer's bolus 1,000 mL (0 mL intravenous Stopped 2/20/24 1739)   fentaNYL PF (Sublimaze) injection 50 mcg (50 mcg intravenous Given 2/20/24 1640)   ondansetron (Zofran) injection 4 mg (4 mg intravenous Given 2/20/24 1640)   diphth,pertus(acell),tetanus (BoostRIX) 2.5-8-5 Lf-mcg-Lf/0.5mL vaccine 0.5 mL (0.5 mL intramuscular Given 2/20/24 1811)   iohexol (OMNIPaque) 350 mg iodine/mL solution 140 mL (140 mL intravenous Given 2/20/24 1728)   HYDROmorphone (Dilaudid) injection 0.5 mg (0.5 mg intravenous Given 2/20/24 1809)   ondansetron (Zofran) injection 4 mg (4 mg intravenous Given 2/20/24 1955)   HYDROmorphone PF (Dilaudid) injection 0.2 mg (0.2 mg intravenous Given 2/20/24 2155)        Final Impression:   1. Syncope, unspecified syncope type    2. Fall, initial  encounter    3. Syncope and collapse    4. Laceration of scalp without foreign body, initial encounter          Luis Wong DO    (Please note that portions of this note were completed with a voice recognition program.  Efforts were made to edit the dictations but occasionally words are mis-transcribed.)     Luis Wong DO  02/21/24 0034       Luis Wong DO  02/21/24 0043

## 2024-02-20 NOTE — PROGRESS NOTES
Pharmacy Medication History Review    Nasir Sandoval is a 63 y.o. male admitted for No Principal Problem: There is no principal problem currently on the Problem List. Please update the Problem List and refresh.. Pharmacy reviewed the patient's pwitg-xa-cwwuboxpt medications and allergies for accuracy.    The list below reflectives the updated PTA list. Please review each medication in order reconciliation for additional clarification and justification.  Prior to Admission medications    Medication Sig Start Date End Date Taking? Authorizing Provider   DULoxetine (Cymbalta) 60 mg DR capsule Take 1 capsule (60 mg) by mouth once daily. 11/1/23  Yes Historical Provider, MD   lisinopril 2.5 mg tablet Take 1 tablet (2.5 mg) by mouth once daily. 2/1/24  Yes Historical Provider, MD   albuterol 90 mcg/actuation inhaler Inhale 2 puffs every 6 hours if needed for shortness of breath.   Yes Historical Provider, MD   ammonium lactate (Amlactin) 12 % cream APPLY A SMALL AMOUNT EXTERNALLY TWICE A DAY TO AFFECTED AREAS OF DRY SKIN AS NEEDED 5/9/22   Historical Provider, MD   bacitracin 500 unit/gram ointment APPLY A SUFFICIENT AMOUNT EXTERNALLY TWICE A DAY AS NEEDED AS DIRECTED 4/19/22   Historical Provider, MD   carboxymethylcellulose (Refresh Plus) 0.5 % ophthalmic solution Administer 1 drop into both eyes 3 times a day as needed for dry eyes. 5/2/22  Yes Historical Provider, MD   cetirizine (ZyrTEC) 5 mg tablet Take 1 tablet (5 mg) by mouth once daily. 6/27/22   Historical Provider, MD   cholecalciferol (Vitamin D-3) 25 MCG (1000 UT) tablet Take 2 tablets (50 mcg) by mouth once daily. 4/11/22  Yes Historical Provider, MD   clindamycin (Cleocin T) 1 % external solution APPLY A SUFFICIENT AMOUNT EXTERNALLY TWICE A DAY TO SCROTUM AS NEEDED FOR LESION 5/9/22  no Historical Provider, MD   digoxin (Lanoxin) 125 MCG tablet Take 1 tablet (125 mcg) by mouth once daily. 4/11/22  Yes Historical Provider, MD   docosahexaenoic acid/epa (FISH  OIL ORAL) Take 1 capsule by mouth 2 times a day.    Historical Provider, MD   docusate sodium (Colace) 100 mg capsule Take 2 capsules (200 mg) by mouth 2 times a day.   Yes Historical Provider, MD   erythromycin (Romycin) 5 mg/gram (0.5 %) ophthalmic ointment APPLY A HALF INCH RIBBON TO LEFT EYE IN THE MORNING 5/2/22  no Historical Provider, MD   furosemide (Lasix) 40 mg tablet Take 3 tablets (120 mg) by mouth 2 times a day.   Yes Historical Provider, MD   furosemide (Lasix) 80 mg tablet Take 1 tablet (80 mg) by mouth 2 times a day. 8/1/23 10/30/23 no Casey Dick,    gabapentin (Neurontin) 400 mg capsule Take 2 capsules (800 mg) by mouth 3 times a day.   Yes Historical Provider, MD   gabapentin (Neurontin) 800 mg tablet Take 1 tablet (800 mg) by mouth 3 times a day. 10/25/23 11/24/23 no Vicente Gerardo MD PhD   glipiZIDE (Glucotrol) 10 mg tablet Take 1 tablet (10 mg) by mouth 2 times a day before meals.   Yes Historical Provider, MD   ketoconazole (NIZOral) 2 % shampoo SHAMPOO A SUFFICIENT AMOUNT EXTERNALLY EVERY OTHER DAY .  LATHER ONTO THE SCALP, FACE, BEHIND EARS FOR 5 MINUTES EVERY OTHER DAY  THEN RINSE .  LATHER ONTO THE SCALP, FACE, BEHIND EARS FOR 5 MINUTES EVERY OTHER DAY   THEN RINSE 5/9/22  no Historical Provider, MD   KRILL OIL ORAL Take by mouth every 12 hours.   no Historical Provider, MD   levoFLOXacin (Levaquin) 750 mg tablet  9/21/22  no Historical Provider, MD   magnesium oxide (Mag-Ox) 400 mg (241.3 mg magnesium) tablet Take 1 tablet (400 mg) by mouth twice a day. 9/23/16  Yes Historical Provider, MD   multivitamin tablet Take 1 tablet by mouth once daily.   Yes Historical Provider, MD   omega 3-dha-epa-fish oil (Fish OiL) 1,000 mg (120 mg-180 mg) capsule Take by mouth.   no Historical Provider, MD   omega 3-dha-epa-fish oil (Fish OiL) 1,000 mg (120 mg-180 mg) capsule Take 1 capsule (1,000 mg) by mouth 2 times a day.   Yes Historical Provider, MD   potassium chloride CR (Klor-Con M20) 20  mEq ER tablet Take 2 tablets (40 mEq) by mouth 2 times a day. 4/11/22  Yes Historical Provider, MD   potassium citrate CR (Urocit-K-5) 5 mEq ER tablet Take 3 tablets (15 mEq) by mouth once daily at bedtime. Do not crush, chew, or split.   Yes Historical Provider, MD   prazosin (Minipress) 2 mg capsule Take 1 capsule (2 mg) by mouth once daily at bedtime. 7/18/23 10/16/23 no Casey Dick,    psyllium (Metamucil) 3.4 gram packet Take by mouth. 2/28/23  no Historical Provider, MD   psyllium husk (METAMUCIL ORAL) Take 1 Scoop by mouth once daily.   Yes Historical Provider, MD   rosuvastatin (Crestor) 20 mg tablet Take 1 tablet (20 mg) by mouth once daily at bedtime. 9/20/22  Yes Historical Provider, MD   simvastatin (Zocor) 10 mg tablet Take 1 tablet (10 mg) by mouth once daily at bedtime.   no Historical Provider, MD   spironolactone (Aldactone) 50 mg tablet Take 1 tablet (50 mg) by mouth once daily. 4/11/22  Yes Historical Provider, MD   tamsulosin (Flomax) 0.4 mg 24 hr capsule Take 2 capsules (0.8 mg) by mouth once daily at bedtime.   Yes Historical Provider, MD   topiramate (Topamax) 50 mg tablet Take 1 tablet (50 mg) by mouth 2 times a day. 10/25/23 10/24/24 Yes Vicente Gerardo MD PhD   vitamin D3-vitamin K2, MK4, 1,000-100 unit-mcg tablet Take by mouth.   no Historical Provider, MD   warfarin (Coumadin) 10 mg tablet Take 1 tablet (10 mg) by mouth once daily. 7/31/13  no Historical Provider, MD   warfarin (Coumadin) 5 mg tablet Take by mouth. Take 2.5 tabs 6 times weekly and 2 tabs once weekly on Tuesday   Yes Historical Provider, MD        The list below reflectives the updated allergy list. Please review each documented allergy for additional clarification and justification.  Allergies  Reviewed by Jo Wisdom RN on 2/20/2024        Severity Reactions Comments    Penicillins Not Specified Other Siva Wall RN Registered Nurse Signed  ED Notes Service date: 09/01/2014 2:49 PM    Unasyn test dose  given Sq per MD request w/o reaction of any kind after 20 minutes.  Medication administered as ordered.            Below are additional concerns with the patient's PTA list.      Mita Mazariegos CPhT

## 2024-02-20 NOTE — ED TRIAGE NOTES
PT. BROUGHT TO ED BY WIFE. WIFE STATES THAT SHE CAME HOME FROM STORE, FOUND PT. AT BOTTOM OF STAIRS, UNCONSCIOUS. WIFE STATES APPROX. 10 STAIRS, GOT PT. UP AND CLEANED UP PRIOR TO COMING TO ED. PT. AWAKE, ALERT IN ED, STATES DOES NOT REMEMBER THE FALL. PT. C/O PAIN ACROSS UPPER CHEST/NANI SHOULDERS. LAC NOTED TO RIGHT POSTERIOR HEAD. BRUISING NOTED TO FOREHEAD. WIFE STATES PT. ON COUMADIN.

## 2024-02-21 ENCOUNTER — APPOINTMENT (OUTPATIENT)
Dept: CARDIOLOGY | Facility: CLINIC | Age: 64
End: 2024-02-21
Payer: MEDICARE

## 2024-02-21 ENCOUNTER — APPOINTMENT (OUTPATIENT)
Dept: CARDIOLOGY | Facility: HOSPITAL | Age: 64
DRG: 312 | End: 2024-02-21
Payer: MEDICARE

## 2024-02-21 LAB
ANION GAP SERPL CALC-SCNC: 15 MMOL/L (ref 10–20)
AORTIC VALVE PEAK VELOCITY: 1.38 M/S
AV PEAK GRADIENT: 7.6 MMHG
AVA (PEAK VEL): 3.51 CM2
BASOPHILS # BLD AUTO: 0.05 X10*3/UL (ref 0–0.1)
BASOPHILS NFR BLD AUTO: 0.5 %
BUN SERPL-MCNC: 26 MG/DL (ref 6–23)
CALCIUM SERPL-MCNC: 8.9 MG/DL (ref 8.6–10.3)
CHLORIDE SERPL-SCNC: 103 MMOL/L (ref 98–107)
CO2 SERPL-SCNC: 27 MMOL/L (ref 21–32)
CREAT SERPL-MCNC: 1.13 MG/DL (ref 0.5–1.3)
EGFRCR SERPLBLD CKD-EPI 2021: 73 ML/MIN/1.73M*2
EOSINOPHIL # BLD AUTO: 0.08 X10*3/UL (ref 0–0.7)
EOSINOPHIL NFR BLD AUTO: 0.8 %
ERYTHROCYTE [DISTWIDTH] IN BLOOD BY AUTOMATED COUNT: 14.7 % (ref 11.5–14.5)
GLUCOSE BLD MANUAL STRIP-MCNC: 170 MG/DL (ref 74–99)
GLUCOSE BLD MANUAL STRIP-MCNC: 211 MG/DL (ref 74–99)
GLUCOSE BLD MANUAL STRIP-MCNC: 225 MG/DL (ref 74–99)
GLUCOSE BLD MANUAL STRIP-MCNC: 235 MG/DL (ref 74–99)
GLUCOSE BLD MANUAL STRIP-MCNC: 297 MG/DL (ref 74–99)
GLUCOSE SERPL-MCNC: 165 MG/DL (ref 74–99)
HCT VFR BLD AUTO: 39.8 % (ref 41–52)
HGB BLD-MCNC: 13.1 G/DL (ref 13.5–17.5)
IMM GRANULOCYTES # BLD AUTO: 0.06 X10*3/UL (ref 0–0.7)
IMM GRANULOCYTES NFR BLD AUTO: 0.6 % (ref 0–0.9)
INR PPP: 2 (ref 0.9–1.1)
LEFT VENTRICLE INTERNAL DIMENSION DIASTOLE: 5.91 CM (ref 3.5–6)
LEFT VENTRICULAR OUTFLOW TRACT DIAMETER: 2.5 CM
LYMPHOCYTES # BLD AUTO: 2.03 X10*3/UL (ref 1.2–4.8)
LYMPHOCYTES NFR BLD AUTO: 21.3 %
MAGNESIUM SERPL-MCNC: 1.98 MG/DL (ref 1.6–2.4)
MCH RBC QN AUTO: 32.2 PG (ref 26–34)
MCHC RBC AUTO-ENTMCNC: 32.9 G/DL (ref 32–36)
MCV RBC AUTO: 98 FL (ref 80–100)
MONOCYTES # BLD AUTO: 0.86 X10*3/UL (ref 0.1–1)
MONOCYTES NFR BLD AUTO: 9 %
NEUTROPHILS # BLD AUTO: 6.45 X10*3/UL (ref 1.2–7.7)
NEUTROPHILS NFR BLD AUTO: 67.8 %
NRBC BLD-RTO: 0 /100 WBCS (ref 0–0)
PLATELET # BLD AUTO: 186 X10*3/UL (ref 150–450)
POTASSIUM SERPL-SCNC: 4.1 MMOL/L (ref 3.5–5.3)
PROTHROMBIN TIME: 22.4 SECONDS (ref 9.8–12.8)
RBC # BLD AUTO: 4.07 X10*6/UL (ref 4.5–5.9)
SODIUM SERPL-SCNC: 141 MMOL/L (ref 136–145)
WBC # BLD AUTO: 9.5 X10*3/UL (ref 4.4–11.3)

## 2024-02-21 PROCEDURE — G0378 HOSPITAL OBSERVATION PER HR: HCPCS

## 2024-02-21 PROCEDURE — 36415 COLL VENOUS BLD VENIPUNCTURE: CPT

## 2024-02-21 PROCEDURE — 83735 ASSAY OF MAGNESIUM: CPT

## 2024-02-21 PROCEDURE — 80048 BASIC METABOLIC PNL TOTAL CA: CPT

## 2024-02-21 PROCEDURE — 85610 PROTHROMBIN TIME: CPT

## 2024-02-21 PROCEDURE — 2500000002 HC RX 250 W HCPCS SELF ADMINISTERED DRUGS (ALT 637 FOR MEDICARE OP, ALT 636 FOR OP/ED)

## 2024-02-21 PROCEDURE — 2500000005 HC RX 250 GENERAL PHARMACY W/O HCPCS

## 2024-02-21 PROCEDURE — 99204 OFFICE O/P NEW MOD 45 MIN: CPT | Performed by: NURSE PRACTITIONER

## 2024-02-21 PROCEDURE — 2500000004 HC RX 250 GENERAL PHARMACY W/ HCPCS (ALT 636 FOR OP/ED)

## 2024-02-21 PROCEDURE — 93306 TTE W/DOPPLER COMPLETE: CPT | Performed by: INTERNAL MEDICINE

## 2024-02-21 PROCEDURE — 93306 TTE W/DOPPLER COMPLETE: CPT

## 2024-02-21 PROCEDURE — 2500000001 HC RX 250 WO HCPCS SELF ADMINISTERED DRUGS (ALT 637 FOR MEDICARE OP)

## 2024-02-21 PROCEDURE — 85025 COMPLETE CBC W/AUTO DIFF WBC: CPT

## 2024-02-21 PROCEDURE — 82947 ASSAY GLUCOSE BLOOD QUANT: CPT

## 2024-02-21 PROCEDURE — 99232 SBSQ HOSP IP/OBS MODERATE 35: CPT

## 2024-02-21 RX ORDER — CYCLOBENZAPRINE HCL 10 MG
5 TABLET ORAL 3 TIMES DAILY PRN
Status: DISCONTINUED | OUTPATIENT
Start: 2024-02-21 | End: 2024-02-22

## 2024-02-21 RX ORDER — DICLOFENAC SODIUM 10 MG/G
4 GEL TOPICAL 4 TIMES DAILY PRN
Status: DISCONTINUED | OUTPATIENT
Start: 2024-02-21 | End: 2024-02-22 | Stop reason: HOSPADM

## 2024-02-21 RX ORDER — OXYCODONE AND ACETAMINOPHEN 5; 325 MG/1; MG/1
1 TABLET ORAL EVERY 6 HOURS PRN
Status: DISCONTINUED | OUTPATIENT
Start: 2024-02-21 | End: 2024-02-21

## 2024-02-21 RX ORDER — ONDANSETRON 4 MG/1
4 TABLET, ORALLY DISINTEGRATING ORAL EVERY 8 HOURS PRN
Status: DISCONTINUED | OUTPATIENT
Start: 2024-02-21 | End: 2024-02-22 | Stop reason: HOSPADM

## 2024-02-21 RX ORDER — OXYCODONE AND ACETAMINOPHEN 5; 325 MG/1; MG/1
2 TABLET ORAL EVERY 6 HOURS PRN
Status: DISCONTINUED | OUTPATIENT
Start: 2024-02-21 | End: 2024-02-22 | Stop reason: HOSPADM

## 2024-02-21 RX ORDER — ONDANSETRON HYDROCHLORIDE 2 MG/ML
4 INJECTION, SOLUTION INTRAVENOUS EVERY 8 HOURS PRN
Status: DISCONTINUED | OUTPATIENT
Start: 2024-02-21 | End: 2024-02-22 | Stop reason: HOSPADM

## 2024-02-21 RX ORDER — SENNOSIDES 8.6 MG/1
1 TABLET ORAL NIGHTLY
Status: DISCONTINUED | OUTPATIENT
Start: 2024-02-21 | End: 2024-02-22 | Stop reason: HOSPADM

## 2024-02-21 RX ADMIN — HYDROMORPHONE HYDROCHLORIDE 0.4 MG: 1 INJECTION, SOLUTION INTRAMUSCULAR; INTRAVENOUS; SUBCUTANEOUS at 02:01

## 2024-02-21 RX ADMIN — LISINOPRIL 2.5 MG: 5 TABLET ORAL at 08:47

## 2024-02-21 RX ADMIN — SENNOSIDES 8.6 MG: 8.6 TABLET, FILM COATED ORAL at 20:40

## 2024-02-21 RX ADMIN — OXYCODONE HYDROCHLORIDE AND ACETAMINOPHEN 2 TABLET: 5; 325 TABLET ORAL at 18:09

## 2024-02-21 RX ADMIN — ACETAMINOPHEN 650 MG: 325 TABLET ORAL at 02:00

## 2024-02-21 RX ADMIN — ONDANSETRON 4 MG: 4 TABLET, ORALLY DISINTEGRATING ORAL at 18:11

## 2024-02-21 RX ADMIN — HYDROMORPHONE HYDROCHLORIDE 0.4 MG: 1 INJECTION, SOLUTION INTRAMUSCULAR; INTRAVENOUS; SUBCUTANEOUS at 11:43

## 2024-02-21 RX ADMIN — DULOXETINE HYDROCHLORIDE 60 MG: 30 CAPSULE, DELAYED RELEASE ORAL at 08:48

## 2024-02-21 RX ADMIN — ONDANSETRON 4 MG: 2 INJECTION INTRAMUSCULAR; INTRAVENOUS at 09:04

## 2024-02-21 RX ADMIN — DIGOXIN 125 MCG: 125 TABLET ORAL at 08:47

## 2024-02-21 RX ADMIN — POLYETHYLENE GLYCOL 3350 17 G: 17 POWDER, FOR SOLUTION ORAL at 08:46

## 2024-02-21 RX ADMIN — INSULIN LISPRO 6 UNITS: 100 INJECTION, SOLUTION INTRAVENOUS; SUBCUTANEOUS at 12:18

## 2024-02-21 RX ADMIN — GABAPENTIN 800 MG: 400 CAPSULE ORAL at 08:48

## 2024-02-21 RX ADMIN — HYDROMORPHONE HYDROCHLORIDE 0.2 MG: 0.2 INJECTION, SOLUTION INTRAMUSCULAR; INTRAVENOUS; SUBCUTANEOUS at 18:54

## 2024-02-21 RX ADMIN — ACETAMINOPHEN 650 MG: 325 TABLET ORAL at 08:48

## 2024-02-21 RX ADMIN — DICLOFENAC SODIUM 4 G: 10 GEL TOPICAL at 16:51

## 2024-02-21 RX ADMIN — OXYCODONE HYDROCHLORIDE AND ACETAMINOPHEN 1 TABLET: 5; 325 TABLET ORAL at 16:28

## 2024-02-21 RX ADMIN — WARFARIN SODIUM 12.5 MG: 5 TABLET ORAL at 18:10

## 2024-02-21 RX ADMIN — GABAPENTIN 800 MG: 400 CAPSULE ORAL at 20:40

## 2024-02-21 RX ADMIN — OXYCODONE HYDROCHLORIDE AND ACETAMINOPHEN 1 TABLET: 5; 325 TABLET ORAL at 10:33

## 2024-02-21 RX ADMIN — CYCLOBENZAPRINE 5 MG: 10 TABLET, FILM COATED ORAL at 18:12

## 2024-02-21 RX ADMIN — ACETAMINOPHEN 650 MG: 325 TABLET ORAL at 21:37

## 2024-02-21 RX ADMIN — HYDROMORPHONE HYDROCHLORIDE 0.2 MG: 0.2 INJECTION, SOLUTION INTRAMUSCULAR; INTRAVENOUS; SUBCUTANEOUS at 05:36

## 2024-02-21 RX ADMIN — PERFLUTREN 3 ML OF DILUTION: 6.52 INJECTION, SUSPENSION INTRAVENOUS at 10:15

## 2024-02-21 RX ADMIN — ROSUVASTATIN CALCIUM 20 MG: 10 TABLET, FILM COATED ORAL at 21:00

## 2024-02-21 RX ADMIN — ACETAMINOPHEN 650 MG: 325 TABLET ORAL at 05:45

## 2024-02-21 RX ADMIN — GABAPENTIN 800 MG: 400 CAPSULE ORAL at 00:48

## 2024-02-21 RX ADMIN — ONDANSETRON 4 MG: 2 INJECTION INTRAMUSCULAR; INTRAVENOUS at 16:20

## 2024-02-21 RX ADMIN — INSULIN LISPRO 2 UNITS: 100 INJECTION, SOLUTION INTRAVENOUS; SUBCUTANEOUS at 09:03

## 2024-02-21 SDOH — SOCIAL STABILITY: SOCIAL INSECURITY: HAS ANYONE EVER THREATENED TO HURT YOUR FAMILY OR YOUR PETS?: NO

## 2024-02-21 SDOH — ECONOMIC STABILITY: INCOME INSECURITY: IN THE PAST 12 MONTHS, HAS THE ELECTRIC, GAS, OIL, OR WATER COMPANY THREATENED TO SHUT OFF SERVICE IN YOUR HOME?: NO

## 2024-02-21 SDOH — SOCIAL STABILITY: SOCIAL INSECURITY: DOES ANYONE TRY TO KEEP YOU FROM HAVING/CONTACTING OTHER FRIENDS OR DOING THINGS OUTSIDE YOUR HOME?: NO

## 2024-02-21 SDOH — SOCIAL STABILITY: SOCIAL INSECURITY: WITHIN THE LAST YEAR, HAVE YOU BEEN HUMILIATED OR EMOTIONALLY ABUSED IN OTHER WAYS BY YOUR PARTNER OR EX-PARTNER?: NO

## 2024-02-21 SDOH — HEALTH STABILITY: PHYSICAL HEALTH: ON AVERAGE, HOW MANY MINUTES DO YOU ENGAGE IN EXERCISE AT THIS LEVEL?: 0 MIN

## 2024-02-21 SDOH — SOCIAL STABILITY: SOCIAL INSECURITY
WITHIN THE LAST YEAR, HAVE YOU BEEN KICKED, HIT, SLAPPED, OR OTHERWISE PHYSICALLY HURT BY YOUR PARTNER OR EX-PARTNER?: NO

## 2024-02-21 SDOH — SOCIAL STABILITY: SOCIAL INSECURITY: HAVE YOU HAD THOUGHTS OF HARMING ANYONE ELSE?: NO

## 2024-02-21 SDOH — SOCIAL STABILITY: SOCIAL INSECURITY: WITHIN THE LAST YEAR, HAVE YOU BEEN AFRAID OF YOUR PARTNER OR EX-PARTNER?: NO

## 2024-02-21 SDOH — HEALTH STABILITY: MENTAL HEALTH
STRESS IS WHEN SOMEONE FEELS TENSE, NERVOUS, ANXIOUS, OR CAN'T SLEEP AT NIGHT BECAUSE THEIR MIND IS TROUBLED. HOW STRESSED ARE YOU?: NOT AT ALL

## 2024-02-21 SDOH — SOCIAL STABILITY: SOCIAL NETWORK: HOW OFTEN DO YOU ATTENT MEETINGS OF THE CLUB OR ORGANIZATION YOU BELONG TO?: NEVER

## 2024-02-21 SDOH — ECONOMIC STABILITY: FOOD INSECURITY: WITHIN THE PAST 12 MONTHS, YOU WORRIED THAT YOUR FOOD WOULD RUN OUT BEFORE YOU GOT MONEY TO BUY MORE.: NEVER TRUE

## 2024-02-21 SDOH — SOCIAL STABILITY: SOCIAL NETWORK: IN A TYPICAL WEEK, HOW MANY TIMES DO YOU TALK ON THE PHONE WITH FAMILY, FRIENDS, OR NEIGHBORS?: THREE TIMES A WEEK

## 2024-02-21 SDOH — SOCIAL STABILITY: SOCIAL NETWORK
DO YOU BELONG TO ANY CLUBS OR ORGANIZATIONS SUCH AS CHURCH GROUPS UNIONS, FRATERNAL OR ATHLETIC GROUPS, OR SCHOOL GROUPS?: NO

## 2024-02-21 SDOH — SOCIAL STABILITY: SOCIAL INSECURITY: ARE THERE ANY APPARENT SIGNS OF INJURIES/BEHAVIORS THAT COULD BE RELATED TO ABUSE/NEGLECT?: NO

## 2024-02-21 SDOH — SOCIAL STABILITY: SOCIAL NETWORK: ARE YOU MARRIED, WIDOWED, DIVORCED, SEPARATED, NEVER MARRIED, OR LIVING WITH A PARTNER?: MARRIED

## 2024-02-21 SDOH — SOCIAL STABILITY: SOCIAL INSECURITY
WITHIN THE LAST YEAR, HAVE TO BEEN RAPED OR FORCED TO HAVE ANY KIND OF SEXUAL ACTIVITY BY YOUR PARTNER OR EX-PARTNER?: NO

## 2024-02-21 SDOH — SOCIAL STABILITY: SOCIAL INSECURITY: DO YOU FEEL UNSAFE GOING BACK TO THE PLACE WHERE YOU ARE LIVING?: NO

## 2024-02-21 SDOH — SOCIAL STABILITY: SOCIAL NETWORK: HOW OFTEN DO YOU GET TOGETHER WITH FRIENDS OR RELATIVES?: THREE TIMES A WEEK

## 2024-02-21 SDOH — SOCIAL STABILITY: SOCIAL INSECURITY: DO YOU FEEL ANYONE HAS EXPLOITED OR TAKEN ADVANTAGE OF YOU FINANCIALLY OR OF YOUR PERSONAL PROPERTY?: NO

## 2024-02-21 SDOH — ECONOMIC STABILITY: FOOD INSECURITY: WITHIN THE PAST 12 MONTHS, THE FOOD YOU BOUGHT JUST DIDN'T LAST AND YOU DIDN'T HAVE MONEY TO GET MORE.: NEVER TRUE

## 2024-02-21 SDOH — SOCIAL STABILITY: SOCIAL INSECURITY: ARE YOU OR HAVE YOU BEEN THREATENED OR ABUSED PHYSICALLY, EMOTIONALLY, OR SEXUALLY BY ANYONE?: NO

## 2024-02-21 SDOH — SOCIAL STABILITY: SOCIAL INSECURITY: ABUSE: ADULT

## 2024-02-21 SDOH — SOCIAL STABILITY: SOCIAL NETWORK: HOW OFTEN DO YOU ATTEND CHURCH OR RELIGIOUS SERVICES?: NEVER

## 2024-02-21 SDOH — HEALTH STABILITY: PHYSICAL HEALTH: ON AVERAGE, HOW MANY DAYS PER WEEK DO YOU ENGAGE IN MODERATE TO STRENUOUS EXERCISE (LIKE A BRISK WALK)?: 0 DAYS

## 2024-02-21 ASSESSMENT — COGNITIVE AND FUNCTIONAL STATUS - GENERAL
MOBILITY SCORE: 22
PATIENT BASELINE BEDBOUND: NO
CLIMB 3 TO 5 STEPS WITH RAILING: A LITTLE
DAILY ACTIVITIY SCORE: 24
WALKING IN HOSPITAL ROOM: A LITTLE

## 2024-02-21 ASSESSMENT — ACTIVITIES OF DAILY LIVING (ADL)
PATIENT'S MEMORY ADEQUATE TO SAFELY COMPLETE DAILY ACTIVITIES?: YES
TOILETING: INDEPENDENT
ADEQUATE_TO_COMPLETE_ADL: YES
HEARING - RIGHT EAR: FUNCTIONAL
HEARING - LEFT EAR: FUNCTIONAL
WALKS IN HOME: INDEPENDENT
FEEDING YOURSELF: INDEPENDENT
BATHING: INDEPENDENT
GROOMING: INDEPENDENT
DRESSING YOURSELF: INDEPENDENT
JUDGMENT_ADEQUATE_SAFELY_COMPLETE_DAILY_ACTIVITIES: YES
LACK_OF_TRANSPORTATION: NO

## 2024-02-21 ASSESSMENT — PAIN SCALES - GENERAL
PAINLEVEL_OUTOF10: 10 - WORST POSSIBLE PAIN
PAINLEVEL_OUTOF10: 10 - WORST POSSIBLE PAIN
PAINLEVEL_OUTOF10: 7
PAINLEVEL_OUTOF10: 1
PAINLEVEL_OUTOF10: 8
PAINLEVEL_OUTOF10: 1
PAINLEVEL_OUTOF10: 1
PAINLEVEL_OUTOF10: 4
PAINLEVEL_OUTOF10: 4
PAINLEVEL_OUTOF10: 8
PAINLEVEL_OUTOF10: 1
PAINLEVEL_OUTOF10: 9
PAINLEVEL_OUTOF10: 6
PAINLEVEL_OUTOF10: 10 - WORST POSSIBLE PAIN
PAINLEVEL_OUTOF10: 1

## 2024-02-21 ASSESSMENT — LIFESTYLE VARIABLES
HOW MANY STANDARD DRINKS CONTAINING ALCOHOL DO YOU HAVE ON A TYPICAL DAY: PATIENT DOES NOT DRINK
PRESCIPTION_ABUSE_PAST_12_MONTHS: NO
HOW OFTEN DO YOU HAVE A DRINK CONTAINING ALCOHOL: NEVER
AUDIT-C TOTAL SCORE: 0
SKIP TO QUESTIONS 9-10: 1
AUDIT-C TOTAL SCORE: 0
HOW OFTEN DO YOU HAVE 6 OR MORE DRINKS ON ONE OCCASION: NEVER
SUBSTANCE_ABUSE_PAST_12_MONTHS: YES

## 2024-02-21 ASSESSMENT — PATIENT HEALTH QUESTIONNAIRE - PHQ9
2. FEELING DOWN, DEPRESSED OR HOPELESS: NOT AT ALL
SUM OF ALL RESPONSES TO PHQ9 QUESTIONS 1 & 2: 0
1. LITTLE INTEREST OR PLEASURE IN DOING THINGS: NOT AT ALL

## 2024-02-21 ASSESSMENT — PAIN DESCRIPTION - LOCATION
LOCATION: HEAD
LOCATION: FACE

## 2024-02-21 ASSESSMENT — COLUMBIA-SUICIDE SEVERITY RATING SCALE - C-SSRS
6. HAVE YOU EVER DONE ANYTHING, STARTED TO DO ANYTHING, OR PREPARED TO DO ANYTHING TO END YOUR LIFE?: NO
1. IN THE PAST MONTH, HAVE YOU WISHED YOU WERE DEAD OR WISHED YOU COULD GO TO SLEEP AND NOT WAKE UP?: NO
2. HAVE YOU ACTUALLY HAD ANY THOUGHTS OF KILLING YOURSELF?: NO

## 2024-02-21 NOTE — PROGRESS NOTES
02/21/24 1402   Discharge Planning   Living Arrangements Spouse/significant other   Support Systems Spouse/significant other   Type of Residence Private residence   Who is requesting discharge planning? Provider   Home or Post Acute Services None   Financial Resource Strain   How hard is it for you to pay for the very basics like food, housing, medical care, and heating? Not very     Met with pt and his wife, pt admit for syncope.  Pt fell down 10 steps and was unaware .  Cardiac and neurosurgery on consulted.  Pt up in room independently, pt denies any needs.   Home address and insurance verified.  Will follow if needs arise.   Rose Marie Kevin RN [facility]

## 2024-02-21 NOTE — CARE PLAN
The patient's goals for the shift include pain management    The clinical goals for the shift include pain control      Problem: Diabetes  Goal: Achieve decreasing blood glucose levels by end of shift  Outcome: Progressing  Goal: Increase stability of blood glucose readings by end of shift  Outcome: Progressing  Goal: Decrease in ketones present in urine by end of shift  Outcome: Progressing  Goal: Maintain electrolyte levels within acceptable range throughout shift  Outcome: Progressing  Goal: Maintain glucose levels >70mg/dl to <250mg/dl throughout shift  Outcome: Progressing  Goal: No changes in neurological exam by end of shift  Outcome: Progressing  Goal: Learn about and adhere to nutrition recommendations by end of shift  Outcome: Progressing  Goal: Vital signs within normal range for age by end of shift  Outcome: Progressing  Goal: Increase self care and/or family involovement by end of shift  Outcome: Progressing  Goal: Receive DSME education by end of shift  Outcome: Progressing     Problem: Pain - Adult  Goal: Verbalizes/displays adequate comfort level or baseline comfort level  Outcome: Progressing     Problem: Safety - Adult  Goal: Free from fall injury  Outcome: Progressing     Problem: Discharge Planning  Goal: Discharge to home or other facility with appropriate resources  Outcome: Progressing     Problem: Chronic Conditions and Co-morbidities  Goal: Patient's chronic conditions and co-morbidity symptoms are monitored and maintained or improved  Outcome: Progressing     Problem: Pain  Goal: Takes deep breaths with improved pain control throughout the shift  Outcome: Progressing  Goal: Turns in bed with improved pain control throughout the shift  Outcome: Progressing  Goal: Walks with improved pain control throughout the shift  Outcome: Progressing  Goal: Performs ADL's with improved pain control throughout shift  Outcome: Progressing  Goal: Participates in PT with improved pain control throughout the  shift  Outcome: Progressing  Goal: Free from opioid side effects throughout the shift  Outcome: Progressing  Goal: Free from acute confusion related to pain meds throughout the shift  Outcome: Progressing

## 2024-02-21 NOTE — NURSING NOTE
Patient arrived from ED cooperative. A&Ox4 no tres deficits noted. C/O pain analgesic administered as ordered. Pleasant conversation, he is medication and car plan compliant. Safety and rounding ongoing.

## 2024-02-21 NOTE — H&P
"SUBJECTIVE     HPI:  Nasir Sandoval is a 63 y.o. year old male with a history of HFpEF, HTN, HLD, type II DM c/b neuropathy, COPD (no home O2 demand), BPH, gout, PTSD who [facility] after sustaining fall secondary to syncope.  Patient's wife is at bedside and confirms story/medical history.  Wife returned home from grocery store to find patient at the bottom of the stairs.  He states that the last thing that he remembers he was standing at the top of the stairs feeling completely normal.  After coming to at the bottom of the stairs he noticed that he was bleeding from his head and from his mouth due to a tongue bite.  States that he was very disoriented and unaware where he was after coming to; he believes he passed out again citing that when he came to the second time he was more oriented to his surroundings and found his wife standing over him.  Previously to the event he denies any prodromal symptoms, diaphoresis, lightheaded/dizziness, tunnel vision, previous exertion.  He states that he has never had a syncopal event before.  His baseline ambulatory function is completely normal.  Is on anticoagulation, Coumadin, for A-fib and is compliant with all other medications.  He further denies NVD, SOB, chest pain, cough, vision/hearing changes, urinary/stool issues.  Claims that he stays very hydrated on water and tea.  He admits to taking \"weed Gummies\" in the morning and at night for PTSD and generalized pain.  Wife does mention that they recently started lisinopril 2 weeks ago.  They routinely check blood pressures at home and note average -1 20 and DBP around 60.  Additionally patient complains of severe pain in his cervical neck and back and says he feels sore in a \"shoulder pad\" like distribution across his chest and shoulders which is not present at baseline.    A 10 point ROS was performed with the patient denying any complaint at this time aside from those listed in the HPI above.     In " the ED: Afebrile, 88 HR, 20 RR, 186/101, 95% room air.  Over the next 3 hours patient was intermittently tachycardic and was hypertensive up to 214/89 (believed to be physiologic from his pain).  CMP unremarkable.  INR 1.9, PT 21.1.  CBC showing normal WBC 8.3 with hemoglobin 13.2.  UA negative.  UDS positive for cannabinoid.  CT head unremarkable.  CT spine showing incidental findings of severe spinal stenosis with multilevel DDD at C4-6.  Cardiology consulted for syncope of unknown etiology and neurosurgery consulted for spinal stenosis evaluation.     PMH: as above  PSH: as above  SH: denies smoking, denies EtOH, THC Gummies for PTSD/pain    OBJECTIVE     Vitals:    02/20/24 1816 02/20/24 1831 02/20/24 1900 02/20/24 1959   BP: (!) 214/89 172/79 144/63 (!) 179/94   BP Location:       Patient Position:       Pulse: (!) 104 93 (!) 116 (!) 118   Resp:    20   Temp:       TempSrc:       SpO2: 95% 94% 95% 94%   Weight:       Height:          Results from last 7 days   Lab Units 02/20/24  1642   WBC AUTO x10*3/uL 8.3   HEMOGLOBIN g/dL 13.2*   HEMATOCRIT % 40.4*   PLATELETS AUTO x10*3/uL 171   NEUTROS PCT AUTO % 68.7   LYMPHS PCT AUTO % 18.8   MONOS PCT AUTO % 6.9   EOS PCT AUTO % 3.9     Results from last 7 days   Lab Units 02/20/24  1642   SODIUM mmol/L 139   POTASSIUM mmol/L 3.8   CHLORIDE mmol/L 102   CO2 mmol/L 26   BUN mg/dL 27*   CREATININE mg/dL 1.16   CALCIUM mg/dL 9.5   PROTEIN TOTAL g/dL 7.7   BILIRUBIN TOTAL mg/dL 0.3   ALK PHOS U/L 69   ALT U/L 34   AST U/L 30   GLUCOSE mg/dL 155*       Scheduled Medications  [START ON 2/21/2024] insulin lispro, 0-10 Units, subcutaneous, TID with meals  lactated Ringer's, 1,000 mL, intravenous, Once  [START ON 2/21/2024] lidocaine, 1 patch, transdermal, Daily  [START ON 2/21/2024] polyethylene glycol, 17 g, oral, Daily       Physical Exam    Constitutional: Morbid obesity, A&Ox3, severe pain with movement and neck/back, alert and cooperative  Eyes: Right eye surgically  removed, EOMI, clear sclera  Respiratory/Thorax: Distant breath sounds, CTAB  Cardiovascular: Distant cardiac sounds, RRR, no murmurs, 2+ equal pulses of the extremities  Gastrointestinal: Obese habitus, soft, non-tender  Extremities: normal extremities, no cyanosis edema, no clubbing  Psychological: Appropriate mood and behavior  Skin: Warm and dry    ASSESSMENT & PLAN     Syncope with sustained fall, possibly medication induced hypotension  Severe cervical spinal stenosis found on CT imaging  -Unlikely vasovagal, situational, orthostatic syncope.  Unlikely seizure related; tongue biting related to fall and disorientation likely from head trauma/concussed state.  No obvious sources of infection at this time  -Imaging unremarkable for acute intracranial abnormalities  -Patient recently started lisinopril 2 weeks ago with occasionally reported soft BPs at home.  Conflicting charting noted in the EMR could be leading to possible polypharmacy in relation to patient's hypertension treatment  PLAN:  -Ordered echocardiogram  -Orthostatic vitals ordered  -Repeat EKG, continue to monitor on telemetry  -Pain Regimen: Lidocaine patch, scheduled tylenol, 0.2/0.4 Dilaudid q6  -Consulted cardiology for syncope of unknown etiology  -Consulted neurosurgery; he does not appear spinal stenosis has ever been worked up  -PT/OT to evaluate    Atrial fibrillation (on warfarin)  HFpEF  HTN, HLD  -Recently started taking lisinopril 2 weeks ago for proteinuria  PLAN:  -Repeat INR in the morning; goal INR: 2-3  -Continue home warfarin 12.5mg everyday except Tuesday (take 10mg)  -Continue home digoxin 125 mcg  -Continue home lisinopril 2.5 mg  -Holding home Lasix, spironolactone, tamsulosin  -Continue home rosuvastatin 20 mg    Type II DM  Diabetic neuropathy  -Home regimen: Glipizide 10 mg  PLAN:  -Carb controlled diet and moderate SSI  -Continue home gabapentin 800 mg 3 times daily  -Continue home duloxetine 60 mg    Diet: Carb controlled,  cardiac  DVT ppx: Home warfarin  IVF: --  Consults: Cardiology, neurosurgery  Dispo: PT/OT to evaluate    Brady Yadebbie, DO  PGY-1, Internal Medicine  Please SecureChat for any further questions  This is a preliminary note, please await attending attestation for final A/P

## 2024-02-21 NOTE — CONSULTS
Reason For Consult  Severe cervical spinal stenosis with DDD. Significant pain complaint. Has never been worked up before per review of EMR    History Of Present Illness  Nasir Sandoval is a 63 y.o. male presenting to [facility] ED from home by his wife, after she found him at the bottom of 10 stairs, unconscious. He did awaken and was able to clean himself up prior to arrival at ED. He reported bilateral shoulder and neck pain since he fell (he has no recollection of falling). CT imaging in ED reviewed and demonstrates posterior C4 - 5 disc osteophyte complex with canal narrowing, along with additional disc osteophyte complexes at C5 - 6, C6 - 7. He denies change in bowel / bladder function, imbalance, dropping items, difficulty opening previously opened jars / doors, difficulty dressing. He reports neck pain at this time, worsened with movement of cervical spine. Denies pain / paresthesia in extremities. Of note, he is on warfarin for Afib     Past Medical History  He has a past medical history of VALERIANO (acute kidney injury) (CMS/[facility]) (05/15/2012), Avulsion of left eye, initial encounter (11/30/2018), Avulsion of left eye, initial encounter (11/30/2018), Carcinoma in situ of prostate (10/31/2023), Epidermal cyst (10/31/2023), Esophageal dysphagia (10/31/2023), Heart failure (CMS/[facility]) (10/31/2023), Heart failure, unspecified (CMS/[facility]) (10/31/2023), Hypotension (03/03/2023), Ingrowing toenail (10/31/2023), Nephrolithiasis (05/15/2012), Pain, unspecified (10/31/2023), Recurrent major depression (CMS/[facility]) (10/31/2023), Uric acid urolithiasis (10/31/2023), Vitreous degeneration, left eye (11/30/2018), and Vitreous degeneration, left eye (11/30/2018).    Surgical History  He has no past surgical history on file.     Social History  He reports that he quit smoking about 44 years ago. His smoking use included cigarettes. He has never used smokeless tobacco. He reports that he does not currently use alcohol. He reports that he  "does not use drugs. Lives at home with his wife and their ~125# dog    Family History  No family history on file.     Allergies  Penicillins    Review of Systems  ROS x 10 is, otherwise negative unless documented above in HPI     Physical Exam  Morbidly obese male, sitting on side of bed  A&O x 4, speech clear / fluent. RIGHT EYE injury (baseline - while in [employer] per patient)  Skin is warm / dry, no obvious lesions on exposed skin   Thorax is midline; chest expansion is symmetric  DELUNA readily, strength is 5 / 5  Abdomen is not distended  CERVICAL SPINE: limited ROM, painful AROM, worse with extension, lateral rotation. Minimal lateral flexion  SENSORY: SILT in BUE  DTR: 2+ BUE  HOUSTON'S: absent  Urine - voiding QS     Last Recorded Vitals  Blood pressure 108/58, pulse 80, temperature 36.7 °C (98.1 °F), temperature source Temporal, resp. rate 18, height 1.88 m (6' 2\"), weight (!) 159 kg (350 lb), SpO2 93 %.    Relevant Results  Results for orders placed or performed during the hospital encounter of 02/20/24 (from the past 24 hour(s))   CBC and Auto Differential   Result Value Ref Range    WBC 8.3 4.4 - 11.3 x10*3/uL    nRBC 0.0 0.0 - 0.0 /100 WBCs    RBC 4.12 (L) 4.50 - 5.90 x10*6/uL    Hemoglobin 13.2 (L) 13.5 - 17.5 g/dL    Hematocrit 40.4 (L) 41.0 - 52.0 %    MCV 98 80 - 100 fL    MCH 32.0 26.0 - 34.0 pg    MCHC 32.7 32.0 - 36.0 g/dL    RDW 14.7 (H) 11.5 - 14.5 %    Platelets 171 150 - 450 x10*3/uL    Neutrophils % 68.7 40.0 - 80.0 %    Immature Granulocytes %, Automated 1.0 (H) 0.0 - 0.9 %    Lymphocytes % 18.8 13.0 - 44.0 %    Monocytes % 6.9 2.0 - 10.0 %    Eosinophils % 3.9 0.0 - 6.0 %    Basophils % 0.7 0.0 - 2.0 %    Neutrophils Absolute 5.72 1.20 - 7.70 x10*3/uL    Immature Granulocytes Absolute, Automated 0.08 0.00 - 0.70 x10*3/uL    Lymphocytes Absolute 1.56 1.20 - 4.80 x10*3/uL    Monocytes Absolute 0.57 0.10 - 1.00 x10*3/uL    Eosinophils Absolute 0.32 0.00 - 0.70 x10*3/uL    Basophils Absolute 0.06 " 0.00 - 0.10 x10*3/uL   Comprehensive Metabolic Panel   Result Value Ref Range    Glucose 155 (H) 74 - 99 mg/dL    Sodium 139 136 - 145 mmol/L    Potassium 3.8 3.5 - 5.3 mmol/L    Chloride 102 98 - 107 mmol/L    Bicarbonate 26 21 - 32 mmol/L    Anion Gap 15 10 - 20 mmol/L    Urea Nitrogen 27 (H) 6 - 23 mg/dL    Creatinine 1.16 0.50 - 1.30 mg/dL    eGFR 71 >60 mL/min/1.73m*2    Calcium 9.5 8.6 - 10.3 mg/dL    Albumin 4.2 3.4 - 5.0 g/dL    Alkaline Phosphatase 69 33 - 136 U/L    Total Protein 7.7 6.4 - 8.2 g/dL    AST 30 9 - 39 U/L    Bilirubin, Total 0.3 0.0 - 1.2 mg/dL    ALT 34 10 - 52 U/L   Lactate   Result Value Ref Range    Lactate 2.7 (H) 0.4 - 2.0 mmol/L   Protime-INR   Result Value Ref Range    Protime 21.1 (H) 9.8 - 12.8 seconds    INR 1.9 (H) 0.9 - 1.1   APTT   Result Value Ref Range    aPTT 36 27 - 38 seconds   Troponin I, High Sensitivity   Result Value Ref Range    Troponin I, High Sensitivity 13 0 - 20 ng/L   Creatine Kinase   Result Value Ref Range    Creatine Kinase 332 (H) 0 - 325 U/L   Lipase   Result Value Ref Range    Lipase 29 9 - 82 U/L   Sars-CoV-2 and Influenza A/B PCR   Result Value Ref Range    Flu A Result Not Detected Not Detected    Flu B Result Not Detected Not Detected    Coronavirus 2019, PCR Not Detected Not Detected   RSV PCR   Result Value Ref Range    RSV PCR Not Detected Not Detected   Urinalysis with Reflex Microscopic   Result Value Ref Range    Color, Urine Yellow Straw, Yellow    Appearance, Urine Clear Clear    Specific Gravity, Urine 1.014 1.005 - 1.035    pH, Urine 6.0 5.0, 5.5, 6.0, 6.5, 7.0, 7.5, 8.0    Protein, Urine NEGATIVE NEGATIVE mg/dL    Glucose, Urine NEGATIVE NEGATIVE mg/dL    Blood, Urine NEGATIVE NEGATIVE    Ketones, Urine NEGATIVE NEGATIVE mg/dL    Bilirubin, Urine NEGATIVE NEGATIVE    Urobilinogen, Urine <2.0 <2.0 mg/dL    Nitrite, Urine NEGATIVE NEGATIVE    Leukocyte Esterase, Urine NEGATIVE NEGATIVE   Lactate   Result Value Ref Range    Lactate 2.8 (H) 0.4 -  2.0 mmol/L   Drug Screen, Urine   Result Value Ref Range    Amphetamine Screen, Urine Presumptive Negative Presumptive Negative    Barbiturate Screen, Urine Presumptive Negative Presumptive Negative    Benzodiazepines Screen, Urine Presumptive Negative Presumptive Negative    Cannabinoid Screen, Urine Presumptive Positive (A) Presumptive Negative    Cocaine Metabolite Screen, Urine Presumptive Negative Presumptive Negative    Fentanyl Screen, Urine Presumptive Negative Presumptive Negative    Opiate Screen, Urine Presumptive Negative Presumptive Negative    Oxycodone Screen, Urine Presumptive Negative Presumptive Negative    PCP Screen, Urine Presumptive Negative Presumptive Negative   POCT GLUCOSE   Result Value Ref Range    POCT Glucose 150 (H) 74 - 99 mg/dL          Assessment/Plan   62 yo male s/p syncopal fall down 10 steps. No cervical fractures, no neuro deficits, no signs / symptoms / exam findings of myelopathy. Neck pain with ROM.  Discussed with Dr. Nancy Mcdonald:  - Continue medical management.  - Given no fracture, no neuro deficits, there is no need for MRI C Spine  - Can follow up with our office only as needed.    I spent >35 minutes in the professional and overall care of this patient.      Shayla Daley, APRN-CNP

## 2024-02-21 NOTE — PROGRESS NOTES
Nasir Sandoval is a 63 y.o. male on day 0 of admission presenting with Syncope and collapse.      Subjective   Pt seen at bedside. Endorsed brief dizziness before falling but denied vision changes or other prodromal symptoms. Reports pain and states his pain relief is not lasting long enough before his next dose is due. Willing to try PO medications for pain control.       Objective     Last Recorded Vitals  BP (!) 155/93 (BP Location: Right arm, Patient Position: Sitting)   Pulse 98   Temp 35.6 °C (96.1 °F) (Temporal)   Resp 18   Wt (!) 159 kg (350 lb)   SpO2 92%   Intake/Output last 3 Shifts:  No intake or output data in the 24 hours ending 02/21/24 1251    Admission Weight  Weight: (!) 159 kg (350 lb) (02/20/24 1616)    Daily Weight  02/20/24 : (!) 159 kg (350 lb)    Image Results  Transthoracic Echo (TTE) [facility], [address] [place], [place] [address]Tel [phone] and                                  Fax [phone]    TRANSTHORACIC ECHOCARDIOGRAM REPORT       Patient Name:      NASIR SANDOVAL        Reading Physician:    [id number] Kevin Rudd MD  Study Date:        2/21/2024            Ordering Provider:    [id number] SVETLANA PENN  MRN/PID:           [id number]             Fellow:  Accession#:        [id number]         Nurse:                Vannessa Spears RN  Date of Birth/Age: 1960 / 63 years Sonographer:          Mo Yanez RDCS  Gender:            M                    Additional Staff:  Height:            187.96 cm            Admit Date:           2/20/2024  Weight:            158.76 kg            Admission Status:     Inpatient -                                                                Routine  BSA / BMI:         2.76 m2 / 44.94      Encounter#:           [id number]                     kg/m2                                          Department  Location:  [facility]  Blood Pressure: 155 /93 mmHg    Study Type:    TRANSTHORACIC ECHO (TTE) COMPLETE  Diagnosis/ICD: Syncope and collapse-R55  CPT Code:      Echo Complete w Full Doppler-15906    Patient History:  BMI:               Obese >30  Pertinent History: Syncope, CHF, A-Fib, CAD, Chest Pain, HTN, Hyperlipidemia and                     Dyspnea. MARKIE, ETOH abuse.    Study Detail: The following Echo studies were performed: 2D, M-Mode, Doppler and                color flow. Definity used as a contrast agent for endocardial                border definition. Total contrast used for this procedure was 3 mL                via IV push.       PHYSICIAN INTERPRETATION:  Left Ventricle: The left ventricular systolic function is mildly decreased, with an estimated ejection fraction of 45%. The patient is in atrial fibrillation which may influence the estimate of left ventricular function and transvalvular flows. There are no regional wall motion abnormalities. The left ventricular cavity size is normal. There is moderate concentric left ventricular hypertrophy. Left ventricular diastolic filling was indeterminate.  Left Atrium: The left atrium is moderately dilated.  Right Ventricle: The right ventricle was not well visualized. Unable to determine right ventricular systolic function.  Right Atrium: The right atrium is normal in size.  Aortic Valve: The aortic valve was not well visualized. There is no evidence of aortic valve regurgitation. The peak instantaneous gradient of the aortic valve is 7.6 mmHg.  Mitral Valve: The mitral valve is normal in structure. There is trace mitral valve regurgitation.  Tricuspid Valve: The tricuspid valve is structurally normal. No evidence of tricuspid regurgitation.  Pulmonic Valve: The pulmonic valve is not well visualized. There is no indication of pulmonic valve regurgitation.  Pericardium: There is no pericardial effusion noted.  Aorta: The aortic root is abnormal. The  thoracic aorta is mildly to moderately dilated measuring 4.4 cm.       CONCLUSIONS:   1. Left ventricular systolic function is mildly decreased with a 45% estimated ejection fraction.   2. There is moderate concentric left ventricular hypertrophy.   3. The left atrium is moderately dilated.   4. The thoracic aorta is mildly to moderately dilated measuring 4.4 cm.   5. The patient is in atrial fibrillation which may influence the estimate of left ventricular function and transvalvular flows.    QUANTITATIVE DATA SUMMARY:  2D MEASUREMENTS:                            Normal Ranges:  LAs:           4.90 cm    (2.7-4.0cm)  IVSd:          1.43 cm    (0.6-1.1cm)  LVPWd:         1.68 cm    (0.6-1.1cm)  LVIDd:         5.91 cm    (3.9-5.9cm)  LVIDs:         3.77 cm  LV Mass Index: 159.4 g/m2  LV % FS        36.2 %    LA VOLUME:                              Normal Ranges:  LA Volume Index: 31.0 ml/m2    M-MODE MEASUREMENTS:                   Normal Ranges:  Ao Root: 4.50 cm (2.0-3.7cm)  LAs:     5.30 cm (2.7-4.0cm)    AORTA MEASUREMENTS:                     Normal Ranges:  Asc Ao, d: 4.40 cm (2.1-3.4cm)    LV DIASTOLIC FUNCTION:                      Normal Ranges:  MV Peak E: 1.51 m/s (0.7-1.2 m/s)    MITRAL VALVE:                  Normal Ranges:  MV DT: 180 msec (150-240msec)    AORTIC VALVE:                          Normal Ranges:  AoV Vmax:      1.38 m/s (<=1.7m/s)  AoV Peak P.6 mmHg (<20mmHg)  LVOT Max Christian:  0.99 m/s (<=1.1m/s)  LVOT VTI:      17.70 cm  LVOT Diameter: 2.50 cm  (1.8-2.4cm)  AoV Area,Vmax: 3.51 cm2 (2.5-4.5cm2)    PULMONIC VALVE:                       Normal Ranges:  PV Max Christian: 0.9 m/s  (0.6-0.9m/s)  PV Max PG:  3.6 mmHg       [id number] Kevin Rudd MD  Electronically signed on 2024 at 11:00:15 AM       ** Final **  ECG 12 lead  Atrial fibrillation  Left anterior fascicular block  Cannot rule out Inferior infarct , age undetermined  Abnormal ECG  When compared with ECG of 12-SEP-2021 10:13,  PREVIOUS  ECG IS PRESENT      Physical Exam    Constitutional: Morbid obesity, A&Ox3, severe pain with movement and neck/back, alert and cooperative  Eyes: Right eye surgically removed, EOMI, clear sclera  Respiratory/Thorax: Distant breath sounds, bilateral wheezes  Cardiovascular: Distant cardiac sounds, RRR, no murmurs, 2+ equal pulses of the extremities  Gastrointestinal: Obese habitus, soft, non-tender  Extremities: no cyanosis edema, no clubbing  Psychological: Appropriate mood and behavior  Skin: Warm and dry, small hematoma of the left flank    Relevant Results      Scheduled medications  acetaminophen, 650 mg, oral, q4h  digoxin, 125 mcg, oral, Daily  DULoxetine, 60 mg, oral, Daily  gabapentin, 800 mg, oral, TID  insulin lispro, 0-10 Units, subcutaneous, TID with meals  lidocaine, 1 patch, transdermal, Daily  lisinopril, 2.5 mg, oral, Daily  polyethylene glycol, 17 g, oral, Daily  rosuvastatin, 20 mg, oral, Nightly  sennosides, 1 tablet, oral, Nightly  [START ON 2/27/2024] warfarin, 10 mg, oral, Once per day on Tue  warfarin, 12.5 mg, oral, Once per day on Sun Mon Wed Thu Fri Sat      Continuous medications     PRN medications  PRN medications: acetaminophen **OR** acetaminophen **OR** acetaminophen, dextrose 10 % in water (D10W), dextrose, diclofenac sodium, glucagon, HYDROmorphone, metoclopramide **OR** metoclopramide, ondansetron **OR** ondansetron, oxyCODONE-acetaminophen    Results for orders placed or performed during the hospital encounter of 02/20/24 (from the past 24 hour(s))   CBC and Auto Differential   Result Value Ref Range    WBC 8.3 4.4 - 11.3 x10*3/uL    nRBC 0.0 0.0 - 0.0 /100 WBCs    RBC 4.12 (L) 4.50 - 5.90 x10*6/uL    Hemoglobin 13.2 (L) 13.5 - 17.5 g/dL    Hematocrit 40.4 (L) 41.0 - 52.0 %    MCV 98 80 - 100 fL    MCH 32.0 26.0 - 34.0 pg    MCHC 32.7 32.0 - 36.0 g/dL    RDW 14.7 (H) 11.5 - 14.5 %    Platelets 171 150 - 450 x10*3/uL    Neutrophils % 68.7 40.0 - 80.0 %    Immature Granulocytes %,  Automated 1.0 (H) 0.0 - 0.9 %    Lymphocytes % 18.8 13.0 - 44.0 %    Monocytes % 6.9 2.0 - 10.0 %    Eosinophils % 3.9 0.0 - 6.0 %    Basophils % 0.7 0.0 - 2.0 %    Neutrophils Absolute 5.72 1.20 - 7.70 x10*3/uL    Immature Granulocytes Absolute, Automated 0.08 0.00 - 0.70 x10*3/uL    Lymphocytes Absolute 1.56 1.20 - 4.80 x10*3/uL    Monocytes Absolute 0.57 0.10 - 1.00 x10*3/uL    Eosinophils Absolute 0.32 0.00 - 0.70 x10*3/uL    Basophils Absolute 0.06 0.00 - 0.10 x10*3/uL   Comprehensive Metabolic Panel   Result Value Ref Range    Glucose 155 (H) 74 - 99 mg/dL    Sodium 139 136 - 145 mmol/L    Potassium 3.8 3.5 - 5.3 mmol/L    Chloride 102 98 - 107 mmol/L    Bicarbonate 26 21 - 32 mmol/L    Anion Gap 15 10 - 20 mmol/L    Urea Nitrogen 27 (H) 6 - 23 mg/dL    Creatinine 1.16 0.50 - 1.30 mg/dL    eGFR 71 >60 mL/min/1.73m*2    Calcium 9.5 8.6 - 10.3 mg/dL    Albumin 4.2 3.4 - 5.0 g/dL    Alkaline Phosphatase 69 33 - 136 U/L    Total Protein 7.7 6.4 - 8.2 g/dL    AST 30 9 - 39 U/L    Bilirubin, Total 0.3 0.0 - 1.2 mg/dL    ALT 34 10 - 52 U/L   Lactate   Result Value Ref Range    Lactate 2.7 (H) 0.4 - 2.0 mmol/L   Protime-INR   Result Value Ref Range    Protime 21.1 (H) 9.8 - 12.8 seconds    INR 1.9 (H) 0.9 - 1.1   APTT   Result Value Ref Range    aPTT 36 27 - 38 seconds   Troponin I, High Sensitivity   Result Value Ref Range    Troponin I, High Sensitivity 13 0 - 20 ng/L   Creatine Kinase   Result Value Ref Range    Creatine Kinase 332 (H) 0 - 325 U/L   Lipase   Result Value Ref Range    Lipase 29 9 - 82 U/L   Sars-CoV-2 and Influenza A/B PCR   Result Value Ref Range    Flu A Result Not Detected Not Detected    Flu B Result Not Detected Not Detected    Coronavirus 2019, PCR Not Detected Not Detected   RSV PCR   Result Value Ref Range    RSV PCR Not Detected Not Detected   Urinalysis with Reflex Microscopic   Result Value Ref Range    Color, Urine Yellow Straw, Yellow    Appearance, Urine Clear Clear    Specific  Gravity, Urine 1.014 1.005 - 1.035    pH, Urine 6.0 5.0, 5.5, 6.0, 6.5, 7.0, 7.5, 8.0    Protein, Urine NEGATIVE NEGATIVE mg/dL    Glucose, Urine NEGATIVE NEGATIVE mg/dL    Blood, Urine NEGATIVE NEGATIVE    Ketones, Urine NEGATIVE NEGATIVE mg/dL    Bilirubin, Urine NEGATIVE NEGATIVE    Urobilinogen, Urine <2.0 <2.0 mg/dL    Nitrite, Urine NEGATIVE NEGATIVE    Leukocyte Esterase, Urine NEGATIVE NEGATIVE   Lactate   Result Value Ref Range    Lactate 2.8 (H) 0.4 - 2.0 mmol/L   Drug Screen, Urine   Result Value Ref Range    Amphetamine Screen, Urine Presumptive Negative Presumptive Negative    Barbiturate Screen, Urine Presumptive Negative Presumptive Negative    Benzodiazepines Screen, Urine Presumptive Negative Presumptive Negative    Cannabinoid Screen, Urine Presumptive Positive (A) Presumptive Negative    Cocaine Metabolite Screen, Urine Presumptive Negative Presumptive Negative    Fentanyl Screen, Urine Presumptive Negative Presumptive Negative    Opiate Screen, Urine Presumptive Negative Presumptive Negative    Oxycodone Screen, Urine Presumptive Negative Presumptive Negative    PCP Screen, Urine Presumptive Negative Presumptive Negative   ECG 12 lead   Result Value Ref Range    Ventricular Rate 98 BPM    QRS Duration 104 ms    QT Interval 374 ms    QTC Calculation(Bazett) 477 ms    R Axis -69 degrees    T Axis 84 degrees    QRS Count 17 beats    Q Onset 204 ms    T Offset 391 ms    QTC Fredericia 440 ms   POCT GLUCOSE   Result Value Ref Range    POCT Glucose 150 (H) 74 - 99 mg/dL   CBC and Auto Differential   Result Value Ref Range    WBC 9.5 4.4 - 11.3 x10*3/uL    nRBC 0.0 0.0 - 0.0 /100 WBCs    RBC 4.07 (L) 4.50 - 5.90 x10*6/uL    Hemoglobin 13.1 (L) 13.5 - 17.5 g/dL    Hematocrit 39.8 (L) 41.0 - 52.0 %    MCV 98 80 - 100 fL    MCH 32.2 26.0 - 34.0 pg    MCHC 32.9 32.0 - 36.0 g/dL    RDW 14.7 (H) 11.5 - 14.5 %    Platelets 186 150 - 450 x10*3/uL    Neutrophils % 67.8 40.0 - 80.0 %    Immature Granulocytes %,  Automated 0.6 0.0 - 0.9 %    Lymphocytes % 21.3 13.0 - 44.0 %    Monocytes % 9.0 2.0 - 10.0 %    Eosinophils % 0.8 0.0 - 6.0 %    Basophils % 0.5 0.0 - 2.0 %    Neutrophils Absolute 6.45 1.20 - 7.70 x10*3/uL    Immature Granulocytes Absolute, Automated 0.06 0.00 - 0.70 x10*3/uL    Lymphocytes Absolute 2.03 1.20 - 4.80 x10*3/uL    Monocytes Absolute 0.86 0.10 - 1.00 x10*3/uL    Eosinophils Absolute 0.08 0.00 - 0.70 x10*3/uL    Basophils Absolute 0.05 0.00 - 0.10 x10*3/uL   Magnesium   Result Value Ref Range    Magnesium 1.98 1.60 - 2.40 mg/dL   Basic Metabolic Panel   Result Value Ref Range    Glucose 165 (H) 74 - 99 mg/dL    Sodium 141 136 - 145 mmol/L    Potassium 4.1 3.5 - 5.3 mmol/L    Chloride 103 98 - 107 mmol/L    Bicarbonate 27 21 - 32 mmol/L    Anion Gap 15 10 - 20 mmol/L    Urea Nitrogen 26 (H) 6 - 23 mg/dL    Creatinine 1.13 0.50 - 1.30 mg/dL    eGFR 73 >60 mL/min/1.73m*2    Calcium 8.9 8.6 - 10.3 mg/dL   Protime-INR   Result Value Ref Range    Protime 22.4 (H) 9.8 - 12.8 seconds    INR 2.0 (H) 0.9 - 1.1   POCT GLUCOSE   Result Value Ref Range    POCT Glucose 170 (H) 74 - 99 mg/dL   Transthoracic Echo (TTE) Complete   Result Value Ref Range    AV pk sofía 1.38 m/s    LVOT diam 2.50 cm    LVIDd 5.91 cm    Aortic Valve Area by Continuity of Peak Velocity 3.51 cm2    AV pk grad 7.6 mmHg   POCT GLUCOSE   Result Value Ref Range    POCT Glucose 297 (H) 74 - 99 mg/dL              Assessment/Plan   This patient currently has cardiac telemetry ordered; if you would like to modify or discontinue the telemetry order, click here to go to the orders activity to modify/discontinue the order.      Principal Problem:    Syncope and collapse    Nasir Sandoval is a 63 y.o. year old male with a history of HFpEF, HTN, HLD, type II DM c/b neuropathy, COPD (no home O2 demand), BPH, gout, PTSD, MARKIE who presents to [facility] on 2/20 after sustaining fall secondary to syncope.     Syncope with sustained fall, possibly medication induced  hypotension  Severe cervical spinal stenosis found on CT imaging  -Unlikely vasovagal, situational, orthostatic syncope.  Unlikely seizure related; tongue biting related to fall and disorientation likely from head trauma/concussed state.  No obvious sources of infection at this time  -Imaging unremarkable for acute intracranial abnormalities  -Patient recently started lisinopril 2 weeks ago with occasionally reported soft BPs at home.  Conflicting charting noted in the EMR could be leading to possible polypharmacy in relation to patient's hypertension treatment  -Echo: EF 45%  -EKG shows afib  -Neurosurgery consult suggests outpatient follow up as needed, ordered soft C spine for comfort per patient request  PLAN:  -Orthostatic vitals ordered  -Pain Regimen: Lidocaine patch, scheduled tylenol, PRN 0.2 IV Dilaudid q6, PRN PO Percocet q6  -Consulted cardiology for syncope of unknown etiology  -PT/OT to evaluate    Lactic acidosis  -Lactate elevated at 2.8  -Likely 2/2 trauma, no signs of acute infection  -repeat lab if changes seen     Atrial fibrillation (on warfarin)  HFpEF  HTN, HLD, MARKIE  -Recently started taking lisinopril 2 weeks ago for proteinuria  -INR 2.0  PLAN:  -Continue home warfarin 12.5mg everyday except Tuesday (take 10mg)  -Continue home digoxin 125 mcg  -Continue home lisinopril 2.5 mg  -Holding home Lasix, spironolactone, tamsulosin  -Continue home rosuvastatin 20 mg  -CPAP when sleeping     Type II DM  Diabetic neuropathy  -Home regimen: Glipizide 10 mg  PLAN:  -Carb controlled diet and moderate SSI  -Continue home gabapentin 800 mg 3 times daily  -Continue home duloxetine 60 mg     Diet: Carb controlled, cardiac  DVT ppx: Home warfarin  IVF: --  Consults: Cardiology  Dispo: PT/OT to evaluate      Lilliana Saucedo MS3  JORGE L Johnson MD PGY2

## 2024-02-22 VITALS
WEIGHT: 315 LBS | HEIGHT: 74 IN | HEART RATE: 84 BPM | OXYGEN SATURATION: 93 % | SYSTOLIC BLOOD PRESSURE: 152 MMHG | RESPIRATION RATE: 20 BRPM | BODY MASS INDEX: 40.43 KG/M2 | DIASTOLIC BLOOD PRESSURE: 65 MMHG | TEMPERATURE: 97.3 F

## 2024-02-22 PROBLEM — R55 SYNCOPE, UNSPECIFIED SYNCOPE TYPE: Status: ACTIVE | Noted: 2024-02-22

## 2024-02-22 LAB
ANION GAP SERPL CALC-SCNC: 12 MMOL/L (ref 10–20)
BUN SERPL-MCNC: 24 MG/DL (ref 6–23)
CALCIUM SERPL-MCNC: 9.3 MG/DL (ref 8.6–10.3)
CHLORIDE SERPL-SCNC: 101 MMOL/L (ref 98–107)
CO2 SERPL-SCNC: 29 MMOL/L (ref 21–32)
CREAT SERPL-MCNC: 1.19 MG/DL (ref 0.5–1.3)
EGFRCR SERPLBLD CKD-EPI 2021: 69 ML/MIN/1.73M*2
GLUCOSE BLD MANUAL STRIP-MCNC: 129 MG/DL (ref 74–99)
GLUCOSE BLD MANUAL STRIP-MCNC: 158 MG/DL (ref 74–99)
GLUCOSE BLD MANUAL STRIP-MCNC: 177 MG/DL (ref 74–99)
GLUCOSE SERPL-MCNC: 163 MG/DL (ref 74–99)
HOLD SPECIMEN: NORMAL
INR PPP: 2.1 (ref 0.9–1.1)
POTASSIUM SERPL-SCNC: 4 MMOL/L (ref 3.5–5.3)
PROTHROMBIN TIME: 23.4 SECONDS (ref 9.8–12.8)
SODIUM SERPL-SCNC: 138 MMOL/L (ref 136–145)

## 2024-02-22 PROCEDURE — 85610 PROTHROMBIN TIME: CPT

## 2024-02-22 PROCEDURE — 82947 ASSAY GLUCOSE BLOOD QUANT: CPT

## 2024-02-22 PROCEDURE — 36415 COLL VENOUS BLD VENIPUNCTURE: CPT

## 2024-02-22 PROCEDURE — 1200000002 HC GENERAL ROOM WITH TELEMETRY DAILY

## 2024-02-22 PROCEDURE — 2500000002 HC RX 250 W HCPCS SELF ADMINISTERED DRUGS (ALT 637 FOR MEDICARE OP, ALT 636 FOR OP/ED)

## 2024-02-22 PROCEDURE — 2500000004 HC RX 250 GENERAL PHARMACY W/ HCPCS (ALT 636 FOR OP/ED)

## 2024-02-22 PROCEDURE — 2500000001 HC RX 250 WO HCPCS SELF ADMINISTERED DRUGS (ALT 637 FOR MEDICARE OP)

## 2024-02-22 PROCEDURE — 2500000005 HC RX 250 GENERAL PHARMACY W/O HCPCS

## 2024-02-22 PROCEDURE — 80048 BASIC METABOLIC PNL TOTAL CA: CPT

## 2024-02-22 PROCEDURE — 99239 HOSP IP/OBS DSCHRG MGMT >30: CPT

## 2024-02-22 RX ORDER — ALBUTEROL SULFATE 90 UG/1
2 AEROSOL, METERED RESPIRATORY (INHALATION) EVERY 6 HOURS PRN
Status: DISCONTINUED | OUTPATIENT
Start: 2024-02-22 | End: 2024-02-22 | Stop reason: HOSPADM

## 2024-02-22 RX ORDER — OXYCODONE AND ACETAMINOPHEN 5; 325 MG/1; MG/1
2 TABLET ORAL EVERY 8 HOURS PRN
Qty: 18 TABLET | Refills: 0 | Status: SHIPPED | OUTPATIENT
Start: 2024-02-22 | End: 2024-02-25

## 2024-02-22 RX ORDER — SPIRONOLACTONE 25 MG/1
50 TABLET ORAL DAILY
Status: DISCONTINUED | OUTPATIENT
Start: 2024-02-22 | End: 2024-02-22

## 2024-02-22 RX ORDER — TAMSULOSIN HYDROCHLORIDE 0.4 MG/1
0.4 CAPSULE ORAL DAILY
Status: DISCONTINUED | OUTPATIENT
Start: 2024-02-22 | End: 2024-02-22 | Stop reason: HOSPADM

## 2024-02-22 RX ORDER — SENNOSIDES 8.6 MG/1
1 TABLET ORAL DAILY
Qty: 30 TABLET | Refills: 0 | Status: SHIPPED | OUTPATIENT
Start: 2024-02-22 | End: 2024-03-23

## 2024-02-22 RX ORDER — CYCLOBENZAPRINE HCL 10 MG
5 TABLET ORAL 2 TIMES DAILY PRN
Status: DISCONTINUED | OUTPATIENT
Start: 2024-02-22 | End: 2024-02-22 | Stop reason: HOSPADM

## 2024-02-22 RX ORDER — ONDANSETRON 4 MG/1
4 TABLET, FILM COATED ORAL EVERY 8 HOURS PRN
Qty: 20 TABLET | Refills: 0 | Status: SHIPPED | OUTPATIENT
Start: 2024-02-22 | End: 2024-02-27

## 2024-02-22 RX ORDER — CYCLOBENZAPRINE HCL 5 MG
5 TABLET ORAL 2 TIMES DAILY PRN
Qty: 10 TABLET | Refills: 0 | Status: SHIPPED | OUTPATIENT
Start: 2024-02-22 | End: 2024-06-02 | Stop reason: HOSPADM

## 2024-02-22 RX ADMIN — ONDANSETRON 4 MG: 4 TABLET, ORALLY DISINTEGRATING ORAL at 12:30

## 2024-02-22 RX ADMIN — ONDANSETRON 4 MG: 2 INJECTION INTRAMUSCULAR; INTRAVENOUS at 06:55

## 2024-02-22 RX ADMIN — TAMSULOSIN HYDROCHLORIDE 0.4 MG: 0.4 CAPSULE ORAL at 09:21

## 2024-02-22 RX ADMIN — DIGOXIN 125 MCG: 125 TABLET ORAL at 08:36

## 2024-02-22 RX ADMIN — GABAPENTIN 800 MG: 400 CAPSULE ORAL at 08:35

## 2024-02-22 RX ADMIN — POLYETHYLENE GLYCOL 3350 17 G: 17 POWDER, FOR SOLUTION ORAL at 08:37

## 2024-02-22 RX ADMIN — OXYCODONE HYDROCHLORIDE AND ACETAMINOPHEN 2 TABLET: 5; 325 TABLET ORAL at 00:25

## 2024-02-22 RX ADMIN — ONDANSETRON 4 MG: 2 INJECTION, SOLUTION INTRAMUSCULAR; INTRAVENOUS at 00:35

## 2024-02-22 RX ADMIN — INSULIN LISPRO 2 UNITS: 100 INJECTION, SOLUTION INTRAVENOUS; SUBCUTANEOUS at 12:23

## 2024-02-22 RX ADMIN — CYCLOBENZAPRINE 5 MG: 10 TABLET, FILM COATED ORAL at 00:37

## 2024-02-22 RX ADMIN — SPIRONOLACTONE 50 MG: 25 TABLET, FILM COATED ORAL at 08:35

## 2024-02-22 RX ADMIN — LISINOPRIL 2.5 MG: 5 TABLET ORAL at 08:36

## 2024-02-22 RX ADMIN — HYDROMORPHONE HYDROCHLORIDE 0.2 MG: 0.2 INJECTION, SOLUTION INTRAMUSCULAR; INTRAVENOUS; SUBCUTANEOUS at 06:53

## 2024-02-22 RX ADMIN — INSULIN LISPRO 2 UNITS: 100 INJECTION, SOLUTION INTRAVENOUS; SUBCUTANEOUS at 08:37

## 2024-02-22 RX ADMIN — OXYCODONE HYDROCHLORIDE AND ACETAMINOPHEN 2 TABLET: 5; 325 TABLET ORAL at 06:44

## 2024-02-22 RX ADMIN — HYDROMORPHONE HYDROCHLORIDE 0.2 MG: 0.2 INJECTION, SOLUTION INTRAMUSCULAR; INTRAVENOUS; SUBCUTANEOUS at 00:26

## 2024-02-22 RX ADMIN — OXYCODONE HYDROCHLORIDE AND ACETAMINOPHEN 2 TABLET: 5; 325 TABLET ORAL at 12:29

## 2024-02-22 RX ADMIN — ACETAMINOPHEN 650 MG: 325 TABLET ORAL at 08:37

## 2024-02-22 RX ADMIN — LIDOCAINE 1 PATCH: 4 PATCH TOPICAL at 00:23

## 2024-02-22 RX ADMIN — CYCLOBENZAPRINE 5 MG: 10 TABLET, FILM COATED ORAL at 06:55

## 2024-02-22 RX ADMIN — CYCLOBENZAPRINE 5 MG: 10 TABLET, FILM COATED ORAL at 12:30

## 2024-02-22 RX ADMIN — DULOXETINE HYDROCHLORIDE 60 MG: 30 CAPSULE, DELAYED RELEASE ORAL at 08:35

## 2024-02-22 ASSESSMENT — PAIN SCALES - GENERAL
PAINLEVEL_OUTOF10: 0 - NO PAIN
PAINLEVEL_OUTOF10: 10 - WORST POSSIBLE PAIN
PAINLEVEL_OUTOF10: 1
PAINLEVEL_OUTOF10: 5 - MODERATE PAIN
PAINLEVEL_OUTOF10: 10 - WORST POSSIBLE PAIN

## 2024-02-22 ASSESSMENT — PAIN - FUNCTIONAL ASSESSMENT
PAIN_FUNCTIONAL_ASSESSMENT: 0-10

## 2024-02-22 ASSESSMENT — PAIN DESCRIPTION - LOCATION: LOCATION: OTHER (COMMENT)

## 2024-02-22 NOTE — NURSING NOTE
1415 Reviewed dc packet instructions with pt and wife. Answered all questions.   1422 Pt left unit via wheelchair with all belongings and dc packet in stable condition.

## 2024-02-22 NOTE — DISCHARGE INSTRUCTIONS
You are being discharged on multiple medications with changes to your home medication regimen.  Please note the changes in your medication regimen.    We have discontinued your spironolactone 50 mg medication.  We do not recommend that you take this medication as it may cause low blood pressure.    We are discharging you on oxycodone 5 mg to be taken every 8 hours as needed for severe pain.  Please use this medication with moderation and note that it is associated with side effects such as lower blood pressure and sleepiness or sedation.    Flexeril 5 mg to be taken twice daily as needed for the next 3 days.  This medication is a muscle relaxer it is a central nervous system depressant and can cause you to be sleepy and weak.  Please use this medication with caution.    Senokot to be taken once daily.  This medication is to help prevent constipation that may be associated with the use of the oxycodone medication.    Zofran 4 mg to be taken as needed for nausea, avoid taking more than 3 doses of this medication per day.    Follow-up with your primary care doctor within 1 week of discharge from the hospital.  You will need the staples removed from your head injury within 7 to 10 days.

## 2024-02-22 NOTE — DISCHARGE SUMMARY
Discharge Diagnosis  Syncope and collapse    Issues Requiring Follow-Up  Atrial fibrillation (on warfarin)  HFpEF  HTN, HLD, MARKIE  Type II DM  Diabetic neuropathy  Severe cervical spinal stenosis    Test Results Pending At Discharge  Pending Labs       No current pending labs.            Hospital Course   Nasir Sandoval is a 63 y.o. year old male with a history of HFpEF, HTN, HLD, type II DM c/b neuropathy, COPD (no home O2 demand), BPH, gout, PTSD who presents to [facility] on 2/20 after sustaining fall secondary to syncope. Found to have severe spinal stenosis, consult to neurosurgeon. No need for TLSO, however the patient requested soft cervical collar for comfort. Telemetry unremarkable, Echo obtained. Unclear etiology of syncope. Managed pain with IV analgesics, weaned onto PO pain meds. Discharged to home with instructions to follow up closely with PCP for staple removal as well as Neurosurgery as needed.    Pertinent Physical Exam At Time of Discharge    Constitutional: Morbid obesity, A&Ox3, severe pain with movement and neck/back, alert and cooperative  Eyes: Right eye surgically removed, EOMI, clear sclera  Respiratory/Thorax: Distant breath sounds, bilateral wheezes  Cardiovascular: Distant cardiac sounds, RRR, no murmurs, 2+ equal pulses of the extremities  Gastrointestinal: Obese habitus, soft, non-tender  Extremities: no cyanosis edema, no clubbing  Psychological: Appropriate mood and behavior  Skin: Warm and dry, small hematoma of the left flank       Home Medications     Medication List      START taking these medications     cyclobenzaprine 5 mg tablet; Commonly known as: Flexeril; Take 1 tablet   (5 mg) by mouth 2 times a day as needed for muscle spasms for up to 5   days.   ondansetron 4 mg tablet; Commonly known as: Zofran; Take 1 tablet (4 mg)   by mouth every 8 hours if needed for nausea or vomiting for up to 5 days.   oxyCODONE-acetaminophen 5-325 mg tablet; Commonly known as: Percocet;   Take 2  tablets by mouth every 8 hours if needed for severe pain (7 - 10)   for up to 3 days.   sennosides 8.6 mg tablet; Commonly known as: Senokot; Take 1 tablet (8.6   mg) by mouth once daily.     CHANGE how you take these medications     Fish OiL 1,000 mg (120 mg-180 mg) capsule; Generic drug: omega   3-dha-epa-fish oil; What changed: Another medication with the same name   was removed. Continue taking this medication, and follow the directions   you see here.   furosemide 40 mg tablet; Commonly known as: Lasix; What changed: Another   medication with the same name was removed. Continue taking this   medication, and follow the directions you see here.     CONTINUE taking these medications     albuterol 90 mcg/actuation inhaler   ammonium lactate 12 % cream; Commonly known as: Amlactin   bacitracin 500 unit/gram ointment   carboxymethylcellulose 0.5 % ophthalmic solution; Commonly known as:   Refresh Plus   cetirizine 5 mg tablet; Commonly known as: ZyrTEC   cholecalciferol 25 MCG (1000 UT) tablet; Commonly known as: Vitamin D-3   clindamycin 1 % external solution; Commonly known as: Cleocin T   digoxin 125 MCG tablet; Commonly known as: Lanoxin   docusate sodium 100 mg capsule; Commonly known as: Colace   DULoxetine 60 mg DR capsule; Commonly known as: Cymbalta   FISH OIL ORAL   * gabapentin 400 mg capsule; Commonly known as: Neurontin   * gabapentin 800 mg tablet; Commonly known as: Neurontin; Take 1 tablet   (800 mg) by mouth 3 times a day.   glipiZIDE 10 mg tablet; Commonly known as: Glucotrol   ketoconazole 2 % shampoo; Commonly known as: NIZOral   KRILL OIL ORAL   lisinopril 2.5 mg tablet   magnesium oxide 400 mg (241.3 mg magnesium) tablet; Commonly known as:   Mag-Ox   METAMUCIL ORAL   multivitamin tablet   potassium chloride CR 20 mEq ER tablet; Commonly known as: Klor-Con M20   potassium citrate CR 5 mEq ER tablet; Commonly known as: Urocit-K-5   psyllium 3.4 gram packet; Commonly known as: Metamucil    rosuvastatin 20 mg tablet; Commonly known as: Crestor   simvastatin 10 mg tablet; Commonly known as: Zocor   tamsulosin 0.4 mg 24 hr capsule; Commonly known as: Flomax   topiramate 50 mg tablet; Commonly known as: Topamax; Take 1 tablet (50   mg) by mouth 2 times a day.   vitamin D3-vitamin K2 (MK4) 1,000-100 unit-mcg tablet   * warfarin 10 mg tablet; Commonly known as: Coumadin; Notes to patient:   TUESDAYS 10mg   * warfarin 5 mg tablet; Commonly known as: Coumadin  * This list has 4 medication(s) that are the same as other medications   prescribed for you. Read the directions carefully, and ask your doctor or   other care provider to review them with you.     STOP taking these medications     erythromycin 5 mg/gram (0.5 %) ophthalmic ointment; Commonly known as:   Romycin   levoFLOXacin 750 mg tablet; Commonly known as: Levaquin   prazosin 2 mg capsule; Commonly known as: Minipress   spironolactone 50 mg tablet; Commonly known as: Aldactone       Outpatient Follow-Up  Future Appointments   Date Time Provider Department [place]   2/27/2024  9:45 AM Dutch Milan MD [id number] [place]   2/29/2024  9:30 AM Danya Bowman RD, LD ELYNTR [place]   2/29/2024  9:45 AM Casey Dick DO [id number] [place]   4/1/2024 12:30 PM [facility] NEURODG EMG EQUIP STNEUD [place]   4/30/2024  9:30 AM Casey Dick DO [id number] [place]   8/6/2024  8:30 AM Radha Irvin MD PhD [id number] [place]       Jo Johnson MD

## 2024-02-22 NOTE — NURSING NOTE
Upon shift change reported by day shift R.N. patient is dislodging dressing around new dialysis port placed 2/21/2024. Bedside there was PCNA holding direct pressure for approximately 45 minutes. My assessment indicated that the bleeding had been slowed. I redressed the sight with 3 new Tegaderm over the preexisting one so as not to disturb the new clot. Gauze padding was placed between the patient and a firm point secured with dressing tape that would continue holding pressure. Will continue to monitor.

## 2024-02-23 ENCOUNTER — PATIENT OUTREACH (OUTPATIENT)
Dept: CARE COORDINATION | Facility: CLINIC | Age: 64
End: 2024-02-23
Payer: MEDICARE

## 2024-02-23 NOTE — PROGRESS NOTES
Discharge Facility: [facility]  Discharge Diagnosis: syncope and collapse  Admission Date: 2/20/24  Discharge Date: 2/22/24    PCP Appointment Date: 2/29/24  Specialist Appointment Date: 2/27/24 Cardiology  Hospital Encounter and Summary: Linked  See discharge assessment below for further details    Engagement  Call Start Time: 0935 (2/23/2024  9:38 AM)    Medications  Medications reviewed with patient/caregiver?: Yes (2/23/2024  9:38 AM)  Is the patient having any side effects they believe may be caused by any medication additions or changes?: No (2/23/2024  9:38 AM)  Does the patient have all medications ordered at discharge?: Yes (2/23/2024  9:38 AM)  Care Management Interventions: No intervention needed (2/23/2024  9:38 AM)  Prescription Comments: No longer on minipress, supposed to discontinue spironolactone due to risk of low BP with falls but Nasir prefers to talk with Dr Milan next week before stopping. Recently started on lisinopril per endocrinology (2/23/2024  9:38 AM)  Is the patient taking all medications as directed (includes completed medication regime)?: Yes (2/23/2024  9:38 AM)  Care Management Interventions: Provided patient education (2/23/2024  9:38 AM)  Medication Comments: On flexiril and oxycodone for pain from fall. (2/23/2024  9:38 AM)    Appointments  Does the patient have a primary care provider?: Yes (2/23/2024  9:38 AM)  Care Management Interventions: Verified appointment date/time/provider (2/23/2024  9:38 AM)  Has the patient kept scheduled appointments due by today?: Yes (2/23/2024  9:38 AM)  Care Management Interventions: Advised patient to keep appointment (2/23/2024  9:38 AM)    Patient Teaching  Does the patient have access to their discharge instructions?: Yes (2/23/2024  9:38 AM)  Care Management Interventions: Reviewed instructions with patient (2/23/2024  9:38 AM)  What is the patient's perception of their health status since discharge?: Returned to baseline/stable (2/23/2024   9:38 AM)  Is the patient/caregiver able to teach back the hierarchy of who to call/visit for symptoms/problems? PCP, Specialist, Home Health nurse, Urgent Care, ED, 911: Yes (2/23/2024  9:38 AM)  Patient/Caregiver Education Comments: Aware to rest and increase activity as tolerated, use caution when changing positions. (2/23/2024  9:38 AM)    Wrap Up  Wrap Up Additional Comments: Nasir is very sore and taking it easy at home, reviewed follow ups and plan of care. (2/23/2024  9:38 AM)  Call End Time: 0945 (2/23/2024  9:38 AM)

## 2024-02-24 LAB
Q ONSET: 204 MS
QRS COUNT: 17 BEATS
QRS DURATION: 104 MS
QT INTERVAL: 374 MS
QTC CALCULATION(BAZETT): 477 MS
QTC FREDERICIA: 440 MS
R AXIS: -69 DEGREES
T AXIS: 84 DEGREES
T OFFSET: 391 MS
VENTRICULAR RATE: 98 BPM

## 2024-02-26 ENCOUNTER — TELEPHONE (OUTPATIENT)
Dept: PRIMARY CARE | Facility: CLINIC | Age: 64
End: 2024-02-26
Payer: MEDICARE

## 2024-02-26 NOTE — PROGRESS NOTES
Pt called left message asking for oxycodone and flexiril to be refilled from hospital script since he is still in a lot of pain from the fall. Sees Dr Mayelin Hernandez 2/29. Thank you please call pt when these are refilled!

## 2024-02-26 NOTE — TELEPHONE ENCOUNTER
Just got out of hospital wondering of you can send in refills on oxy and flexeril to [facility] [phone]

## 2024-02-27 ENCOUNTER — APPOINTMENT (OUTPATIENT)
Dept: CARDIOLOGY | Facility: CLINIC | Age: 64
End: 2024-02-27
Payer: MEDICARE

## 2024-02-29 ENCOUNTER — OFFICE VISIT (OUTPATIENT)
Dept: CARDIOLOGY | Facility: CLINIC | Age: 64
End: 2024-02-29
Payer: MEDICARE

## 2024-02-29 ENCOUNTER — LAB (OUTPATIENT)
Dept: LAB | Facility: LAB | Age: 64
End: 2024-02-29
Payer: MEDICARE

## 2024-02-29 ENCOUNTER — APPOINTMENT (OUTPATIENT)
Dept: NUTRITION | Facility: HOSPITAL | Age: 64
End: 2024-02-29
Payer: MEDICARE

## 2024-02-29 ENCOUNTER — OFFICE VISIT (OUTPATIENT)
Dept: PRIMARY CARE | Facility: CLINIC | Age: 64
End: 2024-02-29
Payer: MEDICARE

## 2024-02-29 VITALS
HEIGHT: 74 IN | DIASTOLIC BLOOD PRESSURE: 78 MMHG | HEART RATE: 100 BPM | SYSTOLIC BLOOD PRESSURE: 148 MMHG | WEIGHT: 315 LBS | OXYGEN SATURATION: 94 % | BODY MASS INDEX: 40.43 KG/M2

## 2024-02-29 VITALS — BODY MASS INDEX: 46.35 KG/M2 | WEIGHT: 315 LBS | DIASTOLIC BLOOD PRESSURE: 93 MMHG | SYSTOLIC BLOOD PRESSURE: 147 MMHG

## 2024-02-29 DIAGNOSIS — S05.71XD: ICD-10-CM

## 2024-02-29 DIAGNOSIS — Z00.00 HEALTH CARE MAINTENANCE: ICD-10-CM

## 2024-02-29 DIAGNOSIS — G62.9 NEUROPATHY: ICD-10-CM

## 2024-02-29 DIAGNOSIS — I50.32 CHRONIC HEART FAILURE WITH PRESERVED EJECTION FRACTION (MULTI): ICD-10-CM

## 2024-02-29 DIAGNOSIS — E11.9 TYPE 2 DIABETES MELLITUS WITHOUT COMPLICATION, WITHOUT LONG-TERM CURRENT USE OF INSULIN (MULTI): ICD-10-CM

## 2024-02-29 DIAGNOSIS — H33.22 LEFT RETINAL DETACHMENT: ICD-10-CM

## 2024-02-29 DIAGNOSIS — R55 SYNCOPE, UNSPECIFIED SYNCOPE TYPE: Primary | ICD-10-CM
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
## 2024-02-29 DIAGNOSIS — I50.32 CHRONIC DIASTOLIC (CONGESTIVE) HEART FAILURE (MULTI): Primary | ICD-10-CM

## 2024-02-29 DIAGNOSIS — I48.19 PERSISTENT ATRIAL FIBRILLATION (MULTI): ICD-10-CM

## 2024-02-29 DIAGNOSIS — G47.33 OBSTRUCTIVE SLEEP APNEA OF ADULT: ICD-10-CM

## 2024-02-29 DIAGNOSIS — I10 HYPERTENSION, UNSPECIFIED TYPE: ICD-10-CM

## 2024-02-29 DIAGNOSIS — R55 SYNCOPE, UNSPECIFIED SYNCOPE TYPE: ICD-10-CM

## 2024-02-29 DIAGNOSIS — E66.01 MORBID OBESITY (MULTI): ICD-10-CM

## 2024-02-29 DIAGNOSIS — Z79.01 LONG TERM (CURRENT) USE OF ANTICOAGULANTS: Primary | ICD-10-CM

## 2024-02-29 DIAGNOSIS — Z79.01 LONG TERM (CURRENT) USE OF ANTICOAGULANTS: ICD-10-CM

## 2024-02-29 LAB
INR PPP: 1.6 (ref 0.9–1.1)
PROTHROMBIN TIME: 18 SECONDS (ref 9.8–12.8)

## 2024-02-29 PROCEDURE — 4010F ACE/ARB THERAPY RXD/TAKEN: CPT | Performed by: STUDENT IN AN ORGANIZED HEALTH CARE EDUCATION/TRAINING PROGRAM

## 2024-02-29 PROCEDURE — 3051F HG A1C>EQUAL 7.0%<8.0%: CPT | Performed by: STUDENT IN AN ORGANIZED HEALTH CARE EDUCATION/TRAINING PROGRAM

## 2024-02-29 PROCEDURE — 85610 PROTHROMBIN TIME: CPT

## 2024-02-29 PROCEDURE — 3080F DIAST BP >= 90 MM HG: CPT | Performed by: STUDENT IN AN ORGANIZED HEALTH CARE EDUCATION/TRAINING PROGRAM

## 2024-02-29 PROCEDURE — 3077F SYST BP >= 140 MM HG: CPT | Performed by: STUDENT IN AN ORGANIZED HEALTH CARE EDUCATION/TRAINING PROGRAM

## 2024-02-29 PROCEDURE — 3078F DIAST BP <80 MM HG: CPT | Performed by: INTERNAL MEDICINE

## 2024-02-29 PROCEDURE — 1036F TOBACCO NON-USER: CPT | Performed by: STUDENT IN AN ORGANIZED HEALTH CARE EDUCATION/TRAINING PROGRAM

## 2024-02-29 PROCEDURE — 3074F SYST BP LT 130 MM HG: CPT | Performed by: INTERNAL MEDICINE

## 2024-02-29 PROCEDURE — 36415 COLL VENOUS BLD VENIPUNCTURE: CPT

## 2024-02-29 PROCEDURE — 3060F POS MICROALBUMINURIA REV: CPT | Performed by: INTERNAL MEDICINE

## 2024-02-29 PROCEDURE — 3060F POS MICROALBUMINURIA REV: CPT | Performed by: STUDENT IN AN ORGANIZED HEALTH CARE EDUCATION/TRAINING PROGRAM

## 2024-02-29 PROCEDURE — 99496 TRANSJ CARE MGMT HIGH F2F 7D: CPT | Performed by: STUDENT IN AN ORGANIZED HEALTH CARE EDUCATION/TRAINING PROGRAM

## 2024-02-29 PROCEDURE — 99215 OFFICE O/P EST HI 40 MIN: CPT | Performed by: INTERNAL MEDICINE

## 2024-02-29 PROCEDURE — 1036F TOBACCO NON-USER: CPT | Performed by: INTERNAL MEDICINE

## 2024-02-29 PROCEDURE — 3051F HG A1C>EQUAL 7.0%<8.0%: CPT | Performed by: INTERNAL MEDICINE

## 2024-02-29 PROCEDURE — 4010F ACE/ARB THERAPY RXD/TAKEN: CPT | Performed by: INTERNAL MEDICINE

## 2024-02-29 ASSESSMENT — ENCOUNTER SYMPTOMS
CARDIOVASCULAR NEGATIVE: 1
NEUROLOGICAL NEGATIVE: 1
RESPIRATORY NEGATIVE: 1
BACK PAIN: 1
CONSTITUTIONAL NEGATIVE: 1
GASTROINTESTINAL NEGATIVE: 1
ARTHRALGIAS: 1
PSYCHIATRIC NEGATIVE: 1

## 2024-02-29 NOTE — PROGRESS NOTES
"Removal of staples on headPatient: Nasir Sandoval  : 1960  PCP: Casey Dick DO  MRN: [id number]  Program: No linked episodes     Nasir Sandoval is a 63 y.o. male presenting today for follow-up after being discharged from the hospital 7 days ago. The main problem requiring admission was syncope. The discharge summary and/or Transitional Care Management documentation was reviewed. Medication reconciliation was performed as indicated via the \"Ramos as Reviewed\" timestamp.     Nasir Sandoval was contacted by Transitional Care Management services two days after his discharge. This encounter and supporting documentation was reviewed.    The complexity of medical decision making for this patient's transitional care is high.    Patient seen on transitional care management.  Was recently admitted to the hospital due to syncope.  Was to have severe spinal stenosis, and was seen by neurosurgery.  Recommend supportive care and close outpatient follow-up.  Patient also had staples placed at this time and recommended outpatient follow-up as well.  On evaluation, patient reports no issues.  He has has had some weight gain since being out of the hospital, however has also stated that his pain has not been well-controlled.  He did request a refill of his narcotics that he was given from the hospital, however, he was advised to follow-up with pain management and referral was placed at this time.  He reports that he will no longer be coming to this office.  Otherwise feels well    Physical Exam  Constitutional:       General: He is not in acute distress.     Appearance: He is not ill-appearing.   Eyes:      Pupils: Pupils are equal, round, and reactive to light.   Cardiovascular:      Rate and Rhythm: Normal rate and regular rhythm.      Pulses: Normal pulses.      Heart sounds: No murmur heard.  Pulmonary:      Effort: No respiratory distress.      Breath sounds: No wheezing.   Abdominal:      General: Abdomen is flat. Bowel " sounds are normal. There is no distension.   Musculoskeletal:      Right lower leg: No edema.      Left lower leg: No edema.   Skin:     General: Skin is warm and dry.      Comments: Noted staples, removed without difficulty   Neurological:      Mental Status: He is alert. Mental status is at baseline.      Cranial Nerves: No cranial nerve deficit.      Motor: No weakness.   Psychiatric:         Mood and Affect: Mood normal.         Behavior: Behavior normal.         Assessment/Plan   Problem List Items Addressed This Visit             ICD-10-CM    (HFpEF) heart failure with preserved ejection fraction (CMS/HCC) I50.30    Hypertension I10    Chronic diastolic (congestive) heart failure (CMS/HCC) - Primary I50.32    Syncope R55     Other Visit Diagnoses         Codes    Fall, initial encounter     W19.XXXA    Health care maintenance     Z00.00    Relevant Orders    Referral to Primary Care          Patient seen on follow-up and transitional care management    #Syncope  #Transitional care management  #HFpEF  #Chronic pain  Patient recently admitted to the hospital secondary to syncope  Due to syncopal episodes, do not recommend use of narcotics until evaluated by pain management specialist, this referral has been placed to the patient  Recommend follow-up with cardiology due to recent syncopal episodes  Is having some fluid overload, recommend follow-up with cardiology could potentially could increase diuretics however patient defers today  Requested new primary care provider, referral placed today  Patient will be followed for 30 days, then per request of patient will be discharged from practice    Return to care as needed for next 30 days and must follow-up with new primary care provider    Review of Systems   Constitutional: Negative.    HENT: Negative.     Respiratory: Negative.     Cardiovascular: Negative.    Gastrointestinal: Negative.    Musculoskeletal:  Positive for arthralgias and back pain.   Skin:  Negative.    Neurological: Negative.    Psychiatric/Behavioral: Negative.         No family history on file.    Engagement  Call Start Time: 0935 (2/23/2024  9:38 AM)    Medications  Medications reviewed with patient/caregiver?: Yes (2/23/2024  9:38 AM)  Is the patient having any side effects they believe may be caused by any medication additions or changes?: No (2/23/2024  9:38 AM)  Does the patient have all medications ordered at discharge?: Yes (2/23/2024  9:38 AM)  Care Management Interventions: No intervention needed (2/23/2024  9:38 AM)  Prescription Comments: No longer on minipress, supposed to discontinue spironolactone due to risk of low BP with falls but Nasir prefers to talk with Dr Milan next week before stopping. Recently started on lisinopril per endocrinology (2/23/2024  9:38 AM)  Is the patient taking all medications as directed (includes completed medication regime)?: Yes (2/23/2024  9:38 AM)  Care Management Interventions: Provided patient education (2/23/2024  9:38 AM)  Medication Comments: On flexiril and oxycodone for pain from fall. (2/23/2024  9:38 AM)    Appointments  Does the patient have a primary care provider?: Yes (2/23/2024  9:38 AM)  Care Management Interventions: Verified appointment date/time/provider (2/23/2024  9:38 AM)  Has the patient kept scheduled appointments due by today?: Yes (2/23/2024  9:38 AM)  Care Management Interventions: Advised patient to keep appointment (2/23/2024  9:38 AM)    Patient Teaching  Does the patient have access to their discharge instructions?: Yes (2/23/2024  9:38 AM)  Care Management Interventions: Reviewed instructions with patient (2/23/2024  9:38 AM)  What is the patient's perception of their health status since discharge?: Returned to baseline/stable (2/23/2024  9:38 AM)  Is the patient/caregiver able to teach back the hierarchy of who to call/visit for symptoms/problems? PCP, Specialist, Home Health nurse, Urgent Care, ED, 911: Yes (2/23/2024   9:38 AM)  Patient/Caregiver Education Comments: Aware to rest and increase activity as tolerated, use caution when changing positions. (2/23/2024  9:38 AM)    Wrap Up  Wrap Up Additional Comments: Nasir is very sore and taking it easy at home, reviewed follow ups and plan of care. (2/23/2024  9:38 AM)  Call End Time: 0945 (2/23/2024  9:38 AM)        No follow-ups on file.

## 2024-03-01 NOTE — ASSESSMENT & PLAN NOTE
Admitted [facility] 2/20//24.  2/21/24 echocardiogram LVEF = 45%, moderated concentric LVH, moderate LAE.  Mild tgo moderate thoracici aortic dilatations

## 2024-03-04 ENCOUNTER — APPOINTMENT (OUTPATIENT)
Dept: CARDIOLOGY | Facility: CLINIC | Age: 64
End: 2024-03-04
Payer: MEDICARE

## 2024-03-04 ENCOUNTER — APPOINTMENT (OUTPATIENT)
Dept: ORTHOPEDIC SURGERY | Facility: CLINIC | Age: 64
End: 2024-03-04
Payer: MEDICARE

## 2024-03-07 ENCOUNTER — TELEMEDICINE CLINICAL SUPPORT (OUTPATIENT)
Dept: NUTRITION | Facility: HOSPITAL | Age: 64
End: 2024-03-07
Payer: MEDICARE

## 2024-03-07 DIAGNOSIS — E11.9 TYPE 2 DIABETES MELLITUS WITHOUT COMPLICATION, WITHOUT LONG-TERM CURRENT USE OF INSULIN (MULTI): Primary | ICD-10-CM

## 2024-03-07 NOTE — PROGRESS NOTES
Reason for Nutrition Visit:  Pt is a 63 y.o. male referred for   · Diabetes mellitus (250.00) (E11.9)   Pt initial assessment completed in [facility]    Past Medical Hx:  Patient Active Problem List   Diagnosis    (HFpEF) heart failure with preserved ejection fraction (CMS/HCC)    A-fib (CMS/HCC)    Combined hyperlipidemia    Hypertension    Nausea and vomiting    Pseudophakia of left eye    Left retinal detachment    Traumatic enucleation of right eye    Achalasia, esophageal    Cellulitis    Constipation, chronic    Dizziness    Foot pain    Pain in joint, lower leg    Neuropathy    Chest pain    Choroidal hemorrhage    Drug abuse (CMS/HCC)    Obstructive sleep apnea of adult    Ruptured globe of right eye    Ureteral stone with hydronephrosis    Vitreous hemorrhage of right eye (CMS/HCC)    Chronic alcohol abuse    Chronic diastolic (congestive) heart failure (CMS/HCC)    Explosive personality disorder (CMS/HCC)    Internal hemorrhoids    Major depressive disorder, recurrent, in partial remission (CMS/HCC)    Morbid obesity (CMS/HCC)    Narcissistic personality disorder (CMS/HCC)    Panic disorder with agoraphobia    Type 2 diabetes mellitus without complications (CMS/HCC)    Gastroesophageal reflux disease    Peripheral neuropathy    Anemia    Disease due to severe acute respiratory syndrome coronavirus 2 (SARS-CoV-2)    Fatigue    Gout    Diabetes mellitus (CMS/HCC)    Hyperglycemia    Hypoxemia    Iron deficiency    Posterior vitreous detachment    Posttraumatic stress disorder    Syncope and collapse    Syncope, unspecified syncope type    Syncope        Weight change:    Significant Weight Change: No    Lab Results   Component Value Date    HGBA1C 7.3 (H) 01/30/2024    CHOL 83 01/17/2023    LDLF 14 01/17/2023    TRIG 188 (H) 01/17/2023        Food and Nutrition Hx:    Follow up 3/7/24:   Pt was recently hospitalized d/t syncope s/p fall. Pt reports it is still hard to move neck back and forth. Pt lost weight from  hospital, ~10lb wt gain d/t fluids x 4 days. Pt weighs 359 this morning. Pt is still checking BG levels, 156 this morning before eating, during the day 2 hours after eating, around 141. Pt has been trying to stop eating at 8-9pm ~12 hour eating window. Pt eats 3 meals in a day. Pt has not done a lot of exercise due to fatigue. Following up with Cardiologist in one month. Pt has not been counting but cutting out carbs more. Limiting sugar, eating some veggies (peppers, celery, carrots). Wife cooks breakfast, pt makes lunch and dinner: low sodium tuna sandwich, soup, turkey, meatloaf, etc. Pt may order food from son who works at a restaurant to eat for meals. Pt reports he is eating better.    -Reviewed goals. Encouraged pt to discuss an appropriate exercise routine with Cardiologist for wt loss and T2DM- pt aware exercise is important but feels to fatigued with new medications.     Follow up 11/30/23:  Pt reports that he has been trying to eat better and buy better food options from the store. Pt states he has been trying to decrease salt intake, and eat less process foods. Pt reports fasting blood sugars in the 170's, today was 140. Pt reports he has been trying not to eat at night, wants to have 10-12 hour fast at night. Pt reports wt loss - was 360 lbs, now down to 351 lbs. Pt reports that he has not been counting carbs but has been more conscious of eating smaller portions. Pt states he has not been exercising.   - Reviewed goals. Encouraged pt begin exercise regimen for wt loss and T2DM management - pt states he has a stationary bike but has not put it together - pt reports he knows exercise is important.     Follow up 10/26/23:  Pt here today for diabetes education follow up. Pt states he has not been able to implement at dietary changes at this point. Pt expressed anger about his current health status and the fact that his diabetes has gotten worse, development of painful neuropathy which has also attributes  to feelings of anger and sadness regarding his health status. Pt reports blood sugar readings average in the 150's. Pt denies changes in wt - states weight this morning was 357 lbs. Pt did report that he has been trying to increase vegetables intake and making his own soups. Pt states he gave information packet that he received at his first appointment to his wife who plans meals and grocery shops. Pt reports his goals are to eat healthier and start exercising.       Allergies: None  Intolerance: None  Appetite: Normal  Intake: >75%  GI Symptoms : None  Swallowing Difficulty: No problems with swallowing  Dentition : own    Types of Activities: Mostly Sedentary     Food Preparation: Patient and Partner/Spouse  Cooking Skills/Barriers: None reported  Grocery Shopping: Patient and Partner/Spouse       Nutrition Focused Physical Exam:    Performed/Deferred: Deferred due to be being virtual visit    Estimated Energy Needs:  Weight Loss Needs: 1800 kcal/day    Nutrition Diagnosis:    Diagnosis Statement 1:  Diagnosis Status: Ongoing  Diagnosis : Obese related to  excessive energy intake  as evidenced by  BMI 47, daily consuming restaurant foods.      Nutrition Interventions:  Consistent Carbohydrate Diet, Decreased Carbohydrate Diet, and Increased Protein Diet  Nutrition Counseling: Motivational Interviewing  Coordination of Care: None    Nutrition Goals:  Nutrition Goals : Blood glucose control  Consistent meal/snack pattern  Normalized fasting and postprandial blood glucose  Pt goals not met - blood sugar remains >130 in the morning but improving through changes in food intake and eating habits    Nutrition Recommendations:  Via teach back method patient verbalized understanding of the following topics:  1. Eat a bedtime snack that contains a carb and protein to help with morning blood glucose levels  2. Eat 3 servings of carb per meal  3. Plan meals ahead of time  4. Measure foods for accurate portions  5. Read labels  for carb content.     Educational Handouts: none at this time.     Readiness to Change : Fair  Level of Understanding : Good  Anticipated Compliant : Fair

## 2024-03-15 ENCOUNTER — LAB (OUTPATIENT)
Dept: LAB | Facility: LAB | Age: 64
End: 2024-03-15
Payer: MEDICARE

## 2024-03-15 DIAGNOSIS — Z79.01 LONG TERM (CURRENT) USE OF ANTICOAGULANTS: ICD-10-CM

## 2024-03-15 LAB
INR PPP: 2.3 (ref 0.9–1.1)
PROTHROMBIN TIME: 25.9 SECONDS (ref 9.8–12.8)

## 2024-03-15 PROCEDURE — 85610 PROTHROMBIN TIME: CPT

## 2024-03-15 PROCEDURE — 36415 COLL VENOUS BLD VENIPUNCTURE: CPT

## 2024-03-20 ENCOUNTER — APPOINTMENT (OUTPATIENT)
Dept: ORTHOPEDIC SURGERY | Facility: CLINIC | Age: 64
End: 2024-03-20
Payer: MEDICARE

## 2024-03-27 ENCOUNTER — APPOINTMENT (OUTPATIENT)
Dept: ORTHOPEDIC SURGERY | Facility: CLINIC | Age: 64
End: 2024-03-27
Payer: MEDICARE
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
## 2024-03-28 PROBLEM — K59.03 DRUG-INDUCED CONSTIPATION: Status: ACTIVE | Noted: 2023-03-03

## 2024-04-01 ENCOUNTER — APPOINTMENT (OUTPATIENT)
Dept: NEUROLOGY | Facility: HOSPITAL | Age: 64
End: 2024-04-01
Payer: MEDICARE

## 2024-04-01 ENCOUNTER — OFFICE VISIT (OUTPATIENT)
Dept: ORTHOPEDIC SURGERY | Facility: CLINIC | Age: 64
End: 2024-04-01
Payer: MEDICARE

## 2024-04-01 VITALS — WEIGHT: 315 LBS | HEIGHT: 74 IN | BODY MASS INDEX: 40.43 KG/M2

## 2024-04-01 DIAGNOSIS — M79.641 RIGHT HAND PAIN: Primary | ICD-10-CM

## 2024-04-01 DIAGNOSIS — M65.351 TRIGGER LITTLE FINGER OF RIGHT HAND: ICD-10-CM

## 2024-04-01 PROCEDURE — 99203 OFFICE O/P NEW LOW 30 MIN: CPT | Performed by: ORTHOPAEDIC SURGERY

## 2024-04-01 PROCEDURE — 20550 NJX 1 TENDON SHEATH/LIGAMENT: CPT | Performed by: ORTHOPAEDIC SURGERY

## 2024-04-01 PROCEDURE — 3060F POS MICROALBUMINURIA REV: CPT | Performed by: ORTHOPAEDIC SURGERY

## 2024-04-01 PROCEDURE — 4010F ACE/ARB THERAPY RXD/TAKEN: CPT | Performed by: ORTHOPAEDIC SURGERY

## 2024-04-01 PROCEDURE — 1036F TOBACCO NON-USER: CPT | Performed by: ORTHOPAEDIC SURGERY

## 2024-04-01 PROCEDURE — 3051F HG A1C>EQUAL 7.0%<8.0%: CPT | Performed by: ORTHOPAEDIC SURGERY

## 2024-04-01 RX ORDER — TRIAMCINOLONE ACETONIDE 40 MG/ML
5 INJECTION, SUSPENSION INTRA-ARTICULAR; INTRAMUSCULAR
Status: COMPLETED | OUTPATIENT
Start: 2024-04-01 | End: 2024-04-01

## 2024-04-01 RX ORDER — LIDOCAINE HYDROCHLORIDE 20 MG/ML
0.5 INJECTION, SOLUTION INFILTRATION; PERINEURAL
Status: COMPLETED | OUTPATIENT
Start: 2024-04-01 | End: 2024-04-01

## 2024-04-01 RX ADMIN — TRIAMCINOLONE ACETONIDE 5 MG: 40 INJECTION, SUSPENSION INTRA-ARTICULAR; INTRAMUSCULAR at 08:42

## 2024-04-01 RX ADMIN — LIDOCAINE HYDROCHLORIDE 0.5 ML: 20 INJECTION, SOLUTION INFILTRATION; PERINEURAL at 08:42

## 2024-04-01 NOTE — PROGRESS NOTES
Subjective    Patient ID: Nasir Sandoval is a 63 y.o. male.    Chief Complaint: Pain of the Right Little Finger (X3 MONTHS)     Last Surgery: No surgery found  Last Surgery Date: No surgery found    HPI  Patient is a 63-year-old right-hand-dominant male who comes in with approximately 3-month history of pain and locking in his right little finger.  He does not recall any recent trauma to his right hand.  He currently denies numbness and paresthesias.  He has not had any previous treatment.  He states that the finger will lock in flexion on a daily basis.    Objective   Ortho Exam  Patient is in no acute distress.  Exam of his right hand and wrist reveals his skin envelope is intact.  He is tender to palpation over the little finger A1 pulley.  The little finger was essentially locked in extension.  Any attempted flexion increase the pain in the right little finger.  He had no other areas of point tenderness.    Assessment/Plan   Encounter Diagnoses:  Right hand pain    Trigger little finger of right hand    Patient has clinical evidence of a right little finger trigger digit.  We discussed surgical versus conservative management.  He elected to undergo a Kenalog injection today.    Hand / UE Inj/Asp: R small A1 for trigger finger on 4/1/2024 8:42 AM  Indications: tendon swelling  Details: 25 G needle, volar approach  Medications: 5 mg triamcinolone acetonide 40 mg/mL; 0.5 mL lidocaine 20 mg/mL (2 %)  Outcome: tolerated well, no immediate complications  Procedure, treatment alternatives, risks and benefits explained, specific risks discussed. Consent was given by the patient. Immediately prior to procedure a time out was called to verify the correct patient, procedure, equipment, support staff and site/side marked as required. Patient was prepped and draped in the usual sterile fashion.       Patient was informed it will take approximately 1 week for the injection have an effect.  He may use his right hand is much as  he can tolerate.  He will follow-up as his symptoms dictate.

## 2024-04-04 ENCOUNTER — APPOINTMENT (OUTPATIENT)
Dept: CARDIOLOGY | Facility: CLINIC | Age: 64
End: 2024-04-04
Payer: MEDICARE

## 2024-04-05 PROBLEM — M79.641 PAIN OF RIGHT HAND: Status: ACTIVE | Noted: 2024-04-05

## 2024-04-10 ENCOUNTER — LAB (OUTPATIENT)
Dept: LAB | Facility: LAB | Age: 64
End: 2024-04-10
Payer: MEDICARE

## 2024-04-10 DIAGNOSIS — Z79.01 LONG TERM (CURRENT) USE OF ANTICOAGULANTS: ICD-10-CM

## 2024-04-10 LAB
INR PPP: 1.9 (ref 0.9–1.1)
PROTHROMBIN TIME: 21.3 SECONDS (ref 9.8–12.8)

## 2024-04-10 PROCEDURE — 36415 COLL VENOUS BLD VENIPUNCTURE: CPT

## 2024-04-10 PROCEDURE — 85610 PROTHROMBIN TIME: CPT

## 2024-04-16 ENCOUNTER — OFFICE VISIT (OUTPATIENT)
Dept: CARDIOLOGY | Facility: CLINIC | Age: 64
End: 2024-04-16
Payer: COMMERCIAL

## 2024-04-16 VITALS
HEIGHT: 74 IN | HEART RATE: 93 BPM | DIASTOLIC BLOOD PRESSURE: 80 MMHG | OXYGEN SATURATION: 97 % | BODY MASS INDEX: 40.43 KG/M2 | SYSTOLIC BLOOD PRESSURE: 130 MMHG | WEIGHT: 315 LBS

## 2024-04-16 DIAGNOSIS — S05.71XD: ICD-10-CM

## 2024-04-16 DIAGNOSIS — I10 HYPERTENSION, UNSPECIFIED TYPE: ICD-10-CM

## 2024-04-16 DIAGNOSIS — G47.33 OBSTRUCTIVE SLEEP APNEA OF ADULT: ICD-10-CM

## 2024-04-16 DIAGNOSIS — I77.810 DILATION OF THORACIC AORTA (CMS-HCC): ICD-10-CM

## 2024-04-16 DIAGNOSIS — R55 SYNCOPE, UNSPECIFIED SYNCOPE TYPE: ICD-10-CM

## 2024-04-16 DIAGNOSIS — I48.19 PERSISTENT ATRIAL FIBRILLATION (MULTI): ICD-10-CM

## 2024-04-16 DIAGNOSIS — I50.32 CHRONIC HEART FAILURE WITH PRESERVED EJECTION FRACTION (MULTI): Primary | ICD-10-CM

## 2024-04-16 DIAGNOSIS — E11.9 TYPE 2 DIABETES MELLITUS WITHOUT COMPLICATION, UNSPECIFIED WHETHER LONG TERM INSULIN USE (MULTI): ICD-10-CM

## 2024-04-16 PROCEDURE — 4010F ACE/ARB THERAPY RXD/TAKEN: CPT | Performed by: INTERNAL MEDICINE

## 2024-04-16 PROCEDURE — 3051F HG A1C>EQUAL 7.0%<8.0%: CPT | Performed by: INTERNAL MEDICINE

## 2024-04-16 PROCEDURE — 3078F DIAST BP <80 MM HG: CPT | Performed by: INTERNAL MEDICINE

## 2024-04-16 PROCEDURE — 99214 OFFICE O/P EST MOD 30 MIN: CPT | Performed by: INTERNAL MEDICINE

## 2024-04-16 PROCEDURE — 3060F POS MICROALBUMINURIA REV: CPT | Performed by: INTERNAL MEDICINE

## 2024-04-16 PROCEDURE — 3075F SYST BP GE 130 - 139MM HG: CPT | Performed by: INTERNAL MEDICINE

## 2024-04-16 NOTE — PROGRESS NOTES
"  Subjective  Nasir Sandoval  is a 63 y.o. year old male who presents for F/U hypotension after stopping spironolactone.  Coughing from Lisinopril but tolerating it well  Intolerant of Jardiance.    Blood pressure 130/80, pulse 93, height 1.88 m (6' 2\"), weight (!) 166 kg (367 lb), SpO2 97%.   Penicillins  Past Medical History:   Diagnosis Date    VALERIANO (acute kidney injury) (CMS-HCC) 05/15/2012    Avulsion of left eye, initial encounter 11/30/2018    Traumatic enucleation of left eye    Avulsion of left eye, initial encounter 11/30/2018    Traumatic enucleation of left eye    Carcinoma in situ of prostate 10/31/2023    Epidermal cyst 10/31/2023    Esophageal dysphagia 10/31/2023    Heart failure (Multi) 10/31/2023    Heart failure, unspecified (Multi) 10/31/2023    Hypotension 03/03/2023    Ingrowing toenail 10/31/2023    Nephrolithiasis 05/15/2012    Pain, unspecified 10/31/2023    Recurrent major depression (CMS-HCC) 10/31/2023    Uric acid urolithiasis 10/31/2023    Vitreous degeneration, left eye 11/30/2018    PVD (posterior vitreous detachment), left eye    Vitreous degeneration, left eye 11/30/2018    PVD (posterior vitreous detachment), left eye     History reviewed. No pertinent surgical history.  No family history on file.  @SOC    Current Outpatient Medications   Medication Sig Dispense Refill    albuterol 90 mcg/actuation inhaler Inhale 2 puffs every 6 hours if needed for shortness of breath.      ammonium lactate (Amlactin) 12 % cream APPLY A SMALL AMOUNT EXTERNALLY TWICE A DAY TO AFFECTED AREAS OF DRY SKIN AS NEEDED      bacitracin 500 unit/gram ointment APPLY A SUFFICIENT AMOUNT EXTERNALLY TWICE A DAY AS NEEDED AS DIRECTED      carboxymethylcellulose (Refresh Plus) 0.5 % ophthalmic solution Administer 1 drop into both eyes 3 times a day as needed for dry eyes.      cetirizine (ZyrTEC) 5 mg tablet Take 1 tablet (5 mg) by mouth once daily.      cholecalciferol (Vitamin D-3) 25 MCG (1000 UT) tablet Take 2 " tablets (50 mcg) by mouth once daily.      clindamycin (Cleocin T) 1 % external solution APPLY A SUFFICIENT AMOUNT EXTERNALLY TWICE A DAY TO SCROTUM AS NEEDED FOR LESION      cyclobenzaprine (Flexeril) 5 mg tablet Take 1 tablet (5 mg) by mouth 2 times a day as needed for muscle spasms for up to 5 days. 10 tablet 0    digoxin (Lanoxin) 125 MCG tablet Take 1 tablet (125 mcg) by mouth once daily.      docosahexaenoic acid/epa (FISH OIL ORAL) Take 1 capsule by mouth 2 times a day.      docusate sodium (Colace) 100 mg capsule Take 2 capsules (200 mg) by mouth 2 times a day.      DULoxetine (Cymbalta) 60 mg DR capsule Take 1 capsule (60 mg) by mouth once daily.      furosemide (Lasix) 40 mg tablet Take 3 tablets (120 mg) by mouth 2 times a day.      gabapentin (Neurontin) 400 mg capsule Take 2 capsules (800 mg) by mouth 3 times a day.      gabapentin (Neurontin) 800 mg tablet Take 1 tablet (800 mg) by mouth 3 times a day. 90 tablet 11    glipiZIDE (Glucotrol) 10 mg tablet Take 1 tablet (10 mg) by mouth 2 times a day before meals.      ketoconazole (NIZOral) 2 % shampoo SHAMPOO A SUFFICIENT AMOUNT EXTERNALLY EVERY OTHER DAY .  LATHER ONTO THE SCALP, FACE, BEHIND EARS FOR 5 MINUTES EVERY OTHER DAY  THEN RINSE .  LATHER ONTO THE SCALP, FACE, BEHIND EARS FOR 5 MINUTES EVERY OTHER DAY   THEN RINSE      KRILL OIL ORAL Take by mouth every 12 hours.      lisinopril 2.5 mg tablet Take 1 tablet (2.5 mg) by mouth once daily.      magnesium oxide (Mag-Ox) 400 mg (241.3 mg magnesium) tablet Take 1 tablet (400 mg) by mouth twice a day.      multivitamin tablet Take 1 tablet by mouth once daily.      omega 3-dha-epa-fish oil (Fish OiL) 1,000 mg (120 mg-180 mg) capsule Take 1 capsule (1,000 mg) by mouth 2 times a day.      potassium chloride CR (Klor-Con M20) 20 mEq ER tablet Take 2 tablets (40 mEq) by mouth 2 times a day.      potassium citrate CR (Urocit-K-5) 5 mEq ER tablet Take 3 tablets (15 mEq) by mouth once daily at bedtime. Do  not crush, chew, or split.      psyllium (Metamucil) 3.4 gram packet Take by mouth.      psyllium husk (METAMUCIL ORAL) Take 1 Scoop by mouth once daily.      rosuvastatin (Crestor) 20 mg tablet Take 1 tablet (20 mg) by mouth once daily at bedtime.      simvastatin (Zocor) 10 mg tablet Take 1 tablet (10 mg) by mouth once daily at bedtime.      tamsulosin (Flomax) 0.4 mg 24 hr capsule Take 2 capsules (0.8 mg) by mouth once daily at bedtime.      topiramate (Topamax) 50 mg tablet Take 1 tablet (50 mg) by mouth 2 times a day. 60 tablet 11    vitamin D3-vitamin K2, MK4, 1,000-100 unit-mcg tablet Take by mouth.      warfarin (Coumadin) 10 mg tablet Take 1 tablet (10 mg) by mouth once daily.      warfarin (Coumadin) 5 mg tablet Take by mouth. Take 2.5 tabs 6 times weekly and 2 tabs once weekly on Tuesday       No current facility-administered medications for this visit.        ROS  Review of Systems   All other systems reviewed and are negative.      Physical Exam  Physical Exam  Constitutional:       Appearance: He is obese.   HENT:      Head: Normocephalic and atraumatic.   Cardiovascular:      Rate and Rhythm: Normal rate. Rhythm irregularly irregular.   Pulmonary:      Effort: Pulmonary effort is normal.      Breath sounds: Normal breath sounds.   Skin:     General: Skin is warm and dry.   Neurological:      General: No focal deficit present.      Mental Status: He is alert and oriented to person, place, and time.   Psychiatric:         Mood and Affect: Mood normal.         Behavior: Behavior normal.          EKG  Encounter Date: 02/20/24   ECG 12 lead   Result Value    Ventricular Rate 98    QRS Duration 104    QT Interval 374    QTC Calculation(Bazett) 477    R Axis -69    T Axis 84    QRS Count 17    Q Onset 204    T Offset 391    QTC Fredericia 440    Narrative    Atrial fibrillation  Left anterior fascicular block  Cannot rule out Inferior infarct , age undetermined  Abnormal ECG  When compared with ECG of  12-SEP-2021 10:13,  PREVIOUS ECG IS PRESENT  See ED provider note for full interpretation and clinical correlation  Confirmed by Kim Sepulveda ([id number]) on 2/24/2024 7:16:00 PM       Problem List Items Addressed This Visit       (HFpEF) heart failure with preserved ejection fraction (Multi) - Primary    A-fib (Multi)    Relevant Orders    Follow Up In Cardiology    Follow Up In Cardiology    Hypertension    Traumatic enucleation of right eye    Obstructive sleep apnea of adult    Diabetes mellitus (Multi)    Syncope     Admitted [facility] 2/20/24 wot 2/21/24 echocardiogram LVEF = 45%, moderate concentric LVH, moderate LAE, mild to moderate thoracic aortic dilation         Dilation of thoracic aorta (CMS-HCC)         Resume spironolactone      Dutch Milan MD

## 2024-04-19 PROBLEM — I77.810 DILATION OF THORACIC AORTA (CMS-HCC): Status: ACTIVE | Noted: 2024-04-19

## 2024-04-19 NOTE — ASSESSMENT & PLAN NOTE
Admitted [facility] 2/20/24 wot 2/21/24 echocardiogram LVEF = 45%, moderate concentric LVH, moderate LAE, mild to moderate thoracic aortic dilation

## 2024-04-25 ENCOUNTER — LAB (OUTPATIENT)
Dept: LAB | Facility: LAB | Age: 64
End: 2024-04-25
Payer: COMMERCIAL

## 2024-04-25 DIAGNOSIS — Z79.01 LONG TERM (CURRENT) USE OF ANTICOAGULANTS: Primary | ICD-10-CM

## 2024-04-25 LAB
INR PPP: 2.2 (ref 0.9–1.1)
PROTHROMBIN TIME: 25.3 SECONDS (ref 9.8–12.8)

## 2024-04-25 PROCEDURE — 85610 PROTHROMBIN TIME: CPT

## 2024-04-25 PROCEDURE — 36415 COLL VENOUS BLD VENIPUNCTURE: CPT

## 2024-04-30 ENCOUNTER — APPOINTMENT (OUTPATIENT)
Dept: PRIMARY CARE | Facility: CLINIC | Age: 64
End: 2024-04-30
Payer: MEDICARE

## 2024-05-14 ENCOUNTER — APPOINTMENT (OUTPATIENT)
Dept: CARDIOLOGY | Facility: CLINIC | Age: 64
End: 2024-05-14
Payer: MEDICARE

## 2024-05-17 ENCOUNTER — LAB (OUTPATIENT)
Dept: LAB | Facility: LAB | Age: 64
End: 2024-05-17
Payer: MEDICARE

## 2024-05-17 DIAGNOSIS — Z79.01 LONG TERM (CURRENT) USE OF ANTICOAGULANTS: Primary | ICD-10-CM

## 2024-05-17 LAB
INR PPP: 2.3 (ref 0.9–1.1)
PROTHROMBIN TIME: 25.7 SECONDS (ref 9.8–12.8)

## 2024-05-17 PROCEDURE — 36415 COLL VENOUS BLD VENIPUNCTURE: CPT

## 2024-05-17 PROCEDURE — 85610 PROTHROMBIN TIME: CPT

## 2024-05-20 ENCOUNTER — APPOINTMENT (OUTPATIENT)
Dept: CARDIOLOGY | Facility: CLINIC | Age: 64
End: 2024-05-20
Payer: MEDICARE

## 2024-05-30 ENCOUNTER — HOSPITAL ENCOUNTER (OUTPATIENT)
Dept: CARDIOLOGY | Facility: HOSPITAL | Age: 64
Discharge: HOME | DRG: 194 | End: 2024-05-30
Payer: MEDICARE

## 2024-05-30 ENCOUNTER — APPOINTMENT (OUTPATIENT)
Dept: RADIOLOGY | Facility: HOSPITAL | Age: 64
DRG: 194 | End: 2024-05-30
Payer: MEDICARE

## 2024-05-30 ENCOUNTER — HOSPITAL ENCOUNTER (INPATIENT)
Facility: HOSPITAL | Age: 64
LOS: 3 days | Discharge: HOME | DRG: 194 | End: 2024-06-02
Attending: STUDENT IN AN ORGANIZED HEALTH CARE EDUCATION/TRAINING PROGRAM | Admitting: PHYSICIAN ASSISTANT
Payer: MEDICARE

## 2024-05-30 ENCOUNTER — APPOINTMENT (OUTPATIENT)
Dept: CARDIOLOGY | Facility: HOSPITAL | Age: 64
DRG: 194 | End: 2024-05-30
Payer: MEDICARE

## 2024-05-30 ENCOUNTER — OFFICE VISIT (OUTPATIENT)
Dept: CARDIOLOGY | Facility: CLINIC | Age: 64
End: 2024-05-30
Payer: MEDICARE

## 2024-05-30 DIAGNOSIS — I50.32 CHRONIC HEART FAILURE WITH PRESERVED EJECTION FRACTION (MULTI): ICD-10-CM

## 2024-05-30 DIAGNOSIS — Z96.1 PSEUDOPHAKIA OF LEFT EYE: ICD-10-CM

## 2024-05-30 DIAGNOSIS — G47.33 OBSTRUCTIVE SLEEP APNEA OF ADULT: ICD-10-CM

## 2024-05-30 DIAGNOSIS — K59.03 DRUG-INDUCED CONSTIPATION: ICD-10-CM

## 2024-05-30 DIAGNOSIS — R42 DIZZINESS: ICD-10-CM

## 2024-05-30 DIAGNOSIS — F40.01 PANIC DISORDER WITH AGORAPHOBIA: ICD-10-CM

## 2024-05-30 DIAGNOSIS — J20.9 ACUTE BRONCHITIS, UNSPECIFIED ORGANISM: ICD-10-CM

## 2024-05-30 DIAGNOSIS — J18.9 PNEUMONIA DUE TO INFECTIOUS ORGANISM, UNSPECIFIED LATERALITY, UNSPECIFIED PART OF LUNG: ICD-10-CM

## 2024-05-30 DIAGNOSIS — E61.1 IRON DEFICIENCY: ICD-10-CM

## 2024-05-30 DIAGNOSIS — N13.2 URETERAL STONE WITH HYDRONEPHROSIS: ICD-10-CM

## 2024-05-30 DIAGNOSIS — G62.9 NEUROPATHY: ICD-10-CM

## 2024-05-30 DIAGNOSIS — F60.3: ICD-10-CM

## 2024-05-30 DIAGNOSIS — E78.2 COMBINED HYPERLIPIDEMIA: ICD-10-CM

## 2024-05-30 DIAGNOSIS — F10.10 CHRONIC ALCOHOL ABUSE: ICD-10-CM

## 2024-05-30 DIAGNOSIS — R05.1 ACUTE COUGH: ICD-10-CM

## 2024-05-30 DIAGNOSIS — H33.22 LEFT RETINAL DETACHMENT: ICD-10-CM

## 2024-05-30 DIAGNOSIS — R21 RASH AND NONSPECIFIC SKIN ERUPTION: ICD-10-CM

## 2024-05-30 DIAGNOSIS — E11.9 TYPE 2 DIABETES MELLITUS WITHOUT COMPLICATION, WITHOUT LONG-TERM CURRENT USE OF INSULIN (MULTI): ICD-10-CM

## 2024-05-30 DIAGNOSIS — S05.71XS: ICD-10-CM

## 2024-05-30 DIAGNOSIS — R09.02 HYPOXEMIA: ICD-10-CM

## 2024-05-30 DIAGNOSIS — H43.11 VITREOUS HEMORRHAGE OF RIGHT EYE (MULTI): ICD-10-CM

## 2024-05-30 DIAGNOSIS — F60.81: ICD-10-CM

## 2024-05-30 DIAGNOSIS — H04.123 DRY EYES, BILATERAL: ICD-10-CM

## 2024-05-30 DIAGNOSIS — R73.9 HYPERGLYCEMIA: ICD-10-CM
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
## 2024-05-30 DIAGNOSIS — U07.1 DISEASE DUE TO SEVERE ACUTE RESPIRATORY SYNDROME CORONAVIRUS 2 (SARS-COV-2): ICD-10-CM

## 2024-05-30 DIAGNOSIS — K64.8 INTERNAL HEMORRHOIDS: ICD-10-CM

## 2024-05-30 DIAGNOSIS — I10 HYPERTENSION, UNSPECIFIED TYPE: ICD-10-CM

## 2024-05-30 DIAGNOSIS — E66.01 MORBID OBESITY (MULTI): ICD-10-CM

## 2024-05-30 DIAGNOSIS — I77.810 DILATION OF THORACIC AORTA (CMS-HCC): ICD-10-CM

## 2024-05-30 DIAGNOSIS — I48.20 CHRONIC A-FIB (MULTI): ICD-10-CM

## 2024-05-30 DIAGNOSIS — R06.02 SHORTNESS OF BREATH: Primary | ICD-10-CM

## 2024-05-30 DIAGNOSIS — F43.10 POSTTRAUMATIC STRESS DISORDER: ICD-10-CM

## 2024-05-30 DIAGNOSIS — F19.10 DRUG ABUSE (MULTI): ICD-10-CM

## 2024-05-30 DIAGNOSIS — M79.641 PAIN OF RIGHT HAND: ICD-10-CM

## 2024-05-30 DIAGNOSIS — I50.32 CHRONIC DIASTOLIC (CONGESTIVE) HEART FAILURE (MULTI): ICD-10-CM

## 2024-05-30 DIAGNOSIS — K22.0 ACHALASIA, ESOPHAGEAL: ICD-10-CM

## 2024-05-30 DIAGNOSIS — J22 ACUTE LOWER RESPIRATORY INFECTION: Primary | ICD-10-CM

## 2024-05-30 DIAGNOSIS — E55.9 VITAMIN D DEFICIENCY: ICD-10-CM

## 2024-05-30 DIAGNOSIS — I48.19 PERSISTENT ATRIAL FIBRILLATION (MULTI): ICD-10-CM

## 2024-05-30 DIAGNOSIS — F33.41 MAJOR DEPRESSIVE DISORDER, RECURRENT, IN PARTIAL REMISSION (CMS-HCC): ICD-10-CM

## 2024-05-30 DIAGNOSIS — I24.9 ACS (ACUTE CORONARY SYNDROME) (MULTI): ICD-10-CM

## 2024-05-30 LAB
ALBUMIN SERPL BCP-MCNC: 4.3 G/DL (ref 3.4–5)
ALP SERPL-CCNC: 68 U/L (ref 33–136)
ALT SERPL W P-5'-P-CCNC: 28 U/L (ref 10–52)
ANION GAP BLDV CALCULATED.4IONS-SCNC: 14 MMOL/L (ref 10–25)
ANION GAP SERPL CALC-SCNC: 14 MMOL/L (ref 10–20)
APTT PPP: 41 SECONDS (ref 27–38)
AST SERPL W P-5'-P-CCNC: 21 U/L (ref 9–39)
BASE EXCESS BLDV CALC-SCNC: 2.9 MMOL/L (ref -2–3)
BASOPHILS # BLD AUTO: 0.04 X10*3/UL (ref 0–0.1)
BASOPHILS NFR BLD AUTO: 0.5 %
BILIRUB SERPL-MCNC: 0.4 MG/DL (ref 0–1.2)
BNP SERPL-MCNC: 18 PG/ML (ref 0–99)
BODY TEMPERATURE: 37 DEGREES CELSIUS
BUN SERPL-MCNC: 25 MG/DL (ref 6–23)
CA-I BLDV-SCNC: 1.15 MMOL/L (ref 1.1–1.33)
CALCIUM SERPL-MCNC: 9.4 MG/DL (ref 8.6–10.3)
CARDIAC TROPONIN I PNL SERPL HS: 11 NG/L (ref 0–20)
CHLORIDE BLDV-SCNC: 99 MMOL/L (ref 98–107)
CHLORIDE SERPL-SCNC: 101 MMOL/L (ref 98–107)
CO2 SERPL-SCNC: 28 MMOL/L (ref 21–32)
CREAT SERPL-MCNC: 1.17 MG/DL (ref 0.5–1.3)
EGFRCR SERPLBLD CKD-EPI 2021: 70 ML/MIN/1.73M*2
EOSINOPHIL # BLD AUTO: 0.2 X10*3/UL (ref 0–0.7)
EOSINOPHIL NFR BLD AUTO: 2.3 %
ERYTHROCYTE [DISTWIDTH] IN BLOOD BY AUTOMATED COUNT: 14.9 % (ref 11.5–14.5)
FLUAV RNA RESP QL NAA+PROBE: NOT DETECTED
FLUBV RNA RESP QL NAA+PROBE: NOT DETECTED
GLUCOSE BLD MANUAL STRIP-MCNC: 189 MG/DL (ref 74–99)
GLUCOSE BLD MANUAL STRIP-MCNC: 220 MG/DL (ref 74–99)
GLUCOSE BLDV-MCNC: 165 MG/DL (ref 74–99)
GLUCOSE SERPL-MCNC: 159 MG/DL (ref 74–99)
HCO3 BLDV-SCNC: 29 MMOL/L (ref 22–26)
HCT VFR BLD AUTO: 41.9 % (ref 41–52)
HCT VFR BLD EST: 43 % (ref 41–52)
HGB BLD-MCNC: 13.9 G/DL (ref 13.5–17.5)
HGB BLDV-MCNC: 14.4 G/DL (ref 13.5–17.5)
IMM GRANULOCYTES # BLD AUTO: 0.08 X10*3/UL (ref 0–0.7)
IMM GRANULOCYTES NFR BLD AUTO: 0.9 % (ref 0–0.9)
INHALED O2 CONCENTRATION: 21 %
INR PPP: 1.9 (ref 0.9–1.1)
LACTATE BLDV-SCNC: 2 MMOL/L (ref 0.4–2)
LYMPHOCYTES # BLD AUTO: 1.46 X10*3/UL (ref 1.2–4.8)
LYMPHOCYTES NFR BLD AUTO: 17 %
MAGNESIUM SERPL-MCNC: 2.03 MG/DL (ref 1.6–2.4)
MCH RBC QN AUTO: 31.4 PG (ref 26–34)
MCHC RBC AUTO-ENTMCNC: 33.2 G/DL (ref 32–36)
MCV RBC AUTO: 95 FL (ref 80–100)
MONOCYTES # BLD AUTO: 0.77 X10*3/UL (ref 0.1–1)
MONOCYTES NFR BLD AUTO: 9 %
NEUTROPHILS # BLD AUTO: 6.04 X10*3/UL (ref 1.2–7.7)
NEUTROPHILS NFR BLD AUTO: 70.3 %
NRBC BLD-RTO: 0 /100 WBCS (ref 0–0)
OXYHGB MFR BLDV: 60.7 % (ref 45–75)
PCO2 BLDV: 49 MM HG (ref 41–51)
PH BLDV: 7.38 PH (ref 7.33–7.43)
PLATELET # BLD AUTO: 180 X10*3/UL (ref 150–450)
PO2 BLDV: 37 MM HG (ref 35–45)
POTASSIUM BLDV-SCNC: 4.1 MMOL/L (ref 3.5–5.3)
POTASSIUM SERPL-SCNC: 3.9 MMOL/L (ref 3.5–5.3)
PROT SERPL-MCNC: 8 G/DL (ref 6.4–8.2)
PROTHROMBIN TIME: 22.1 SECONDS (ref 9.8–12.8)
RBC # BLD AUTO: 4.42 X10*6/UL (ref 4.5–5.9)
RSV RNA RESP QL NAA+PROBE: NOT DETECTED
SAO2 % BLDV: 62 % (ref 45–75)
SARS-COV-2 RNA RESP QL NAA+PROBE: NOT DETECTED
SODIUM BLDV-SCNC: 138 MMOL/L (ref 136–145)
SODIUM SERPL-SCNC: 139 MMOL/L (ref 136–145)
WBC # BLD AUTO: 8.6 X10*3/UL (ref 4.4–11.3)

## 2024-05-30 PROCEDURE — 3060F POS MICROALBUMINURIA REV: CPT | Performed by: INTERNAL MEDICINE

## 2024-05-30 PROCEDURE — 4010F ACE/ARB THERAPY RXD/TAKEN: CPT | Performed by: INTERNAL MEDICINE

## 2024-05-30 PROCEDURE — 84132 ASSAY OF SERUM POTASSIUM: CPT | Mod: 91 | Performed by: STUDENT IN AN ORGANIZED HEALTH CARE EDUCATION/TRAINING PROGRAM

## 2024-05-30 PROCEDURE — 3051F HG A1C>EQUAL 7.0%<8.0%: CPT | Performed by: INTERNAL MEDICINE

## 2024-05-30 PROCEDURE — 84484 ASSAY OF TROPONIN QUANT: CPT | Performed by: STUDENT IN AN ORGANIZED HEALTH CARE EDUCATION/TRAINING PROGRAM

## 2024-05-30 PROCEDURE — 2500000001 HC RX 250 WO HCPCS SELF ADMINISTERED DRUGS (ALT 637 FOR MEDICARE OP): Performed by: PHYSICIAN ASSISTANT

## 2024-05-30 PROCEDURE — 3079F DIAST BP 80-89 MM HG: CPT | Performed by: INTERNAL MEDICINE

## 2024-05-30 PROCEDURE — 93005 ELECTROCARDIOGRAM TRACING: CPT

## 2024-05-30 PROCEDURE — 99285 EMERGENCY DEPT VISIT HI MDM: CPT | Mod: 25

## 2024-05-30 PROCEDURE — 87636 SARSCOV2 & INF A&B AMP PRB: CPT | Performed by: STUDENT IN AN ORGANIZED HEALTH CARE EDUCATION/TRAINING PROGRAM

## 2024-05-30 PROCEDURE — 99215 OFFICE O/P EST HI 40 MIN: CPT | Performed by: INTERNAL MEDICINE

## 2024-05-30 PROCEDURE — 3077F SYST BP >= 140 MM HG: CPT | Performed by: INTERNAL MEDICINE

## 2024-05-30 PROCEDURE — 1200000002 HC GENERAL ROOM WITH TELEMETRY DAILY

## 2024-05-30 PROCEDURE — 87634 RSV DNA/RNA AMP PROBE: CPT | Performed by: STUDENT IN AN ORGANIZED HEALTH CARE EDUCATION/TRAINING PROGRAM

## 2024-05-30 PROCEDURE — 36415 COLL VENOUS BLD VENIPUNCTURE: CPT | Performed by: STUDENT IN AN ORGANIZED HEALTH CARE EDUCATION/TRAINING PROGRAM

## 2024-05-30 PROCEDURE — 71046 X-RAY EXAM CHEST 2 VIEWS: CPT | Performed by: RADIOLOGY

## 2024-05-30 PROCEDURE — 1036F TOBACCO NON-USER: CPT | Performed by: INTERNAL MEDICINE

## 2024-05-30 PROCEDURE — 96365 THER/PROPH/DIAG IV INF INIT: CPT | Mod: 59

## 2024-05-30 PROCEDURE — 82947 ASSAY GLUCOSE BLOOD QUANT: CPT

## 2024-05-30 PROCEDURE — 71046 X-RAY EXAM CHEST 2 VIEWS: CPT

## 2024-05-30 PROCEDURE — 93000 ELECTROCARDIOGRAM COMPLETE: CPT | Performed by: INTERNAL MEDICINE

## 2024-05-30 PROCEDURE — 85025 COMPLETE CBC W/AUTO DIFF WBC: CPT | Performed by: STUDENT IN AN ORGANIZED HEALTH CARE EDUCATION/TRAINING PROGRAM

## 2024-05-30 PROCEDURE — 87040 BLOOD CULTURE FOR BACTERIA: CPT | Mod: PARLAB | Performed by: STUDENT IN AN ORGANIZED HEALTH CARE EDUCATION/TRAINING PROGRAM

## 2024-05-30 PROCEDURE — 2500000004 HC RX 250 GENERAL PHARMACY W/ HCPCS (ALT 636 FOR OP/ED): Performed by: PHYSICIAN ASSISTANT

## 2024-05-30 PROCEDURE — 83880 ASSAY OF NATRIURETIC PEPTIDE: CPT | Performed by: STUDENT IN AN ORGANIZED HEALTH CARE EDUCATION/TRAINING PROGRAM

## 2024-05-30 PROCEDURE — 85610 PROTHROMBIN TIME: CPT | Performed by: PHYSICIAN ASSISTANT

## 2024-05-30 PROCEDURE — 2500000002 HC RX 250 W HCPCS SELF ADMINISTERED DRUGS (ALT 637 FOR MEDICARE OP, ALT 636 FOR OP/ED): Performed by: PHYSICIAN ASSISTANT

## 2024-05-30 PROCEDURE — 83735 ASSAY OF MAGNESIUM: CPT | Performed by: STUDENT IN AN ORGANIZED HEALTH CARE EDUCATION/TRAINING PROGRAM

## 2024-05-30 PROCEDURE — 2500000001 HC RX 250 WO HCPCS SELF ADMINISTERED DRUGS (ALT 637 FOR MEDICARE OP): Performed by: INTERNAL MEDICINE

## 2024-05-30 PROCEDURE — 2500000004 HC RX 250 GENERAL PHARMACY W/ HCPCS (ALT 636 FOR OP/ED): Performed by: STUDENT IN AN ORGANIZED HEALTH CARE EDUCATION/TRAINING PROGRAM

## 2024-05-30 RX ORDER — MULTIVIT-MIN/IRON FUM/FOLIC AC 7.5 MG-4
1 TABLET ORAL DAILY
Status: DISCONTINUED | OUTPATIENT
Start: 2024-05-30 | End: 2024-06-02 | Stop reason: HOSPADM

## 2024-05-30 RX ORDER — ROSUVASTATIN CALCIUM 10 MG/1
20 TABLET, COATED ORAL NIGHTLY
Status: DISCONTINUED | OUTPATIENT
Start: 2024-05-30 | End: 2024-06-02 | Stop reason: HOSPADM

## 2024-05-30 RX ORDER — FUROSEMIDE 40 MG/1
120 TABLET ORAL 2 TIMES DAILY
Status: DISCONTINUED | OUTPATIENT
Start: 2024-05-30 | End: 2024-06-02 | Stop reason: HOSPADM

## 2024-05-30 RX ORDER — CEFTRIAXONE 2 G/50ML
2 INJECTION, SOLUTION INTRAVENOUS EVERY 24 HOURS
Status: DISCONTINUED | OUTPATIENT
Start: 2024-05-30 | End: 2024-06-02 | Stop reason: HOSPADM

## 2024-05-30 RX ORDER — DIGOXIN 125 MCG
125 TABLET ORAL DAILY
Status: DISCONTINUED | OUTPATIENT
Start: 2024-05-30 | End: 2024-06-02 | Stop reason: HOSPADM

## 2024-05-30 RX ORDER — CEFTRIAXONE 2 G/50ML
2 INJECTION, SOLUTION INTRAVENOUS EVERY 24 HOURS
Status: DISCONTINUED | OUTPATIENT
Start: 2024-05-31 | End: 2024-05-30

## 2024-05-30 RX ORDER — IPRATROPIUM BROMIDE AND ALBUTEROL SULFATE 2.5; .5 MG/3ML; MG/3ML
3 SOLUTION RESPIRATORY (INHALATION)
Status: DISCONTINUED | OUTPATIENT
Start: 2024-05-30 | End: 2024-05-30

## 2024-05-30 RX ORDER — ACETAMINOPHEN 325 MG/1
650 TABLET ORAL EVERY 4 HOURS PRN
Status: DISCONTINUED | OUTPATIENT
Start: 2024-05-30 | End: 2024-06-02 | Stop reason: HOSPADM

## 2024-05-30 RX ORDER — INSULIN LISPRO 100 [IU]/ML
0-10 INJECTION, SOLUTION INTRAVENOUS; SUBCUTANEOUS
Status: DISCONTINUED | OUTPATIENT
Start: 2024-05-30 | End: 2024-06-02 | Stop reason: HOSPADM

## 2024-05-30 RX ORDER — METFORMIN HYDROCHLORIDE 1000 MG/1
1000 TABLET ORAL
COMMUNITY

## 2024-05-30 RX ORDER — DEXTROSE 50 % IN WATER (D50W) INTRAVENOUS SYRINGE
25
Status: DISCONTINUED | OUTPATIENT
Start: 2024-05-30 | End: 2024-06-02 | Stop reason: HOSPADM

## 2024-05-30 RX ORDER — ACETAMINOPHEN 160 MG/5ML
650 SOLUTION ORAL EVERY 4 HOURS PRN
Status: DISCONTINUED | OUTPATIENT
Start: 2024-05-30 | End: 2024-06-02 | Stop reason: HOSPADM

## 2024-05-30 RX ORDER — ALBUTEROL SULFATE 0.83 MG/ML
3 SOLUTION RESPIRATORY (INHALATION) EVERY 2 HOUR PRN
Status: DISCONTINUED | OUTPATIENT
Start: 2024-05-30 | End: 2024-06-02 | Stop reason: HOSPADM

## 2024-05-30 RX ORDER — DOCUSATE SODIUM 100 MG/1
200 CAPSULE, LIQUID FILLED ORAL EVERY MORNING
Status: DISCONTINUED | OUTPATIENT
Start: 2024-05-31 | End: 2024-06-02 | Stop reason: HOSPADM

## 2024-05-30 RX ORDER — CHOLECALCIFEROL (VITAMIN D3) 25 MCG
1000 TABLET ORAL DAILY
Status: DISCONTINUED | OUTPATIENT
Start: 2024-05-30 | End: 2024-06-02 | Stop reason: HOSPADM

## 2024-05-30 RX ORDER — ONDANSETRON HYDROCHLORIDE 2 MG/ML
4 INJECTION, SOLUTION INTRAVENOUS EVERY 6 HOURS PRN
Status: DISCONTINUED | OUTPATIENT
Start: 2024-05-30 | End: 2024-06-02 | Stop reason: HOSPADM

## 2024-05-30 RX ORDER — IPRATROPIUM BROMIDE AND ALBUTEROL SULFATE 2.5; .5 MG/3ML; MG/3ML
3 SOLUTION RESPIRATORY (INHALATION) 3 TIMES DAILY
Status: DISCONTINUED | OUTPATIENT
Start: 2024-05-31 | End: 2024-05-31

## 2024-05-30 RX ORDER — DULOXETIN HYDROCHLORIDE 30 MG/1
60 CAPSULE, DELAYED RELEASE ORAL DAILY
Status: DISCONTINUED | OUTPATIENT
Start: 2024-05-30 | End: 2024-06-02 | Stop reason: HOSPADM

## 2024-05-30 RX ORDER — GABAPENTIN 400 MG/1
800 CAPSULE ORAL 3 TIMES DAILY
Status: DISCONTINUED | OUTPATIENT
Start: 2024-05-30 | End: 2024-06-02 | Stop reason: HOSPADM

## 2024-05-30 RX ORDER — ONDANSETRON 4 MG/1
4 TABLET, FILM COATED ORAL EVERY 6 HOURS PRN
Status: DISCONTINUED | OUTPATIENT
Start: 2024-05-30 | End: 2024-06-02 | Stop reason: HOSPADM

## 2024-05-30 RX ORDER — LISINOPRIL 5 MG/1
2.5 TABLET ORAL DAILY
Status: DISCONTINUED | OUTPATIENT
Start: 2024-05-30 | End: 2024-06-02 | Stop reason: HOSPADM

## 2024-05-30 RX ORDER — BACITRACIN 500 [USP'U]/G
1 OINTMENT TOPICAL 2 TIMES DAILY
Status: DISCONTINUED | OUTPATIENT
Start: 2024-05-30 | End: 2024-06-02 | Stop reason: HOSPADM

## 2024-05-30 RX ORDER — TOPIRAMATE 25 MG/1
50 TABLET ORAL 2 TIMES DAILY
Status: DISCONTINUED | OUTPATIENT
Start: 2024-05-30 | End: 2024-06-02 | Stop reason: HOSPADM

## 2024-05-30 RX ORDER — WARFARIN SODIUM 5 MG/1
10 TABLET ORAL
Status: DISCONTINUED | OUTPATIENT
Start: 2024-05-30 | End: 2024-06-02 | Stop reason: HOSPADM

## 2024-05-30 RX ORDER — DEXTROSE 50 % IN WATER (D50W) INTRAVENOUS SYRINGE
12.5
Status: DISCONTINUED | OUTPATIENT
Start: 2024-05-30 | End: 2024-06-02 | Stop reason: HOSPADM

## 2024-05-30 RX ORDER — AMMONIUM LACTATE 12 G/100G
1 LOTION TOPICAL AS NEEDED
Status: DISCONTINUED | OUTPATIENT
Start: 2024-05-30 | End: 2024-06-02 | Stop reason: HOSPADM

## 2024-05-30 RX ORDER — BENZONATATE 100 MG/1
100 CAPSULE ORAL 3 TIMES DAILY PRN
Status: DISCONTINUED | OUTPATIENT
Start: 2024-05-30 | End: 2024-06-02 | Stop reason: HOSPADM

## 2024-05-30 RX ORDER — TAMSULOSIN HYDROCHLORIDE 0.4 MG/1
0.8 CAPSULE ORAL NIGHTLY
Status: DISCONTINUED | OUTPATIENT
Start: 2024-05-30 | End: 2024-06-02 | Stop reason: HOSPADM

## 2024-05-30 RX ORDER — BISMUTH SUBSALICYLATE 262 MG
1 TABLET,CHEWABLE ORAL DAILY
Status: DISCONTINUED | OUTPATIENT
Start: 2024-05-30 | End: 2024-05-30 | Stop reason: SDUPTHER

## 2024-05-30 RX ADMIN — TOPIRAMATE 50 MG: 25 TABLET, FILM COATED ORAL at 23:49

## 2024-05-30 RX ADMIN — LISINOPRIL 2.5 MG: 5 TABLET ORAL at 18:39

## 2024-05-30 RX ADMIN — TAMSULOSIN HYDROCHLORIDE 0.8 MG: 0.4 CAPSULE ORAL at 21:27

## 2024-05-30 RX ADMIN — DULOXETINE HYDROCHLORIDE 60 MG: 30 CAPSULE, DELAYED RELEASE ORAL at 18:38

## 2024-05-30 RX ADMIN — ONDANSETRON 4 MG: 2 INJECTION INTRAMUSCULAR; INTRAVENOUS at 18:47

## 2024-05-30 RX ADMIN — FUROSEMIDE 120 MG: 40 TABLET ORAL at 21:27

## 2024-05-30 RX ADMIN — GABAPENTIN 800 MG: 400 CAPSULE ORAL at 18:40

## 2024-05-30 RX ADMIN — METHYLPREDNISOLONE SODIUM SUCCINATE 125 MG: 125 INJECTION INTRAMUSCULAR; INTRAVENOUS at 18:38

## 2024-05-30 RX ADMIN — DIGOXIN 125 MCG: 125 TABLET ORAL at 18:40

## 2024-05-30 RX ADMIN — ROSUVASTATIN CALCIUM 20 MG: 10 TABLET, FILM COATED ORAL at 21:27

## 2024-05-30 RX ADMIN — WARFARIN SODIUM 10 MG: 5 TABLET ORAL at 18:39

## 2024-05-30 RX ADMIN — CEFTRIAXONE SODIUM 2 G: 2 INJECTION, SOLUTION INTRAVENOUS at 14:52

## 2024-05-30 RX ADMIN — INSULIN LISPRO 2 UNITS: 100 INJECTION, SOLUTION INTRAVENOUS; SUBCUTANEOUS at 21:28

## 2024-05-30 RX ADMIN — Medication 1000 UNITS: at 18:40

## 2024-05-30 RX ADMIN — Medication 1 TABLET: at 18:38

## 2024-05-30 RX ADMIN — AZITHROMYCIN MONOHYDRATE 500 MG: 500 INJECTION, POWDER, LYOPHILIZED, FOR SOLUTION INTRAVENOUS at 15:48

## 2024-05-30 SDOH — SOCIAL STABILITY: SOCIAL INSECURITY: ARE THERE ANY APPARENT SIGNS OF INJURIES/BEHAVIORS THAT COULD BE RELATED TO ABUSE/NEGLECT?: NO

## 2024-05-30 SDOH — SOCIAL STABILITY: SOCIAL INSECURITY: HAVE YOU HAD ANY THOUGHTS OF HARMING ANYONE ELSE?: NO

## 2024-05-30 SDOH — SOCIAL STABILITY: SOCIAL INSECURITY: HAS ANYONE EVER THREATENED TO HURT YOUR FAMILY OR YOUR PETS?: NO

## 2024-05-30 SDOH — SOCIAL STABILITY: SOCIAL INSECURITY: ARE YOU OR HAVE YOU BEEN THREATENED OR ABUSED PHYSICALLY, EMOTIONALLY, OR SEXUALLY BY ANYONE?: NO

## 2024-05-30 SDOH — SOCIAL STABILITY: SOCIAL INSECURITY: DO YOU FEEL ANYONE HAS EXPLOITED OR TAKEN ADVANTAGE OF YOU FINANCIALLY OR OF YOUR PERSONAL PROPERTY?: NO

## 2024-05-30 SDOH — SOCIAL STABILITY: SOCIAL INSECURITY: HAVE YOU HAD THOUGHTS OF HARMING ANYONE ELSE?: NO

## 2024-05-30 SDOH — SOCIAL STABILITY: SOCIAL INSECURITY: WERE YOU ABLE TO COMPLETE ALL THE BEHAVIORAL HEALTH SCREENINGS?: YES

## 2024-05-30 SDOH — SOCIAL STABILITY: SOCIAL INSECURITY: ABUSE: ADULT

## 2024-05-30 SDOH — SOCIAL STABILITY: SOCIAL INSECURITY: DOES ANYONE TRY TO KEEP YOU FROM HAVING/CONTACTING OTHER FRIENDS OR DOING THINGS OUTSIDE YOUR HOME?: NO

## 2024-05-30 SDOH — SOCIAL STABILITY: SOCIAL INSECURITY: DO YOU FEEL UNSAFE GOING BACK TO THE PLACE WHERE YOU ARE LIVING?: NO

## 2024-05-30 ASSESSMENT — LIFESTYLE VARIABLES
EVER HAD A DRINK FIRST THING IN THE MORNING TO STEADY YOUR NERVES TO GET RID OF A HANGOVER: NO
HOW OFTEN DO YOU HAVE 6 OR MORE DRINKS ON ONE OCCASION: NEVER
HOW OFTEN DO YOU HAVE A DRINK CONTAINING ALCOHOL: NEVER
HAVE PEOPLE ANNOYED YOU BY CRITICIZING YOUR DRINKING: NO
TOTAL SCORE: 0
HOW MANY STANDARD DRINKS CONTAINING ALCOHOL DO YOU HAVE ON A TYPICAL DAY: PATIENT DOES NOT DRINK
HAVE YOU EVER FELT YOU SHOULD CUT DOWN ON YOUR DRINKING: NO
EVER FELT BAD OR GUILTY ABOUT YOUR DRINKING: NO
AUDIT-C TOTAL SCORE: 0
AUDIT-C TOTAL SCORE: 0
SKIP TO QUESTIONS 9-10: 1

## 2024-05-30 ASSESSMENT — ACTIVITIES OF DAILY LIVING (ADL)
HEARING - RIGHT EAR: DEAF
BATHING: INDEPENDENT
PATIENT'S MEMORY ADEQUATE TO SAFELY COMPLETE DAILY ACTIVITIES?: YES
LACK_OF_TRANSPORTATION: NO
HEARING - LEFT EAR: FUNCTIONAL
FEEDING YOURSELF: INDEPENDENT
GROOMING: INDEPENDENT
DRESSING YOURSELF: INDEPENDENT
WALKS IN HOME: INDEPENDENT
TOILETING: INDEPENDENT
ADEQUATE_TO_COMPLETE_ADL: YES
JUDGMENT_ADEQUATE_SAFELY_COMPLETE_DAILY_ACTIVITIES: YES

## 2024-05-30 ASSESSMENT — COGNITIVE AND FUNCTIONAL STATUS - GENERAL
PATIENT BASELINE BEDBOUND: NO
DAILY ACTIVITIY SCORE: 24
MOBILITY SCORE: 24

## 2024-05-30 ASSESSMENT — PAIN DESCRIPTION - PAIN TYPE: TYPE: ACUTE PAIN

## 2024-05-30 ASSESSMENT — PAIN SCALES - GENERAL
PAINLEVEL_OUTOF10: 0 - NO PAIN
PAINLEVEL_OUTOF10: 8

## 2024-05-30 ASSESSMENT — PAIN DESCRIPTION - LOCATION: LOCATION: CHEST

## 2024-05-30 ASSESSMENT — PAIN - FUNCTIONAL ASSESSMENT: PAIN_FUNCTIONAL_ASSESSMENT: 0-10

## 2024-05-30 NOTE — PROGRESS NOTES
"  Subjective  Nasir Sandoval  is a 64 y.o. year old male who presents for acute respiratory  Seen [facility] and given antibiotics but not getting    Blood pressure 140/80, pulse 110, height 1.88 m (6' 2\"), weight (!) 164 kg (362 lb), SpO2 94%.   Penicillins  Past Medical History:   Diagnosis Date    VALERIANO (acute kidney injury) (CMS-HCC) 05/15/2012    Avulsion of left eye, initial encounter 11/30/2018    Traumatic enucleation of left eye    Avulsion of left eye, initial encounter 11/30/2018    Traumatic enucleation of left eye    Carcinoma in situ of prostate 10/31/2023    Epidermal cyst 10/31/2023    Esophageal dysphagia 10/31/2023    Heart failure (Multi) 10/31/2023    Heart failure, unspecified (Multi) 10/31/2023    Hypotension 03/03/2023    Ingrowing toenail 10/31/2023    Nephrolithiasis 05/15/2012    Pain, unspecified 10/31/2023    Recurrent major depression (CMS-HCC) 10/31/2023    Uric acid urolithiasis 10/31/2023    Vitreous degeneration, left eye 11/30/2018    PVD (posterior vitreous detachment), left eye    Vitreous degeneration, left eye 11/30/2018    PVD (posterior vitreous detachment), left eye     History reviewed. No pertinent surgical history.  No family history on file.  @SOC    Current Outpatient Medications   Medication Sig Dispense Refill    albuterol 90 mcg/actuation inhaler Inhale 2 puffs every 6 hours if needed for shortness of breath.      ammonium lactate (Amlactin) 12 % cream Apply 1 Application topically if needed for dry skin (toes).      bacitracin 500 unit/gram ointment Apply 1 Application topically 2 times a day.      benzonatate (Tessalon) 100 mg capsule Take 1 capsule (100 mg) by mouth 3 times a day as needed for cough. Do not crush or chew. 20 capsule 0    carboxymethylcellulose (Refresh Plus) 0.5 % ophthalmic solution Administer 1 drop into the left eye 3 times a day as needed for dry eyes.      cetirizine (ZyrTEC) 5 mg tablet Take 1 tablet (5 mg) by mouth once daily.      " cholecalciferol (Vitamin D-3) 25 MCG (1000 UT) tablet Take 1 tablet (1,000 Units) by mouth once daily.      clindamycin (Cleocin T) 1 % external solution APPLY A SUFFICIENT AMOUNT EXTERNALLY TWICE A DAY TO SCROTUM AS NEEDED FOR LESION      digoxin (Lanoxin) 125 MCG tablet Take 1 tablet (125 mcg) by mouth once daily.      docosahexaenoic acid/epa (FISH OIL ORAL) Take 1 capsule by mouth 2 times a day.      docusate sodium (Colace) 100 mg capsule Take 2 capsules (200 mg) by mouth once daily in the morning.      doxycycline (Vibramycin) 100 mg capsule Take 1 capsule (100 mg) by mouth 2 times a day for 5 days. Take with at least 8 ounces (large glass) of water, do not lie down for 30 minutes after 10 capsule 0    DULoxetine (Cymbalta) 60 mg DR capsule Take 1 capsule (60 mg) by mouth once daily.      furosemide (Lasix) 40 mg tablet Take 3 tablets (120 mg) by mouth 2 times a day.      gabapentin (Neurontin) 400 mg capsule Take 2 capsules (800 mg) by mouth 3 times a day.      glipiZIDE (Glucotrol) 10 mg tablet Take 1 tablet (10 mg) by mouth 2 times a day before meals.      guaiFENesin (Mucinex) 600 mg 12 hr tablet Take 1 tablet (600 mg) by mouth 2 times a day. Do not crush, chew, or split. 60 tablet 0    ketoconazole (NIZOral) 2 % shampoo SHAMPOO A SUFFICIENT AMOUNT EXTERNALLY EVERY OTHER DAY .  LATHER ONTO THE SCALP, FACE, BEHIND EARS FOR 5 MINUTES EVERY OTHER DAY  THEN RINSE .  LATHER ONTO THE SCALP, FACE, BEHIND EARS FOR 5 MINUTES EVERY OTHER DAY   THEN RINSE      KRILL OIL ORAL Take by mouth every 12 hours.      lisinopril 2.5 mg tablet Take 1 tablet (2.5 mg) by mouth once daily.      lubricating eye drops ophthalmic solution Administer 1 drop into the left eye 3 times a day as needed for dry eyes.      magnesium oxide (Mag-Ox) 400 mg (241.3 mg magnesium) tablet Take 1 tablet (400 mg) by mouth twice a day.      metFORMIN (Glucophage) 1,000 mg tablet Take 1 tablet (1,000 mg) by mouth 2 times daily (morning and late  afternoon).      metoprolol tartrate (Lopressor) 25 mg tablet Take 1 tablet (25 mg) by mouth 2 times a day. 180 tablet 0    multivitamin tablet Take 1 tablet by mouth once daily.      multivitamin with minerals tablet Take 1 tablet by mouth once daily.      omega 3-dha-epa-fish oil (Fish OiL) 1,000 mg (120 mg-180 mg) capsule Take 1 capsule (1,000 mg) by mouth 2 times a day.      potassium chloride CR (Klor-Con M20) 20 mEq ER tablet Take 2 tablets (40 mEq) by mouth 2 times a day.      potassium citrate CR (Urocit-K-5) 5 mEq ER tablet Take 3 tablets (15 mEq) by mouth once daily at bedtime. Do not crush, chew, or split.      predniSONE (Deltasone) 10 mg tablet Take 1 tablet (10 mg) by mouth once daily for 5 days. 5 tablet 0    psyllium (Metamucil) 3.4 gram packet Take by mouth.      psyllium husk (METAMUCIL ORAL) Take 1 Scoop by mouth once daily.      rosuvastatin (Crestor) 20 mg tablet Take 1 tablet (20 mg) by mouth once daily at bedtime.      spironolactone (Aldactone) 25 mg tablet Take 1 tablet (25 mg) by mouth once daily. 30 tablet 11    tamsulosin (Flomax) 0.4 mg 24 hr capsule Take 2 capsules (0.8 mg) by mouth once daily at bedtime.      topiramate (Topamax) 50 mg tablet Take 1 tablet (50 mg) by mouth 2 times a day. 60 tablet 11    warfarin (Coumadin) 10 mg tablet Take 1 tablet (10 mg) by mouth. Thursday      warfarin (Coumadin) 5 mg tablet Take 2.5 tablets (12.5 mg) by mouth. 6 times weekly       No current facility-administered medications for this visit.        ROS  Review of Systems   All other systems reviewed and are negative.      Physical Exam  Physical Exam  Constitutional:       Appearance: He is obese.   HENT:      Head: Normocephalic and atraumatic.   Cardiovascular:      Rate and Rhythm: Rhythm irregularly irregular.   Pulmonary:      Comments: Diffusse rhonchi with expiratory wheezes  Skin:     General: Skin is warm and dry.   Neurological:      General: No focal deficit present.      Mental Status:  He is alert and oriented to person, place, and time.   Psychiatric:         Mood and Affect: Mood normal.         Behavior: Behavior normal.          EKG  Encounter Date: 05/30/24   ECG 12 lead   Result Value    Ventricular Rate 97    QRS Duration 108    QT Interval 352    QTC Calculation(Bazett) 447    R Axis -75    T Axis 88    QRS Count 16    Q Onset 205    T Offset 381    QTC Fredericia 413    Narrative    Atrial fibrillation  Left axis deviation  Low voltage QRS  Possible Anterolateral infarct , age undetermined  Abnormal ECG  When compared with ECG of 30-MAY-2024 11:49,  PREVIOUS ECG IS PRESENT       Problem List Items Addressed This Visit       (HFpEF) heart failure with preserved ejection fraction (Multi)     2/21/24 echocardiogram LVEf = 45%, moderate LVH, moderate LAE frank mild to moderate aortic dilation         AF (atrial fibrillation) (Multi)    Relevant Medications    spironolactone (Aldactone) 25 mg tablet    Other Relevant Orders    ECG 12 Lead    Hypertension    Traumatic enucleation of right eye    Obstructive sleep apnea of adult    Morbid obesity (Multi)    Type 2 diabetes mellitus without complications (Multi)    Dilation of thoracic aorta (CMS-HCC)    Acute lower respiratory infection - Primary         Advised to go to [facility] ER      Dutch Milan MD

## 2024-05-30 NOTE — ED PROVIDER NOTES
HPI   Chief Complaint   Patient presents with    Shortness of Breath     Chest pain, cough, congestion, dizziness, light headed, ear pain, sore throat       HPI     Patient is a 64-year-old male with a past medical history of CHF, recent pneumonia diagnosis with the treatment of a 10-day course of antibiotics since finished coming in with continued symptoms of shortness of breath.  Patient was recently seen 10 days ago at urgent care when he had symptoms of cough mildly productive and reportedly lung sounds concerning for pneumonia.  He did not get any extremity in the time of starting a course of treatment.  He did not feel better and went to follow-up with a provider today.  Was able to get in with his cardiologist who stated since he was not improving he should come into the emergency department for further evaluation.  Patient states he does have some chest discomfort feeling very congested and having worsening symptoms with laying flat.  Cannot tolerate laying flat.               Johnny Coma Scale Score: 15                     Patient History   Past Medical History:   Diagnosis Date    VALERIANO (acute kidney injury) (CMS-HCC) 05/15/2012    Avulsion of left eye, initial encounter 11/30/2018    Traumatic enucleation of left eye    Avulsion of left eye, initial encounter 11/30/2018    Traumatic enucleation of left eye    Carcinoma in situ of prostate 10/31/2023    Epidermal cyst 10/31/2023    Esophageal dysphagia 10/31/2023    Heart failure (Multi) 10/31/2023    Heart failure, unspecified (Multi) 10/31/2023    Hypotension 03/03/2023    Ingrowing toenail 10/31/2023    Nephrolithiasis 05/15/2012    Pain, unspecified 10/31/2023    Recurrent major depression (CMS-HCC) 10/31/2023    Uric acid urolithiasis 10/31/2023    Vitreous degeneration, left eye 11/30/2018    PVD (posterior vitreous detachment), left eye    Vitreous degeneration, left eye 11/30/2018    PVD (posterior vitreous detachment), left eye     No past surgical  history on file.  No family history on file.  Social History     Tobacco Use    Smoking status: Former     Current packs/day: 0.00     Types: Cigarettes     Quit date: 1980     Years since quittin.4    Smokeless tobacco: Never   Substance Use Topics    Alcohol use: Yes     Comment: RARELY    Drug use: Never       Physical Exam   ED Triage Vitals [24 1152]   Temperature Heart Rate Respirations BP   36.5 °C (97.7 °F) 99 (!) 28 (!) 187/111      Pulse Ox Temp src Heart Rate Source Patient Position   94 % -- -- --      BP Location FiO2 (%)     -- --       Physical Exam  Vitals and nursing note reviewed.   Constitutional:       General: He is not in acute distress.     Appearance: He is well-developed.   HENT:      Head: Normocephalic and atraumatic.   Eyes:      Conjunctiva/sclera: Conjunctivae normal.   Cardiovascular:      Rate and Rhythm: Normal rate and regular rhythm.      Heart sounds: No murmur heard.  Pulmonary:      Effort: Pulmonary effort is normal. No respiratory distress.      Breath sounds: Examination of the right-lower field reveals rhonchi. Examination of the left-lower field reveals rhonchi. Rhonchi present.   Abdominal:      Palpations: Abdomen is soft.      Tenderness: There is no abdominal tenderness.   Musculoskeletal:         General: No swelling.      Cervical back: Neck supple.   Skin:     General: Skin is warm and dry.      Capillary Refill: Capillary refill takes less than 2 seconds.   Neurological:      Mental Status: He is alert.   Psychiatric:         Mood and Affect: Mood normal.         ED Course & MDM   ED Course as of 24 1523   Thu May 30, 2024   1344 EKG as interpreted by myself with atrial fibrillation, rate 97, , QTc 447.  A-fib, no ischemic changes noted. []   1442 CBC and Auto Differential(!)  Reviewed and reassuring []   1443 Blood Gas Venous Full Panel(!)  Reviewed and reassuring []   1446 B-Type Natriuretic Peptide  wnl []      ED Course User  Index  [AH] Jada Gillespie MD         Diagnoses as of 05/30/24 1523   Shortness of breath   Pneumonia due to infectious organism, unspecified laterality, unspecified part of lung       Medical Decision Making  74-year-old male presenting as above.  Arrival hemodynamically he is tachypneic, hypertensive.  Borderline tachycardic.  He is in A-fib with a known history of A-fib on anticoagulation.  He endorses some left-sided chest discomfort however appreciated clinically on exam some right lung rhonchi.  Will get x-ray for evaluation.  X-ray interpreted on my evaluation with positive spine sign consistent with likely pneumonia.  Will start patient on IV Rocephin and azithromycin.  Will get blood cultures.  Patient will likely require admission given failed outpatient full treatment course.  Patient agreeable with this plan.  Pending results of labs for disposition likely admission.    Patient's workup here is reviewed reassuring, notably he does have pneumonia on his chest x-ray, failed outpatient therapy will be started on a course here and plan for admission.    Procedure  Procedures     Jada Gillespie MD  05/30/24 1523

## 2024-05-30 NOTE — H&P
History Of Present Illness  Nasir Sandoval is a 64 y.o. 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  Patient seen recently at urgent care and completed 10-day course of amoxicillin due to having symptoms of productive cough and reported lung sounds concerning for pneumonia.  States he has been coughing up yellow phlegm.  Patient did not feel better after course of antibiotics.  Patient was able to see his cardiologist today who recommended coming to the ED for further evaluation.  Patient does report some upper left chest discomfort, congestion, and worsening symptoms while laying flat and with exertion.  States that chest pain is worse when he is coughing.  Denies any radiation.  States he was also prescribed benzonatate for his cough, notes mild improvement.  Admits to generalized weakness and also occasional headaches.  Also reports nausea and a couple episodes of nonbloody emesis.  Denies dizziness, abdominal pain, fevers or chills, urination/bowel changes, or difficulty with ambulation.  Does not use oxygen at home.    ED course: On arrival to the ED, patient was afebrile, tachycardic with heart rate 99, tachypneic with respirations 28, hypertensive with blood pressure 187/111, and SpO2 94% on room air.  Glucose 159, BUN 25.  Troponin 11.  BNP 18.  WBC 8.6.  Flu, RSV, COVID-negative.  VBG shows pH 7.38, pCO2 49, pO2 37, and HCO3 29.  Chest x-ray results noted below.  Patient given Rocephin and Zithromax in the ED.    EKG (interpreted by ED physician): Atrial fibrillation, rate 97, , QTc 447.  No ischemic changes noted.    Admitting provider is Dr. Cam     Past Medical History  Past Medical History:   Diagnosis Date    VALERIANO (acute kidney injury) (CMS-[facility]) 05/15/2012    Avulsion of left eye, initial encounter 11/30/2018     Traumatic enucleation of left eye    Avulsion of left eye, initial encounter 11/30/2018    Traumatic enucleation of left eye    Carcinoma in situ of prostate 10/31/2023    Epidermal cyst 10/31/2023    Esophageal dysphagia 10/31/2023    Heart failure (Multi) 10/31/2023    Heart failure, unspecified (Multi) 10/31/2023    Hypotension 03/03/2023    Ingrowing toenail 10/31/2023    Nephrolithiasis 05/15/2012    Pain, unspecified 10/31/2023    Recurrent major depression (CMS-HCC) 10/31/2023    Uric acid urolithiasis 10/31/2023    Vitreous degeneration, left eye 11/30/2018    PVD (posterior vitreous detachment), left eye    Vitreous degeneration, left eye 11/30/2018    PVD (posterior vitreous detachment), left eye     Surgical History  No past surgical history on file.     Social History  He reports that he quit smoking about 44 years ago. His smoking use included cigarettes. He has never used smokeless tobacco. He reports current alcohol use. He reports that he does not use drugs.    Family History  No family history on file.     Allergies  Penicillins    Review of Systems  10 point review of system negative except as noted above in HPI    Physical Exam  Constitutional:       General: He is not in acute distress.     Appearance: He is obese.   HENT:      Head: Normocephalic and atraumatic.      Mouth/Throat:      Mouth: Mucous membranes are moist.      Pharynx: Oropharynx is clear.   Eyes:      Extraocular Movements: Extraocular movements intact.      Conjunctiva/sclera: Conjunctivae normal.      Pupils: Pupils are equal, round, and reactive to light.      Comments: Right eye surgically removed secondary to blindness.   Cardiovascular:      Rate and Rhythm: Tachycardia present. Rhythm irregular.      Pulses: Normal pulses.      Heart sounds: Normal heart sounds.   Pulmonary:      Effort: Pulmonary effort is normal.      Breath sounds: Wheezing and rhonchi present.   Abdominal:      General: Bowel sounds are normal.       "Palpations: Abdomen is soft.      Tenderness: There is no abdominal tenderness.   Musculoskeletal:         General: No tenderness. Normal range of motion.      Right lower leg: Edema present.      Left lower leg: Edema present.   Skin:     General: Skin is warm and dry.   Neurological:      General: No focal deficit present.      Mental Status: He is alert and oriented to person, place, and time.   Psychiatric:         Mood and Affect: Mood normal.         Behavior: Behavior normal.       Last Recorded Vitals  Blood pressure 155/76, pulse 98, temperature 36.5 °C (97.7 °F), resp. rate 18, height 1.88 m (6' 2\"), weight (!) 160 kg (352 lb), SpO2 94%.    Relevant Results  Results for orders placed or performed during the hospital encounter of 05/30/24 (from the past 24 hour(s))   CBC and Auto Differential   Result Value Ref Range    WBC 8.6 4.4 - 11.3 x10*3/uL    nRBC 0.0 0.0 - 0.0 /100 WBCs    RBC 4.42 (L) 4.50 - 5.90 x10*6/uL    Hemoglobin 13.9 13.5 - 17.5 g/dL    Hematocrit 41.9 41.0 - 52.0 %    MCV 95 80 - 100 fL    MCH 31.4 26.0 - 34.0 pg    MCHC 33.2 32.0 - 36.0 g/dL    RDW 14.9 (H) 11.5 - 14.5 %    Platelets 180 150 - 450 x10*3/uL    Neutrophils % 70.3 40.0 - 80.0 %    Immature Granulocytes %, Automated 0.9 0.0 - 0.9 %    Lymphocytes % 17.0 13.0 - 44.0 %    Monocytes % 9.0 2.0 - 10.0 %    Eosinophils % 2.3 0.0 - 6.0 %    Basophils % 0.5 0.0 - 2.0 %    Neutrophils Absolute 6.04 1.20 - 7.70 x10*3/uL    Immature Granulocytes Absolute, Automated 0.08 0.00 - 0.70 x10*3/uL    Lymphocytes Absolute 1.46 1.20 - 4.80 x10*3/uL    Monocytes Absolute 0.77 0.10 - 1.00 x10*3/uL    Eosinophils Absolute 0.20 0.00 - 0.70 x10*3/uL    Basophils Absolute 0.04 0.00 - 0.10 x10*3/uL   Magnesium   Result Value Ref Range    Magnesium 2.03 1.60 - 2.40 mg/dL   Comprehensive metabolic panel   Result Value Ref Range    Glucose 159 (H) 74 - 99 mg/dL    Sodium 139 136 - 145 mmol/L    Potassium 3.9 3.5 - 5.3 mmol/L    Chloride 101 98 - 107 " mmol/L    Bicarbonate 28 21 - 32 mmol/L    Anion Gap 14 10 - 20 mmol/L    Urea Nitrogen 25 (H) 6 - 23 mg/dL    Creatinine 1.17 0.50 - 1.30 mg/dL    eGFR 70 >60 mL/min/1.73m*2    Calcium 9.4 8.6 - 10.3 mg/dL    Albumin 4.3 3.4 - 5.0 g/dL    Alkaline Phosphatase 68 33 - 136 U/L    Total Protein 8.0 6.4 - 8.2 g/dL    AST 21 9 - 39 U/L    Bilirubin, Total 0.4 0.0 - 1.2 mg/dL    ALT 28 10 - 52 U/L   B-Type Natriuretic Peptide   Result Value Ref Range    BNP 18 0 - 99 pg/mL   Blood Gas Venous Full Panel   Result Value Ref Range    POCT pH, Venous 7.38 7.33 - 7.43 pH    POCT pCO2, Venous 49 41 - 51 mm Hg    POCT pO2, Venous 37 35 - 45 mm Hg    POCT SO2, Venous 62 45 - 75 %    POCT Oxy Hemoglobin, Venous 60.7 45.0 - 75.0 %    POCT Hematocrit Calculated, Venous 43.0 41.0 - 52.0 %    POCT Sodium, Venous 138 136 - 145 mmol/L    POCT Potassium, Venous 4.1 3.5 - 5.3 mmol/L    POCT Chloride, Venous 99 98 - 107 mmol/L    POCT Ionized Calicum, Venous 1.15 1.10 - 1.33 mmol/L    POCT Glucose, Venous 165 (H) 74 - 99 mg/dL    POCT Lactate, Venous 2.0 0.4 - 2.0 mmol/L    POCT Base Excess, Venous 2.9 -2.0 - 3.0 mmol/L    POCT HCO3 Calculated, Venous 29.0 (H) 22.0 - 26.0 mmol/L    POCT Hemoglobin, Venous 14.4 13.5 - 17.5 g/dL    POCT Anion Gap, Venous 14.0 10.0 - 25.0 mmol/L    Patient Temperature 37.0 degrees Celsius    FiO2 21 %   Influenza A, and B PCR   Result Value Ref Range    Flu A Result Not Detected Not Detected    Flu B Result Not Detected Not Detected   Sars-CoV-2 PCR   Result Value Ref Range    Coronavirus 2019, PCR Not Detected Not Detected   RSV PCR   Result Value Ref Range    RSV PCR Not Detected Not Detected   Troponin I, High Sensitivity, Initial   Result Value Ref Range    Troponin I, High Sensitivity 11 0 - 20 ng/L      XR chest 2 views    Result Date: 5/30/2024  Interpreted By:  Aj Wilde, STUDY: XR CHEST 2 VIEWS;  5/30/2024 1:25 pm   INDICATION: Signs/Symptoms:cough.   COMPARISON: 02/20/2024.   ACCESSION  NUMBER(S): [id number]   ORDERING CLINICIAN: MERRICK CONTRERAS   FINDINGS: CARDIOMEDIASTINAL SILHOUETTE: There is stable enlargement of the cardiac silhouette.   LUNGS: There is increased opacification of the right hemithorax with mild diffuse interstitial prominence as well as irregular infiltrates and/or atelectasis in the right perihilar and infrahilar regions. Mild interstitial prominence and basilar atelectasis seen in the left lung. No pneumothorax is seen.   ABDOMEN: No remarkable upper abdominal findings.   BONES: Thoracic dextrocurvature may be partially exaggerated by positioning. Mild endplate spurring present in the spine.       1.  Increased opacification of the right hemithorax with mild diffuse interstitial prominence as well as irregular infiltrates and/or atelectasis of the right perihilar and infrahilar regions could be related to vascular congestion and/or infection. Component of pleural fluid not excluded.       MACRO: None.   Signed by: Aj Wilde 5/30/2024 1:47 PM Dictation workstation:   [id number]       Assessment/Plan   Principal Problem:    Shortness of breath    Nasir Sandoval is a 64 y.o. male presents today with continued symptoms of shortness of breath.  Patient seen recently at urgent care and completed 10-day course of amoxicillin due to having symptoms of productive cough and reported lung sounds concerning for pneumonia. Denies improvement with antibiotics. Patient does report some upper left chest discomfort, nausea and emesis, generalized weakness, congestion, and worsening symptoms while laying flat and with exertion.     #Pneumonia  #Shortness of breath  #Chest discomfort  #Generalized weakness  #Tachycardia; History of atrial fibrillation  Admit as inpatient with telemetry monitoring  Initial troponin negative, repeat pending.  EKG reviewed.  Denies current chest pain.  IV Zithromax daily, IV Rocephin daily  Albuterol as needed, DuoNebs every 6 hours  Oxygen as needed  Blood  cultures pending  Sputum culture ordered  125 mg IV Solu-Medrol, then 40 mg every 8 hours  PT/OT for evaluation and treatment  Urinalysis ordered, Legionella antigen and strep pneumo antigen  Tylenol, Zofran as needed  Cardiac/diabetic diet  Q 4 vitals  CBC, BMP in a.m.  Coagulation studies added onto lab work since patient takes warfarin at home    #Diabetes mellitus  Sliding scale insulin  ACHS Accu-Cheks  Hold home metformin and glipizide    Continue home medications as appropriate    Chronic conditions:  CHF, DM, morbid obesity    #DVT prophylaxis  Continue home warfarin  SCDs, ambulation       I spent 45 minutes in the professional and overall care of this patient.    Shon Waller PA-C

## 2024-05-30 NOTE — PROGRESS NOTES
Pharmacy Medication History Review    Nasir Sandoval is a 64 y.o. male admitted for Shortness of breath. Pharmacy reviewed the patient's [id number] medications and allergies for accuracy.    The list below reflectives the updated PTA list. Please review each medication in order reconciliation for additional clarification and justification.  Prior to Admission medications    Medication Sig Start Date End Date Taking? Authorizing Provider   albuterol 90 mcg/actuation inhaler Inhale 2 puffs every 6 hours if needed for shortness of breath.   Yes Historical Provider, MD   ammonium lactate (Amlactin) 12 % cream Apply 1 Application topically if needed for dry skin (toes). 5/9/22  Yes Historical Provider, MD   bacitracin 500 unit/gram ointment Apply 1 Application topically 2 times a day. Apply to eye 4/19/22  Yes Historical Provider, MD   carboxymethylcellulose (Refresh Plus) 0.5 % ophthalmic solution Administer 1 drop into the left eye 3 times a day as needed for dry eyes. 5/2/22  Yes Historical Provider, MD   cholecalciferol (Vitamin D-3) 25 MCG (1000 UT) tablet Take 2 tablets (50 mcg) by mouth once daily. 4/11/22  Yes Historical Provider, MD   digoxin (Lanoxin) 125 MCG tablet Take 1 tablet (125 mcg) by mouth once daily. 4/11/22  Yes Historical Provider, MD   docosahexaenoic acid/epa (FISH OIL ORAL) Take 1 capsule by mouth 2 times a day.   Yes Historical Provider, MD   docusate sodium (Colace) 100 mg capsule Take 2 capsules (200 mg) by mouth once daily in the morning.   Yes Historical Provider, MD   DULoxetine (Cymbalta) 60 mg DR capsule Take 1 capsule (60 mg) by mouth once daily. 11/1/23  Yes Historical Provider, MD   furosemide (Lasix) 40 mg tablet Take 3 tablets (120 mg) by mouth 2 times a day.   Yes Historical Provider, MD   gabapentin (Neurontin) 400 mg capsule Take 2 capsules (800 mg) by mouth 3 times a day.   Yes Historical Provider, MD   glipiZIDE (Glucotrol) 10 mg tablet Take 2 tablets (20 mg) by mouth 2  times a day before meals.   Yes Historical Provider, MD   lisinopril 2.5 mg tablet Take 1 tablet (2.5 mg) by mouth once daily. 2/1/24  Yes Historical Provider, MD   magnesium oxide (Mag-Ox) 400 mg (241.3 mg magnesium) tablet Take 1 tablet (400 mg) by mouth twice a day. 9/23/16  Yes Historical Provider, MD   metFORMIN (Glucophage) 1,000 mg tablet Take 1 tablet (1,000 mg) by mouth 2 times daily (morning and late afternoon).   Yes Historical Provider, MD   multivitamin tablet Take 1 tablet by mouth once daily.   Yes Historical Provider, MD   potassium chloride CR (Klor-Con M20) 20 mEq ER tablet Take 2 tablets (40 mEq) by mouth 2 times a day. 4/11/22  Yes Historical Provider, MD   potassium citrate CR (Urocit-K-5) 5 mEq ER tablet Take 3 tablets (15 mEq) by mouth once daily at bedtime. Do not crush, chew, or split.   Yes Historical Provider, MD   rosuvastatin (Crestor) 20 mg tablet Take 1 tablet (20 mg) by mouth once daily at bedtime. 9/20/22  Yes Historical Provider, MD   tamsulosin (Flomax) 0.4 mg 24 hr capsule Take 2 capsules (0.8 mg) by mouth once daily at bedtime.   Yes Historical Provider, MD   topiramate (Topamax) 50 mg tablet Take 1 tablet (50 mg) by mouth 2 times a day. 10/25/23 10/24/24 Yes Vicente Gerardo MD PhD   warfarin (Coumadin) 5 mg tablet Take 2.5 tablets (12.5 mg) by mouth. 6 times weekly   Yes Historical Provider, MD   cetirizine (ZyrTEC) 5 mg tablet Take 1 tablet (5 mg) by mouth once daily. 6/27/22  no Historical Provider, MD   clindamycin (Cleocin T) 1 % external solution APPLY A SUFFICIENT AMOUNT EXTERNALLY TWICE A DAY TO SCROTUM AS NEEDED FOR LESION 5/9/22  no Historical Provider, MD   cyclobenzaprine (Flexeril) 5 mg tablet Take 1 tablet (5 mg) by mouth 2 times a day as needed for muscle spasms for up to 5 days. 2/22/24 2/27/24 no Jo Johnson MD   gabapentin (Neurontin) 800 mg tablet Take 1 tablet (800 mg) by mouth 3 times a day. 10/25/23 11/24/23 walter Gerardo MD PhD   ketoconazole  (NIZOral) 2 % shampoo SHAMPOO A SUFFICIENT AMOUNT EXTERNALLY EVERY OTHER DAY .  LATHER ONTO THE SCALP, FACE, BEHIND EARS FOR 5 MINUTES EVERY OTHER DAY  THEN RINSE .  LATHER ONTO THE SCALP, FACE, BEHIND EARS FOR 5 MINUTES EVERY OTHER DAY   THEN RINSE 5/9/22  yes Historical Provider, MD   KRILL OIL ORAL Take by mouth every 12 hours.   no Historical Provider, MD   omega 3-dha-epa-fish oil (Fish OiL) 1,000 mg (120 mg-180 mg) capsule Take 1 capsule (1,000 mg) by mouth 2 times a day.   no Historical Provider, MD   psyllium (Metamucil) 3.4 gram packet Take by mouth. 2/28/23  no Historical Provider, MD   psyllium husk (METAMUCIL ORAL) Take 1 Scoop by mouth once daily.   no Historical Provider, MD   simvastatin (Zocor) 10 mg tablet Take 1 tablet (10 mg) by mouth once daily at bedtime.   no Historical Provider, MD   vitamin D3-vitamin K2, MK4, 1,000-100 unit-mcg tablet Take 1,000 Units by mouth once daily.   yes Historical Provider, MD   warfarin (Coumadin) 10 mg tablet Take 1 tablet (10 mg) by mouth. Thursday 7/31/13  yes Historical Provider, MD        The list below reflectives the updated allergy list. Please review each documented allergy for additional clarification and justification.  Allergies  Reviewed by Marito Lombardi, EMT on 5/30/2024        Severity Reactions Comments    Penicillins Low Rash Siva Wall, RN Registered Nurse Signed  ED Notes Service date: 09/01/2014 2:49 PM    Unasyn test dose given Sq per MD request w/o reaction of any kind after 20 minutes.  Medication administered as ordered.            Below are additional concerns with the patient's PTA list.      Mita Mazariegos

## 2024-05-31 ENCOUNTER — APPOINTMENT (OUTPATIENT)
Dept: RADIOLOGY | Facility: HOSPITAL | Age: 64
DRG: 194 | End: 2024-05-31
Payer: MEDICARE

## 2024-05-31 LAB
ANION GAP SERPL CALC-SCNC: 15 MMOL/L (ref 10–20)
BUN SERPL-MCNC: 25 MG/DL (ref 6–23)
CALCIUM SERPL-MCNC: 9.2 MG/DL (ref 8.6–10.3)
CHLORIDE SERPL-SCNC: 102 MMOL/L (ref 98–107)
CO2 SERPL-SCNC: 26 MMOL/L (ref 21–32)
CREAT SERPL-MCNC: 1.22 MG/DL (ref 0.5–1.3)
EGFRCR SERPLBLD CKD-EPI 2021: 67 ML/MIN/1.73M*2
ERYTHROCYTE [DISTWIDTH] IN BLOOD BY AUTOMATED COUNT: 14.9 % (ref 11.5–14.5)
GLUCOSE BLD MANUAL STRIP-MCNC: 241 MG/DL (ref 74–99)
GLUCOSE BLD MANUAL STRIP-MCNC: 255 MG/DL (ref 74–99)
GLUCOSE BLD MANUAL STRIP-MCNC: 275 MG/DL (ref 74–99)
GLUCOSE BLD MANUAL STRIP-MCNC: 316 MG/DL (ref 74–99)
GLUCOSE SERPL-MCNC: 247 MG/DL (ref 74–99)
HCT VFR BLD AUTO: 42.7 % (ref 41–52)
HGB BLD-MCNC: 13.6 G/DL (ref 13.5–17.5)
MCH RBC QN AUTO: 31.1 PG (ref 26–34)
MCHC RBC AUTO-ENTMCNC: 31.9 G/DL (ref 32–36)
MCV RBC AUTO: 98 FL (ref 80–100)
NRBC BLD-RTO: 0 /100 WBCS (ref 0–0)
PLATELET # BLD AUTO: 209 X10*3/UL (ref 150–450)
POTASSIUM SERPL-SCNC: 4.4 MMOL/L (ref 3.5–5.3)
RBC # BLD AUTO: 4.38 X10*6/UL (ref 4.5–5.9)
SODIUM SERPL-SCNC: 139 MMOL/L (ref 136–145)
WBC # BLD AUTO: 7.6 X10*3/UL (ref 4.4–11.3)

## 2024-05-31 PROCEDURE — 97161 PT EVAL LOW COMPLEX 20 MIN: CPT | Mod: GP

## 2024-05-31 PROCEDURE — 85027 COMPLETE CBC AUTOMATED: CPT | Performed by: PHYSICIAN ASSISTANT

## 2024-05-31 PROCEDURE — 1200000002 HC GENERAL ROOM WITH TELEMETRY DAILY

## 2024-05-31 PROCEDURE — 2500000004 HC RX 250 GENERAL PHARMACY W/ HCPCS (ALT 636 FOR OP/ED): Performed by: INTERNAL MEDICINE

## 2024-05-31 PROCEDURE — 71250 CT THORAX DX C-: CPT

## 2024-05-31 PROCEDURE — 2500000001 HC RX 250 WO HCPCS SELF ADMINISTERED DRUGS (ALT 637 FOR MEDICARE OP): Performed by: STUDENT IN AN ORGANIZED HEALTH CARE EDUCATION/TRAINING PROGRAM

## 2024-05-31 PROCEDURE — 82947 ASSAY GLUCOSE BLOOD QUANT: CPT

## 2024-05-31 PROCEDURE — 2500000002 HC RX 250 W HCPCS SELF ADMINISTERED DRUGS (ALT 637 FOR MEDICARE OP, ALT 636 FOR OP/ED)

## 2024-05-31 PROCEDURE — 2500000002 HC RX 250 W HCPCS SELF ADMINISTERED DRUGS (ALT 637 FOR MEDICARE OP, ALT 636 FOR OP/ED): Performed by: PHYSICIAN ASSISTANT

## 2024-05-31 PROCEDURE — 2500000002 HC RX 250 W HCPCS SELF ADMINISTERED DRUGS (ALT 637 FOR MEDICARE OP, ALT 636 FOR OP/ED): Mod: MUE | Performed by: INTERNAL MEDICINE

## 2024-05-31 PROCEDURE — 2500000004 HC RX 250 GENERAL PHARMACY W/ HCPCS (ALT 636 FOR OP/ED): Performed by: PHYSICIAN ASSISTANT

## 2024-05-31 PROCEDURE — 36415 COLL VENOUS BLD VENIPUNCTURE: CPT | Performed by: PHYSICIAN ASSISTANT

## 2024-05-31 PROCEDURE — 97165 OT EVAL LOW COMPLEX 30 MIN: CPT | Mod: GO

## 2024-05-31 PROCEDURE — 94640 AIRWAY INHALATION TREATMENT: CPT

## 2024-05-31 PROCEDURE — 99222 1ST HOSP IP/OBS MODERATE 55: CPT | Performed by: STUDENT IN AN ORGANIZED HEALTH CARE EDUCATION/TRAINING PROGRAM

## 2024-05-31 PROCEDURE — 2500000001 HC RX 250 WO HCPCS SELF ADMINISTERED DRUGS (ALT 637 FOR MEDICARE OP): Performed by: PHYSICIAN ASSISTANT

## 2024-05-31 PROCEDURE — 80048 BASIC METABOLIC PNL TOTAL CA: CPT | Performed by: PHYSICIAN ASSISTANT

## 2024-05-31 PROCEDURE — 2500000001 HC RX 250 WO HCPCS SELF ADMINISTERED DRUGS (ALT 637 FOR MEDICARE OP): Performed by: INTERNAL MEDICINE

## 2024-05-31 PROCEDURE — 2500000005 HC RX 250 GENERAL PHARMACY W/O HCPCS: Performed by: PHYSICIAN ASSISTANT

## 2024-05-31 PROCEDURE — 87449 NOS EACH ORGANISM AG IA: CPT | Mod: PARLAB | Performed by: PHYSICIAN ASSISTANT

## 2024-05-31 PROCEDURE — 2500000004 HC RX 250 GENERAL PHARMACY W/ HCPCS (ALT 636 FOR OP/ED): Performed by: STUDENT IN AN ORGANIZED HEALTH CARE EDUCATION/TRAINING PROGRAM

## 2024-05-31 PROCEDURE — 5A09357 ASSISTANCE WITH RESPIRATORY VENTILATION, LESS THAN 24 CONSECUTIVE HOURS, CONTINUOUS POSITIVE AIRWAY PRESSURE: ICD-10-PCS | Performed by: INTERNAL MEDICINE

## 2024-05-31 PROCEDURE — 87899 AGENT NOS ASSAY W/OPTIC: CPT | Mod: PARLAB | Performed by: PHYSICIAN ASSISTANT

## 2024-05-31 RX ORDER — GUAIFENESIN 600 MG/1
600 TABLET, EXTENDED RELEASE ORAL 2 TIMES DAILY
Status: DISCONTINUED | OUTPATIENT
Start: 2024-05-31 | End: 2024-06-02 | Stop reason: HOSPADM

## 2024-05-31 RX ORDER — IPRATROPIUM BROMIDE AND ALBUTEROL SULFATE 2.5; .5 MG/3ML; MG/3ML
3 SOLUTION RESPIRATORY (INHALATION)
Status: DISCONTINUED | OUTPATIENT
Start: 2024-05-31 | End: 2024-06-02 | Stop reason: HOSPADM

## 2024-05-31 RX ORDER — METOPROLOL TARTRATE 25 MG/1
25 TABLET, FILM COATED ORAL 2 TIMES DAILY
Status: DISCONTINUED | OUTPATIENT
Start: 2024-05-31 | End: 2024-06-02 | Stop reason: HOSPADM

## 2024-05-31 RX ORDER — INSULIN LISPRO 100 [IU]/ML
8 INJECTION, SOLUTION INTRAVENOUS; SUBCUTANEOUS ONCE
Status: COMPLETED | OUTPATIENT
Start: 2024-05-31 | End: 2024-05-31

## 2024-05-31 RX ADMIN — METOPROLOL TARTRATE 25 MG: 25 TABLET, FILM COATED ORAL at 09:34

## 2024-05-31 RX ADMIN — METHYLPREDNISOLONE SODIUM SUCCINATE 40 MG: 40 INJECTION, POWDER, FOR SOLUTION INTRAMUSCULAR; INTRAVENOUS at 10:03

## 2024-05-31 RX ADMIN — INSULIN LISPRO 6 UNITS: 100 INJECTION, SOLUTION INTRAVENOUS; SUBCUTANEOUS at 17:19

## 2024-05-31 RX ADMIN — AZITHROMYCIN MONOHYDRATE 500 MG: 500 INJECTION, POWDER, LYOPHILIZED, FOR SOLUTION INTRAVENOUS at 14:38

## 2024-05-31 RX ADMIN — GABAPENTIN 800 MG: 400 CAPSULE ORAL at 14:31

## 2024-05-31 RX ADMIN — GUAIFENESIN 600 MG: 600 TABLET, EXTENDED RELEASE ORAL at 20:08

## 2024-05-31 RX ADMIN — INSULIN LISPRO 4 UNITS: 100 INJECTION, SOLUTION INTRAVENOUS; SUBCUTANEOUS at 09:27

## 2024-05-31 RX ADMIN — ACETAMINOPHEN 650 MG: 325 TABLET ORAL at 09:02

## 2024-05-31 RX ADMIN — METOPROLOL TARTRATE 25 MG: 25 TABLET, FILM COATED ORAL at 20:08

## 2024-05-31 RX ADMIN — TOPIRAMATE 50 MG: 25 TABLET, FILM COATED ORAL at 09:03

## 2024-05-31 RX ADMIN — TAMSULOSIN HYDROCHLORIDE 0.8 MG: 0.4 CAPSULE ORAL at 20:08

## 2024-05-31 RX ADMIN — ACETAMINOPHEN 650 MG: 325 TABLET ORAL at 05:46

## 2024-05-31 RX ADMIN — IPRATROPIUM BROMIDE AND ALBUTEROL SULFATE 3 ML: .5; 3 SOLUTION RESPIRATORY (INHALATION) at 14:15

## 2024-05-31 RX ADMIN — INSULIN LISPRO 8 UNITS: 100 INJECTION, SOLUTION INTRAVENOUS; SUBCUTANEOUS at 21:17

## 2024-05-31 RX ADMIN — ONDANSETRON HYDROCHLORIDE 4 MG: 4 TABLET, FILM COATED ORAL at 10:26

## 2024-05-31 RX ADMIN — METHYLPREDNISOLONE SODIUM SUCCINATE 40 MG: 40 INJECTION, POWDER, FOR SOLUTION INTRAMUSCULAR; INTRAVENOUS at 17:12

## 2024-05-31 RX ADMIN — IPRATROPIUM BROMIDE AND ALBUTEROL SULFATE 3 ML: .5; 3 SOLUTION RESPIRATORY (INHALATION) at 19:15

## 2024-05-31 RX ADMIN — IPRATROPIUM BROMIDE AND ALBUTEROL SULFATE 3 ML: .5; 3 SOLUTION RESPIRATORY (INHALATION) at 06:41

## 2024-05-31 RX ADMIN — Medication 1000 UNITS: at 09:06

## 2024-05-31 RX ADMIN — LISINOPRIL 2.5 MG: 5 TABLET ORAL at 08:57

## 2024-05-31 RX ADMIN — TOPIRAMATE 50 MG: 25 TABLET, FILM COATED ORAL at 20:08

## 2024-05-31 RX ADMIN — DULOXETINE HYDROCHLORIDE 60 MG: 30 CAPSULE, DELAYED RELEASE ORAL at 09:03

## 2024-05-31 RX ADMIN — GUAIFENESIN 600 MG: 600 TABLET, EXTENDED RELEASE ORAL at 10:20

## 2024-05-31 RX ADMIN — FUROSEMIDE 120 MG: 40 TABLET ORAL at 09:00

## 2024-05-31 RX ADMIN — INSULIN LISPRO 6 UNITS: 100 INJECTION, SOLUTION INTRAVENOUS; SUBCUTANEOUS at 12:08

## 2024-05-31 RX ADMIN — ROSUVASTATIN CALCIUM 20 MG: 10 TABLET, FILM COATED ORAL at 20:08

## 2024-05-31 RX ADMIN — CEFTRIAXONE SODIUM 2 G: 2 INJECTION, SOLUTION INTRAVENOUS at 13:44

## 2024-05-31 RX ADMIN — GABAPENTIN 800 MG: 400 CAPSULE ORAL at 08:59

## 2024-05-31 RX ADMIN — DOCUSATE SODIUM 200 MG: 100 CAPSULE, LIQUID FILLED ORAL at 09:00

## 2024-05-31 RX ADMIN — GABAPENTIN 800 MG: 400 CAPSULE ORAL at 20:08

## 2024-05-31 RX ADMIN — METHYLPREDNISOLONE SODIUM SUCCINATE 40 MG: 40 INJECTION, POWDER, FOR SOLUTION INTRAMUSCULAR; INTRAVENOUS at 02:23

## 2024-05-31 RX ADMIN — DIGOXIN 125 MCG: 125 TABLET ORAL at 09:02

## 2024-05-31 RX ADMIN — Medication 1 TABLET: at 09:02

## 2024-05-31 RX ADMIN — WARFARIN SODIUM 12.5 MG: 7.5 TABLET ORAL at 17:15

## 2024-05-31 RX ADMIN — FUROSEMIDE 120 MG: 40 TABLET ORAL at 20:09

## 2024-05-31 ASSESSMENT — COGNITIVE AND FUNCTIONAL STATUS - GENERAL
MOBILITY SCORE: 24
MOBILITY SCORE: 24
DAILY ACTIVITIY SCORE: 24
DAILY ACTIVITIY SCORE: 24

## 2024-05-31 ASSESSMENT — PAIN - FUNCTIONAL ASSESSMENT
PAIN_FUNCTIONAL_ASSESSMENT: 0-10

## 2024-05-31 ASSESSMENT — PAIN SCALES - GENERAL
PAINLEVEL_OUTOF10: 8
PAINLEVEL_OUTOF10: 8
PAINLEVEL_OUTOF10: 2
PAINLEVEL_OUTOF10: 0 - NO PAIN

## 2024-05-31 NOTE — CARE PLAN
The patient's goals for the shift include      The clinical goals for the shift include Patient will have no chest pain throughout this shift    Problem: Pain  Goal: Takes deep breaths with improved pain control throughout the shift  Outcome: Progressing  Goal: Turns in bed with improved pain control throughout the shift  Outcome: Progressing  Goal: Walks with improved pain control throughout the shift  Outcome: Progressing  Goal: Performs ADL's with improved pain control throughout shift  Outcome: Progressing  Goal: Participates in PT with improved pain control throughout the shift  Outcome: Progressing  Goal: Free from opioid side effects throughout the shift  Outcome: Progressing  Goal: Free from acute confusion related to pain meds throughout the shift  Outcome: Progressing     Problem: Diabetes  Goal: Achieve decreasing blood glucose levels by end of shift  Outcome: Progressing  Goal: Increase stability of blood glucose readings by end of shift  Outcome: Progressing  Goal: Decrease in ketones present in urine by end of shift  Outcome: Progressing  Goal: Maintain electrolyte levels within acceptable range throughout shift  Outcome: Progressing  Goal: Maintain glucose levels >70mg/dl to <250mg/dl throughout shift  Outcome: Progressing  Goal: No changes in neurological exam by end of shift  Outcome: Progressing  Goal: Learn about and adhere to nutrition recommendations by end of shift  Outcome: Progressing  Goal: Vital signs within normal range for age by end of shift  Outcome: Progressing  Goal: Increase self care and/or family involovement by end of shift  Outcome: Progressing  Goal: Receive DSME education by end of shift  Outcome: Progressing

## 2024-05-31 NOTE — PROGRESS NOTES
Occupational Therapy    Evaluation    Patient Name: Nasir Sandoval  MRN: [id number]  Today's Date: 5/31/2024  Time Calculation  Start Time: 1052  Stop Time: 1104  Time Calculation (min): 12 min  910/910-A    Assessment  IP OT Assessment  OT Assessment: Discharge patient from OT since at/close to baseline function in ADL's and functional transfers/mobility:  indep.  This hospital visit 5/30/2024 due to SOB, generalized weakness; pneumonia, tachycardia.  End of Session Communication: Bedside nurse  End of Session Patient Position: Bed, 2 rail up, Alarm off, not on at start of session; call-light within reach    Plan:  No Skilled OT: At baseline function  OT Discharge Recommendations: No further acute OT, No OT needed after discharge  OT - OK to Discharge: Yes (to next level of care when medically cleared by physician/medical team)    Subjective   Current Problem:  1. Shortness of breath        2. 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, and recent pneumonia diagnosis  Prior to Session Communication: Bedside nurse who confirmed that patient is medically stable to participate in this OT session  Patient Position Received: Bed, 2 rail up, Alarm off, not on at start of session  General Comment: Patient seen bedside in room 910; cooperative, motivated, pleasant    Precautions:  Fall due to blindness    Pain:  Pain Assessment  Pain Assessment: 0-10  Pain Score: 8  Pain Location:  (headache)    Objective     Cognition:  Overall Cognitive Status: Within Functional Limits     Home Living:  Type of Home: House  Lives With: Spouse  Home Adaptive Equipment: Cane, Walker rolling or standard  Home Layout: Bed/bath upstairs  Home Access: Stairs to enter without  rails (4)  Bathroom Shower/Tub: Tub/shower unit  Bathroom Toilet:  (higher height)  Bathroom Equipment: Hand-held shower hose  Home Living Comments: sleeps in regular bed     Prior Function:  Level of [place]: Independent with ADLs and functional transfers  Homemaking Assistance: Needs assistance (wife primarily does however indep cooking)  Ambulatory Assistance: Independent (for balance and to compensate for decrease vision per patient)  Hand Dominance: Right  Prior Function Comments: wife and patient drive; per patient:  2 falls/last 6 months    Current ADL:  indep compensatory techniques including propping one leg at a time on hospital bed to don/doff socks indep   ADL Comments: indep compensatory techniques including propping one leg at a time on hospital bed to don/doff socks indep  Bed Mobility/Transfers:   Bed Mobility  Bed Mobility: Yes  Bed Mobility 1  Bed Mobility 1: Supine to sitting, Sitting to supine  Level of Assistance 1: Independent  Bed Mobility Comments 1: HOB elevated  Transfers  Transfer: Yes  Transfer 1  Transfer From 1: Sit to, Stand to  Transfer to 1: Bed  Transfer Level of Assistance 1: Independent    Ambulation/Gait Training:  Functional Mobility  Functional Mobility Performed: Yes  Functional Mobility 1  Assistance 1: Independent  Comments 1: compensatory techniques due to right eye blindness    Sitting Balance:  Static Sitting Balance  Static Sitting-Level of Assistance: Independent    Standing Balance:  Static Standing Balance  Static Standing-Level of Assistance: Independent    Extremities: RUE   RUE : Within Functional Limits and LUE   LUE: Within Functional Limits    Outcome Measures: [facility] Daily Activity  Putting on and taking off regular lower body clothing: None  Bathing (including washing, rinsing, drying): None  Putting on and taking off regular upper body clothing: None  Toileting, which includes using toilet, bedpan or urinal: None  Taking care of personal grooming such as  brushing teeth: None  Eating Meals: None  Daily Activity - Total Score: 24    EDUCATION:   Education Documentation  Handouts, taught by Umm Hogan OT at 5/31/2024  1:13 PM.  Learner: Patient  Readiness: Acceptance  Method: Explanation, Handout  Response: Verbalizes Understanding, Demonstrated Understanding  Comment: energy conservation / diaphragmatic breathing techniques with handouts issued with patient agreed to read thoroughly on own

## 2024-05-31 NOTE — PROGRESS NOTES
Patients daughter gave C information: [facility] [phone]. Paper in chart

## 2024-05-31 NOTE — H&P
History Of Present Illness patient seen and examined by me.  History obtained from the patient and the ED and the NP's notes.  Patient's family is present in the room.  Patient follows at the [facility] and states that he had a virtual video visit with [facility] yesterday.  Nasir Sandoval is a 64 y.o. 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  Patient states that he had not been feeling well and had been having a cough with yellow phlegm and getting short of breath and having left sided rib cage pain patient seen recently at urgent care and completed 10-day course of amoxicillin due to having symptoms of productive cough  States he has been coughing up yellow phlegm.  Patient did not feel better after course of antibiotics.  Patient was able to see his cardiologist today /Dr. Milan who recommended coming to the ED for further evaluation.  Patient does report some upper left chest discomfort, congestion, and worsening symptoms while laying flat and with exertion.  States that chest pain is worse when he is coughing.  Patient is a remote smoker and quit more than 40 years ago.  He states he is not currently on home oxygen but he had some oxygen sitting at home from 7 8 years ago and he started using 2 L of nasal cannula oxygen which is not prescribed on his own for his shortness of breath at home,   States he was also prescribed benzonatate for his cough, notes mild improvement.  Admits to generalized weakness and also occasional headaches.  Also reports nausea and a couple episodes of nonbloody emesis.  Denies dizziness, abdominal pain, fevers or chills, urination/bowel changes, or difficulty with ambulation.  Does not use oxygen at home.  Patient in the ED was in rapid atrial fibrillation and has been  admitted to the telemetry floor for further treatment and management of his pneumonia/worsening shortness of breath and A-fib.  ED course: On arrival to the ED, patient was afebrile, tachycardic with heart rate 99, tachypneic with respirations 28, hypertensive with blood pressure 187/111, and SpO2 94% on room air.  Glucose 159, BUN 25.  Troponin 11.  BNP 18.  WBC 8.6.  Flu, RSV, COVID-negative.  VBG shows pH 7.38, pCO2 49, pO2 37, and HCO3 29.  Chest x-ray results noted below.  Patient given Rocephin and Zithromax in the ED.    EKG (interpreted by ED physician): Atrial fibrillation, rate 97, , QTc 447.  No ischemic changes noted.    Admitting provider is Dr. Cam     Past Medical History  Past Medical History:   Diagnosis Date    VALERIANO (acute kidney injury) (CMS-HCC) 05/15/2012    Avulsion of left eye, initial encounter 11/30/2018    Traumatic enucleation of left eye    Avulsion of left eye, initial encounter 11/30/2018    Traumatic enucleation of left eye    Carcinoma in situ of prostate 10/31/2023    Epidermal cyst 10/31/2023    Esophageal dysphagia 10/31/2023    Heart failure (Multi) 10/31/2023    Heart failure, unspecified (Multi) 10/31/2023    Hypotension 03/03/2023    Ingrowing toenail 10/31/2023    Nephrolithiasis 05/15/2012    Pain, unspecified 10/31/2023    Recurrent major depression (CMS-HCC) 10/31/2023    Uric acid urolithiasis 10/31/2023    Vitreous degeneration, left eye 11/30/2018    PVD (posterior vitreous detachment), left eye    Vitreous degeneration, left eye 11/30/2018    PVD (posterior vitreous detachment), left eye   ./Has had has had cardioversions by cardiology in the past  Surgical History  Updated and reviewed/surgical removal of the right eye secondary to blindness     Social History  He reports that he quit smoking about 44 years ago. His smoking use included cigarettes. He has never used smokeless tobacco. He reports current alcohol use. He reports that he does not use  "drugs.    Family History  No family history on file.     Allergies  Penicillins    Review of Systems  10 point review of system negative except as noted above in HPI    Physical Exam  Constitutional:       General: He is not in acute distress.     Appearance: He is obese.   HENT:      Head: Normocephalic and atraumatic.      Mouth/Throat:      Mouth: Mucous membranes are moist.      Pharynx: Oropharynx is clear.   Eyes:      Extraocular Movements: Extraocular movements intact.      Conjunctiva/sclera: Conjunctivae normal.      Pupils: Pupils are equal, round, and reactive to light.      Comments: Right eye surgically removed secondary to blindness.   Cardiovascular:      Rate and Rhythm: Tachycardia present. Rhythm irregular.      Pulses: Normal pulses.      Heart sounds: Normal heart sounds.   Pulmonary:      Effort: Pulmonary effort is normal.      Breath sounds: Wheezing and rhonchi present.   Abdominal:      General: Bowel sounds are normal.      Palpations: Abdomen is soft.      Tenderness: There is no abdominal tenderness.   Musculoskeletal:         General: No tenderness. Normal range of motion.      Right lower leg: Edema present.      Left lower leg: Edema present.   Skin:     General: Skin is warm and dry.   Neurological:      General: No focal deficit present.      Mental Status: He is alert and oriented to person, place, and time.   Psychiatric:         Mood and Affect: Mood normal.         Behavior: Behavior normal.       Last Recorded Vitals  Blood pressure 138/72, pulse (!) 114, temperature 37.4 °C (99.3 °F), temperature source Temporal, resp. rate 22, height 1.88 m (6' 2\"), weight (!) 160 kg (352 lb), SpO2 93%.    Relevant Results  Results for orders placed or performed during the hospital encounter of 05/30/24 (from the past 24 hour(s))   CBC and Auto Differential   Result Value Ref Range    WBC 8.6 4.4 - 11.3 x10*3/uL    nRBC 0.0 0.0 - 0.0 /100 WBCs    RBC 4.42 (L) 4.50 - 5.90 x10*6/uL    " Hemoglobin 13.9 13.5 - 17.5 g/dL    Hematocrit 41.9 41.0 - 52.0 %    MCV 95 80 - 100 fL    MCH 31.4 26.0 - 34.0 pg    MCHC 33.2 32.0 - 36.0 g/dL    RDW 14.9 (H) 11.5 - 14.5 %    Platelets 180 150 - 450 x10*3/uL    Neutrophils % 70.3 40.0 - 80.0 %    Immature Granulocytes %, Automated 0.9 0.0 - 0.9 %    Lymphocytes % 17.0 13.0 - 44.0 %    Monocytes % 9.0 2.0 - 10.0 %    Eosinophils % 2.3 0.0 - 6.0 %    Basophils % 0.5 0.0 - 2.0 %    Neutrophils Absolute 6.04 1.20 - 7.70 x10*3/uL    Immature Granulocytes Absolute, Automated 0.08 0.00 - 0.70 x10*3/uL    Lymphocytes Absolute 1.46 1.20 - 4.80 x10*3/uL    Monocytes Absolute 0.77 0.10 - 1.00 x10*3/uL    Eosinophils Absolute 0.20 0.00 - 0.70 x10*3/uL    Basophils Absolute 0.04 0.00 - 0.10 x10*3/uL   Magnesium   Result Value Ref Range    Magnesium 2.03 1.60 - 2.40 mg/dL   Comprehensive metabolic panel   Result Value Ref Range    Glucose 159 (H) 74 - 99 mg/dL    Sodium 139 136 - 145 mmol/L    Potassium 3.9 3.5 - 5.3 mmol/L    Chloride 101 98 - 107 mmol/L    Bicarbonate 28 21 - 32 mmol/L    Anion Gap 14 10 - 20 mmol/L    Urea Nitrogen 25 (H) 6 - 23 mg/dL    Creatinine 1.17 0.50 - 1.30 mg/dL    eGFR 70 >60 mL/min/1.73m*2    Calcium 9.4 8.6 - 10.3 mg/dL    Albumin 4.3 3.4 - 5.0 g/dL    Alkaline Phosphatase 68 33 - 136 U/L    Total Protein 8.0 6.4 - 8.2 g/dL    AST 21 9 - 39 U/L    Bilirubin, Total 0.4 0.0 - 1.2 mg/dL    ALT 28 10 - 52 U/L   B-Type Natriuretic Peptide   Result Value Ref Range    BNP 18 0 - 99 pg/mL   Blood Gas Venous Full Panel   Result Value Ref Range    POCT pH, Venous 7.38 7.33 - 7.43 pH    POCT pCO2, Venous 49 41 - 51 mm Hg    POCT pO2, Venous 37 35 - 45 mm Hg    POCT SO2, Venous 62 45 - 75 %    POCT Oxy Hemoglobin, Venous 60.7 45.0 - 75.0 %    POCT Hematocrit Calculated, Venous 43.0 41.0 - 52.0 %    POCT Sodium, Venous 138 136 - 145 mmol/L    POCT Potassium, Venous 4.1 3.5 - 5.3 mmol/L    POCT Chloride, Venous 99 98 - 107 mmol/L    POCT Ionized Calicum,  Venous 1.15 1.10 - 1.33 mmol/L    POCT Glucose, Venous 165 (H) 74 - 99 mg/dL    POCT Lactate, Venous 2.0 0.4 - 2.0 mmol/L    POCT Base Excess, Venous 2.9 -2.0 - 3.0 mmol/L    POCT HCO3 Calculated, Venous 29.0 (H) 22.0 - 26.0 mmol/L    POCT Hemoglobin, Venous 14.4 13.5 - 17.5 g/dL    POCT Anion Gap, Venous 14.0 10.0 - 25.0 mmol/L    Patient Temperature 37.0 degrees Celsius    FiO2 21 %   Influenza A, and B PCR   Result Value Ref Range    Flu A Result Not Detected Not Detected    Flu B Result Not Detected Not Detected   Sars-CoV-2 PCR   Result Value Ref Range    Coronavirus 2019, PCR Not Detected Not Detected   RSV PCR   Result Value Ref Range    RSV PCR Not Detected Not Detected   Troponin I, High Sensitivity, Initial   Result Value Ref Range    Troponin I, High Sensitivity 11 0 - 20 ng/L   Blood Culture    Specimen: Peripheral Venipuncture; Blood culture   Result Value Ref Range    Blood Culture Loaded on Instrument - Culture in progress    Blood Culture    Specimen: Peripheral Venipuncture; Blood culture   Result Value Ref Range    Blood Culture Loaded on Instrument - Culture in progress    Coagulation Screen   Result Value Ref Range    Protime 22.1 (H) 9.8 - 12.8 seconds    INR 1.9 (H) 0.9 - 1.1    aPTT 41 (H) 27 - 38 seconds   POCT GLUCOSE   Result Value Ref Range    POCT Glucose 220 (H) 74 - 99 mg/dL      XR chest 2 views    Result Date: 5/30/2024  Interpreted By:  Aj Wilde, STUDY: XR CHEST 2 VIEWS;  5/30/2024 1:25 pm   INDICATION: Signs/Symptoms:cough.   COMPARISON: 02/20/2024.   ACCESSION NUMBER(S): [id number]   ORDERING CLINICIAN: MERRICK CONTRERAS   FINDINGS: CARDIOMEDIASTINAL SILHOUETTE: There is stable enlargement of the cardiac silhouette.   LUNGS: There is increased opacification of the right hemithorax with mild diffuse interstitial prominence as well as irregular infiltrates and/or atelectasis in the right perihilar and infrahilar regions. Mild interstitial prominence and basilar atelectasis seen  in the left lung. No pneumothorax is seen.   ABDOMEN: No remarkable upper abdominal findings.   BONES: Thoracic dextrocurvature may be partially exaggerated by positioning. Mild endplate spurring present in the spine.       1.  Increased opacification of the right hemithorax with mild diffuse interstitial prominence as well as irregular infiltrates and/or atelectasis of the right perihilar and infrahilar regions could be related to vascular congestion and/or infection. Component of pleural fluid not excluded.       MACRO: None.   Signed by: Aj Wilde 5/30/2024 1:47 PM Dictation workstation:   [id number]       Assessment/Plan   Principal Problem:    Shortness of breath    Nasir Sandoval is a 64 y.o. male presents today with continued symptoms of shortness of breath.  And productive cough for the past 2 weeks patient seen recently at urgent care and completed 10-day course of amoxicillin due to having symptoms of productive cough and reported lung sounds concerning for pneumonia. Denies improvement with antibiotics. Patient does report some upper left chest discomfort, nausea and emesis, generalized weakness, congestion, and worsening symptoms while laying flat and with exertion.     #1/# Shortness of breath/cough/not feeling well/right lung pneumonia//community-acquired/hyperactive airway disease/remote former smoker quit more than 40 years ago/morbid obesity/patient has been started on IV Zithromax and IV Rocephin.  Patient has also been started on IV Solu-Medrol for his wheezing.  DuoNeb aerosol treatments every 8 hours and as needed as needed/Tessalon Perles/  2./Hypertension/elevated blood pressure on admission and patient has been started on lisinopril 2.5 mg p.o. daily./Chronic CHF with moderate concentric LVH/moderate left atrial enlargement/mild to moderate thoracic aortic dilation/ejection fraction of 45% on 2D echocardiogram of 2/2024/managed by Dr. Milan as his cardiologist in the outpatient.   BNP was normal on admission.  Has very minimal trace bilateral ankle edema.  Advised on no added salt diet.  #Chest discomfort/left rib cage pain most likely secondary to his pneumonia and pleuritic chest pain.  Troponin is negative.  #3#Generalized weakness and deconditioning/PT OT eval and up in chair daily.  #4# Rapid atrial fibrillation with RVR and has history of A-fib/history of cardioversions in the past followed by Dr. Milan/history of CHF with EF of 45% 2/2024./Patient will be resumed on his Coumadin and INR is 1.9.  Admit as inpatient with telemetry monitoring  Initial troponin negative, repeat pending.  EKG reviewed.  Denies current chest pain.  IV Zithromax daily, IV Rocephin daily  Albuterol as needed, DuoNebs every 6 hours  Oxygen as needed  Blood cultures pending  Sputum culture ordered  125 mg IV Solu-Medrol, then 40 mg every 8 hours  PT/OT for evaluation and treatment  Urinalysis ordered, Legionella antigen and strep pneumo antigen  Tylenol, Zofran as needed  Cardiac/diabetic diet  Q 4 vitals  CBC, BMP in a.m.  PT/INR is 1.9 and patient will be resumed on his home Coumadin dosage.    #5#Diabetes mellitus type II/with hyperglycemia morbid obesity/  Sliding scale insulin  ACHS Accu-Cheks  Hold home metformin and glipizide  Will check hemoglobin A1c.  Continue home medications as appropriate  #6 obstructive sleep apnea patient states he is not using CPAP at home he needs to be evaluated as an outpatient [facility]  Patient quit #7/smoking more than 40 years ago.  #DVT prophylaxis  Continue home warfarin  SCDs, ambulation   Labs and vitals noted.  Imaging results noted.  Medications reconciled.  Total time spent 45 minutes/time spent on patient interaction/counseling/assessment and plan and documentation.  Check repeat labs in AM.  I spent 45 minutes in the professional and overall care of this patient.    Cuba Cam MD

## 2024-05-31 NOTE — CONSULTS
Consults  History Of Present Illness:    Nasir Sandoval is a 64 y.o. male past with history notable for HFmrEF, atrial fibrillation, morbid obesity, sleep apnea, type 2 diabetes who had a recent diagnosis of pneumonia apparently presented to the hospital with worsening shortness of breath.  Patient was apparently seen in the urgent care's facility and given a course of oral amoxicillin due to symptoms of productive cough and shortness of breath.  Apparently antibiotics did not help.  Patient saw Dr. Milan yesterday who recommended patient present to the ER for further management.  In the ER initial vital signs revealed heart rate of 99 blood pressure 187/111 saturation of 94% on room air.  Respiratory rate of 28.  Troponins were 11.  BNP of 18.  CBC fairly unremarkable.  Chest x-ray showing increased opacification of the right hemithorax with mild diffuse interstitial prominence as well as irregular infiltrates and/or atelectasis of the right perihilar and infrahilar regions which may be vascular congestion versus infection.  Presenting ECG notable for atrial fibrillation with left anterior fascicular block.  Most recent echo from 2/20/2024 revealed ejection fraction of 45% moderate concentric hypertrophy Moderately dilated thoracic aorta measuring 4.4 cm.  Cardiology is consulted for atrial fibrillation.     Last Recorded Vitals:  Vitals:    05/31/24 0457 05/31/24 0600 05/31/24 0641 05/31/24 0745   BP: 109/70   127/69   BP Location: Right arm   Right arm   Patient Position: Lying   Sitting   Pulse: 103   87   Resp: 20   20   Temp: 36.1 °C (97 °F)   36.2 °C (97.2 °F)   TempSrc: Temporal   Temporal   SpO2: 92%  90% 92%   Weight:  (!) 160 kg (351 lb 15.4 oz)     Height:           Last Labs:  CBC - 5/31/2024:  5:00 AM  7.6 13.6 209    42.7      CMP - 5/31/2024:  5:00 AM  9.2 8.0 21 --- 0.4   3.0 4.3 28 68      PTT - 5/30/2024:  6:29 PM  1.9   22.1 41     Troponin I, High Sensitivity   Date/Time Value Ref Range Status    05/30/2024 01:31 PM 11 0 - 20 ng/L Final   02/20/2024 04:42 PM 13 0 - 20 ng/L Final     BNP   Date/Time Value Ref Range Status   05/30/2024 01:31 PM 18 0 - 99 pg/mL Final   10/31/2023 11:15 AM 11 0 - 99 pg/mL Final     Hemoglobin A1C   Date/Time Value Ref Range Status   01/30/2024 10:52 AM 7.3 (H) see below % Final   08/07/2023 11:35 AM 7.0 (A) % Final     Comment:          Diagnosis of Diabetes-Adults   Non-Diabetic: < or = 5.6%   Increased risk for developing diabetes: 5.7-6.4%   Diagnostic of diabetes: > or = 6.5%  .       Monitoring of Diabetes                Age (y)     Therapeutic Goal (%)   Adults:          >18           <7.0   Pediatrics:    13-18           <7.5                   7-12           <8.0                   0- 6            7.5-8.5   American Diabetes Association. Diabetes Care 33(S1), Jan 2010.   01/17/2023 08:57 AM 7.0 (A) % Final     Comment:          Diagnosis of Diabetes-Adults   Non-Diabetic: < or = 5.6%   Increased risk for developing diabetes: 5.7-6.4%   Diagnostic of diabetes: > or = 6.5%  .       Monitoring of Diabetes                Age (y)     Therapeutic Goal (%)   Adults:          >18           <7.0   Pediatrics:    13-18           <7.5                   7-12           <8.0                   0- 6            7.5-8.5   American Diabetes Association. Diabetes Care 33(S1), Jan 2010.       VLDL   Date/Time Value Ref Range Status   01/17/2023 08:57 AM 38 0 - 40 mg/dL Final   09/16/2022 08:22 AM SEE COMMENT 0 - 40 mg/dL Final     Comment:       Unable to calculate VLDL.   09/29/2020 09:15 AM 35 0 - 40 mg/dL Final      Last I/O:  I/O last 3 completed shifts:  In: 250 (1.6 mL/kg) [IV Piggyback:250]  Out: 400 (2.5 mL/kg) [Urine:400 (0.1 mL/kg/hr)]  Weight: 159.6 kg     Past Cardiology Tests (Last 3 Years):  EKG:  ECG 12 lead 05/30/2024 (Preliminary)      ECG 12 lead 02/20/2024      ECG 12 Lead 11/15/2023    Echo:  Transthoracic Echo (TTE) Complete 02/21/2024    Ejection Fractions:  No results  "found for: \"EF\"  Cath:  No results found for this or any previous visit from the past 1095 days.    Stress Test:  No results found for this or any previous visit from the past 1095 days.    Cardiac Imaging:  MR CARDIAC MORPHOLOGY AND FUNCTION W AND WO IV CONTRAST 05/26/2022      Past Medical History:  He has a past medical history of VALERIANO (acute kidney injury) (CMS-HCC) (05/15/2012), Avulsion of left eye, initial encounter (11/30/2018), Avulsion of left eye, initial encounter (11/30/2018), Carcinoma in situ of prostate (10/31/2023), Epidermal cyst (10/31/2023), Esophageal dysphagia (10/31/2023), Heart failure (Multi) (10/31/2023), Heart failure, unspecified (Multi) (10/31/2023), Hypotension (03/03/2023), Ingrowing toenail (10/31/2023), Nephrolithiasis (05/15/2012), Pain, unspecified (10/31/2023), Recurrent major depression (CMS-HCC) (10/31/2023), Uric acid urolithiasis (10/31/2023), Vitreous degeneration, left eye (11/30/2018), and Vitreous degeneration, left eye (11/30/2018).    Past Surgical History:  He has no past surgical history on file.      Social History:  He reports that he quit smoking about 44 years ago. His smoking use included cigarettes. He has never used smokeless tobacco. He reports current alcohol use. He reports that he does not use drugs.    Family History:  No family history on file.     Allergies:  Penicillins    Inpatient Medications:  Scheduled medications   Medication Dose Route Frequency    azithromycin  500 mg intravenous q24h    bacitracin  1 Application Topical BID    cefTRIAXone  2 g intravenous q24h    cholecalciferol  1,000 Units oral Daily    digoxin  125 mcg oral Daily    docusate sodium  200 mg oral q AM    DULoxetine  60 mg oral Daily    furosemide  120 mg oral BID    gabapentin  800 mg oral TID    insulin lispro  0-10 Units subcutaneous TID    ipratropium-albuteroL  3 mL nebulization TID    lisinopril  2.5 mg oral Daily    methylPREDNISolone sodium succinate (PF)  40 mg " intravenous q8h    multivitamin with minerals  1 tablet oral Daily    rosuvastatin  20 mg oral Nightly    tamsulosin  0.8 mg oral Nightly    topiramate  50 mg oral BID    warfarin  10 mg oral Every Thursday    warfarin  12.5 mg oral Once per day on Sunday Monday Tuesday Wednesday Friday Saturday     PRN medications   Medication    acetaminophen    Or    acetaminophen    albuterol    ammonium lactate    benzonatate    dextrose    dextrose    glucagon    lubricating eye drops    ondansetron    Or    ondansetron    oxygen     Continuous Medications   Medication Dose Last Rate     Outpatient Medications:  Current Outpatient Medications   Medication Instructions    albuterol 90 mcg/actuation inhaler 2 puffs, inhalation, Every 6 hours PRN    ammonium lactate (Amlactin) 12 % cream Apply 1 Application topically if needed for dry skin (toes).    bacitracin 500 unit/gram ointment Apply 1 Application topically 2 times a day. Apply to eye    carboxymethylcellulose (Refresh Plus) 0.5 % ophthalmic solution Administer 1 drop into the left eye 3 times a day as needed for dry eyes.    cetirizine (ZyrTEC) 5 mg tablet 1 tablet, oral, Daily    cholecalciferol (Vitamin D-3) 25 MCG (1000 UT) tablet 2 tablets, oral, Daily    clindamycin (Cleocin T) 1 % external solution APPLY A SUFFICIENT AMOUNT EXTERNALLY TWICE A DAY TO SCROTUM AS NEEDED FOR LESION    cyclobenzaprine (FLEXERIL) 5 mg, oral, 2 times daily PRN    digoxin (Lanoxin) 125 MCG tablet 1 tablet, oral, Daily    docosahexaenoic acid/epa (FISH OIL ORAL) Take 1 capsule by mouth 2 times a day.    docusate sodium (Colace) 100 mg capsule 2 capsules, oral, Every morning    DULoxetine (Cymbalta) 60 mg DR capsule 1 capsule, oral, Daily    furosemide (LASIX) 120 mg, oral, 2 times daily    gabapentin (NEURONTIN) 800 mg, oral, 3 times daily    gabapentin (NEURONTIN) 800 mg, oral, 3 times daily    glipiZIDE (GLUCOTROL) 20 mg, oral, 2 times daily before meals    ketoconazole (NIZOral) 2 %  shampoo SHAMPOO A SUFFICIENT AMOUNT EXTERNALLY EVERY OTHER DAY .  LATHER ONTO THE SCALP, FACE, BEHIND EARS FOR 5 MINUTES EVERY OTHER DAY  THEN RINSE .  LATHER ONTO THE SCALP, FACE, BEHIND EARS FOR 5 MINUTES EVERY OTHER DAY   THEN RINSE    KRILL OIL ORAL oral, Every 12 hours    lisinopril 2.5 mg, oral, Daily    magnesium oxide (MAG-OX) 400 mg, oral, 2 times daily    metFORMIN (GLUCOPHAGE) 1,000 mg, oral, 2 times daily (morning and late afternoon)    multivitamin tablet 1 tablet, oral, Daily    omega 3-dha-epa-fish oil (Fish OiL) 1,000 mg (120 mg-180 mg) capsule 1 capsule, oral, 2 times daily    potassium chloride CR (Klor-Con M20) 20 mEq ER tablet 2 tablets, oral, 2 times daily    potassium citrate CR (Urocit-K-5) 5 mEq ER tablet 15 mEq, oral, Nightly, Do not crush, chew, or split.    psyllium (Metamucil) 3.4 gram packet oral    psyllium husk (METAMUCIL ORAL) 1 Scoop, oral, Daily    rosuvastatin (Crestor) 20 mg tablet 1 tablet, oral, Nightly    simvastatin (ZOCOR) 10 mg, oral, Nightly    tamsulosin (FLOMAX) 0.8 mg, oral, Nightly    topiramate (TOPAMAX) 50 mg, oral, 2 times daily    vitamin D3-vitamin K2, MK4, 1,000-100 unit-mcg tablet 1,000 Units, oral, Daily    warfarin (Coumadin) 5 mg tablet Take 2.5 tablets (12.5 mg) by mouth. 6 times weekly    warfarin (COUMADIN) 10 mg, oral, Thursday       Physical Exam:  General: No acute distress,  A&O x3, obese male  Skin: Warm and dry  Neck: JVD is not elevated  ENT: Moist mucous membranes no lesions appreciated  Pulmonary: Mildly diminished bilateral lung fields  Cards: Regular rate rhythm, no murmurs gallops or rubs appreciated normal S1-S2  Abdomen: Obese, soft nontender nondistended  Extremities: Trace edema noted bilaterally  Psych: Appropriate mood and affect        Assessment/Plan     Atrial fibrillation: Initial presentation of A-fib with RVR.  Okay to continue oral digoxin 125 mcg daily.  Add metoprolol 25 mg twice a day.  Heart failure mildly reduced ejection  fraction with a EF of 45%: Continue oral Lasix.  Beta-blockade added as discussed above.  Will convert to long-acting formulation at discharge.  Continue lisinopril 2.5 mg once a day.  Community-acquired pneumonia: Receiving oral antibiotics and steroids as per primary team.    (This note was generated with voice recognition software and may contain errors including spelling, grammar, syntax and missed recognition of what was dictated, of which may not have been fully corrected)       Code Status:  DNR and No Intubation    Chacorta Gamboa MD PhD

## 2024-05-31 NOTE — PROGRESS NOTES
05/31/24 1024   Discharge Planning   Living Arrangements Spouse/significant other   Support Systems Spouse/significant other   Type of Residence Private residence   Do you have animals or pets at home? No   Who is requesting discharge planning? Provider     Met with pt and his wife at bedside, pt admit for PNA.  Pt denies any needs, has CPAP @ home, [facility] Odonnell, called the [facility] and left a message to return my call, will send clinicals when requested.   Rose Marie Kevin RN TCC

## 2024-05-31 NOTE — PROGRESS NOTES
Physical Therapy    Physical Therapy Evaluation    Patient Name: Nasir Sandoval  MRN: [id number]  Today's Date: 5/31/2024   Time Calculation  Start Time: 1053  Stop Time: 1105  Time Calculation (min): 12 min    Assessment/Plan   PT Assessment  End of Session Communication: Bedside nurse, Care Coordinator  End of Session Patient Position: Bed, 2 rail up, Alarm off, not on at start of session  IP OR SWING BED PT PLAN  Inpatient or Swing Bed: Inpatient  PT Plan  PT Eval Only Reason: At baseline function  PT - OK to Discharge: Yes      Subjective   General Visit Information:  General  Reason for Referral: Pneumonia,tachycardia  Referred By: JORGE L Waller PA-C  Past Medical History Relevant to Rehab: DM,CHF,PVD,VALERIANO,A-fib,morbid obesity, 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  Patient Position Received: Bed, 2 rail up, Alarm off, not on at start of session  Home Living:  Home Living  Type of Home: House  Lives With: Spouse  Home Adaptive Equipment: Walker rolling or standard, Cane  Prior Level of Function:  Prior Function Per Pt/Caregiver Report  Level of Elizabeth: Independent with ADLs and functional transfers  Ambulatory Assistance: Independent  Precautions:  Precautions  Medical Precautions: Cardiac precautions  Vital Signs:       Objective   Pain:  Pain Assessment  Pain Score: 8 (headache)  Cognition:  Cognition  Overall Cognitive Status: Within Functional Limits    General Assessments:  Functional Assessments:  Bed Mobility  Bed Mobility:  (Ind)    Transfers  Transfer:  (Ind)    Ambulation/Gait Training  Ambulation/Gait Training Performed:  (Ind 75 ft/no device,no lob noted)  Extremity/Trunk Assessments:    Outcome Measures:  [facility] Basic Mobility  Turning from your back to your side while in a flat bed without using bedrails: None  Moving from lying on your back to sitting on the side of a flat bed without using bedrails: None  Moving to and from bed to chair (including a wheelchair): None  Standing up from a chair using your arms  (e.g. wheelchair or bedside chair): None  To walk in hospital room: None  Climbing 3-5 steps with railing: None  Basic Mobility - Total Score: 24    Encounter Problems       Encounter Problems (Active)       Pain - Adult              Education Documentation  No documentation found.  Education Comments  No comments found.

## 2024-06-01 LAB
ANION GAP SERPL CALC-SCNC: 15 MMOL/L (ref 10–20)
BUN SERPL-MCNC: 27 MG/DL (ref 6–23)
CALCIUM SERPL-MCNC: 8.6 MG/DL (ref 8.6–10.3)
CHLORIDE SERPL-SCNC: 100 MMOL/L (ref 98–107)
CO2 SERPL-SCNC: 26 MMOL/L (ref 21–32)
CREAT SERPL-MCNC: 1.07 MG/DL (ref 0.5–1.3)
EGFRCR SERPLBLD CKD-EPI 2021: 78 ML/MIN/1.73M*2
GLUCOSE BLD MANUAL STRIP-MCNC: 231 MG/DL (ref 74–99)
GLUCOSE BLD MANUAL STRIP-MCNC: 246 MG/DL (ref 74–99)
GLUCOSE BLD MANUAL STRIP-MCNC: 257 MG/DL (ref 74–99)
GLUCOSE BLD MANUAL STRIP-MCNC: 289 MG/DL (ref 74–99)
GLUCOSE SERPL-MCNC: 220 MG/DL (ref 74–99)
HOLD SPECIMEN: NORMAL
INR PPP: 2.6 (ref 0.9–1.1)
LEGIONELLA AG UR QL: NEGATIVE
POTASSIUM SERPL-SCNC: 3.8 MMOL/L (ref 3.5–5.3)
PROTHROMBIN TIME: 29.9 SECONDS (ref 9.8–12.8)
S PNEUM AG UR QL: NEGATIVE
SODIUM SERPL-SCNC: 137 MMOL/L (ref 136–145)

## 2024-06-01 PROCEDURE — 1200000002 HC GENERAL ROOM WITH TELEMETRY DAILY

## 2024-06-01 PROCEDURE — 2500000001 HC RX 250 WO HCPCS SELF ADMINISTERED DRUGS (ALT 637 FOR MEDICARE OP): Performed by: INTERNAL MEDICINE

## 2024-06-01 PROCEDURE — 2500000005 HC RX 250 GENERAL PHARMACY W/O HCPCS: Performed by: PHYSICIAN ASSISTANT

## 2024-06-01 PROCEDURE — 83036 HEMOGLOBIN GLYCOSYLATED A1C: CPT | Performed by: INTERNAL MEDICINE

## 2024-06-01 PROCEDURE — 85610 PROTHROMBIN TIME: CPT | Performed by: INTERNAL MEDICINE

## 2024-06-01 PROCEDURE — 82947 ASSAY GLUCOSE BLOOD QUANT: CPT

## 2024-06-01 PROCEDURE — 36415 COLL VENOUS BLD VENIPUNCTURE: CPT | Performed by: INTERNAL MEDICINE

## 2024-06-01 PROCEDURE — 99232 SBSQ HOSP IP/OBS MODERATE 35: CPT | Performed by: STUDENT IN AN ORGANIZED HEALTH CARE EDUCATION/TRAINING PROGRAM

## 2024-06-01 PROCEDURE — 80051 ELECTROLYTE PANEL: CPT | Performed by: INTERNAL MEDICINE

## 2024-06-01 PROCEDURE — 2500000001 HC RX 250 WO HCPCS SELF ADMINISTERED DRUGS (ALT 637 FOR MEDICARE OP): Performed by: PHYSICIAN ASSISTANT

## 2024-06-01 PROCEDURE — 2500000001 HC RX 250 WO HCPCS SELF ADMINISTERED DRUGS (ALT 637 FOR MEDICARE OP): Performed by: STUDENT IN AN ORGANIZED HEALTH CARE EDUCATION/TRAINING PROGRAM

## 2024-06-01 PROCEDURE — 94640 AIRWAY INHALATION TREATMENT: CPT

## 2024-06-01 PROCEDURE — 2500000004 HC RX 250 GENERAL PHARMACY W/ HCPCS (ALT 636 FOR OP/ED): Performed by: INTERNAL MEDICINE

## 2024-06-01 PROCEDURE — 2500000002 HC RX 250 W HCPCS SELF ADMINISTERED DRUGS (ALT 637 FOR MEDICARE OP, ALT 636 FOR OP/ED): Performed by: PHYSICIAN ASSISTANT

## 2024-06-01 PROCEDURE — 2500000004 HC RX 250 GENERAL PHARMACY W/ HCPCS (ALT 636 FOR OP/ED): Performed by: STUDENT IN AN ORGANIZED HEALTH CARE EDUCATION/TRAINING PROGRAM

## 2024-06-01 PROCEDURE — 2500000004 HC RX 250 GENERAL PHARMACY W/ HCPCS (ALT 636 FOR OP/ED): Performed by: PHYSICIAN ASSISTANT

## 2024-06-01 PROCEDURE — 2500000002 HC RX 250 W HCPCS SELF ADMINISTERED DRUGS (ALT 637 FOR MEDICARE OP, ALT 636 FOR OP/ED): Performed by: INTERNAL MEDICINE

## 2024-06-01 RX ORDER — DIPHENHYDRAMINE HCL 25 MG
25 CAPSULE ORAL NIGHTLY PRN
Status: DISCONTINUED | OUTPATIENT
Start: 2024-06-01 | End: 2024-06-02 | Stop reason: HOSPADM

## 2024-06-01 RX ORDER — DOCUSATE SODIUM 100 MG/1
100 CAPSULE, LIQUID FILLED ORAL DAILY PRN
Status: DISCONTINUED | OUTPATIENT
Start: 2024-06-01 | End: 2024-06-02

## 2024-06-01 RX ADMIN — BACITRACIN 1 APPLICATION: 500 OINTMENT TOPICAL at 21:00

## 2024-06-01 RX ADMIN — GABAPENTIN 800 MG: 400 CAPSULE ORAL at 14:49

## 2024-06-01 RX ADMIN — AZITHROMYCIN MONOHYDRATE 500 MG: 500 INJECTION, POWDER, LYOPHILIZED, FOR SOLUTION INTRAVENOUS at 14:49

## 2024-06-01 RX ADMIN — INSULIN LISPRO 4 UNITS: 100 INJECTION, SOLUTION INTRAVENOUS; SUBCUTANEOUS at 08:50

## 2024-06-01 RX ADMIN — INSULIN LISPRO 6 UNITS: 100 INJECTION, SOLUTION INTRAVENOUS; SUBCUTANEOUS at 17:27

## 2024-06-01 RX ADMIN — METOPROLOL TARTRATE 25 MG: 25 TABLET, FILM COATED ORAL at 08:49

## 2024-06-01 RX ADMIN — GUAIFENESIN 600 MG: 600 TABLET, EXTENDED RELEASE ORAL at 20:24

## 2024-06-01 RX ADMIN — FUROSEMIDE 120 MG: 40 TABLET ORAL at 20:24

## 2024-06-01 RX ADMIN — METOPROLOL TARTRATE 25 MG: 25 TABLET, FILM COATED ORAL at 20:24

## 2024-06-01 RX ADMIN — FUROSEMIDE 120 MG: 40 TABLET ORAL at 08:49

## 2024-06-01 RX ADMIN — IPRATROPIUM BROMIDE AND ALBUTEROL SULFATE 3 ML: .5; 3 SOLUTION RESPIRATORY (INHALATION) at 06:50

## 2024-06-01 RX ADMIN — TOPIRAMATE 50 MG: 25 TABLET, FILM COATED ORAL at 20:24

## 2024-06-01 RX ADMIN — TOPIRAMATE 50 MG: 25 TABLET, FILM COATED ORAL at 08:49

## 2024-06-01 RX ADMIN — ROSUVASTATIN CALCIUM 20 MG: 10 TABLET, FILM COATED ORAL at 20:24

## 2024-06-01 RX ADMIN — WARFARIN SODIUM 12.5 MG: 7.5 TABLET ORAL at 17:52

## 2024-06-01 RX ADMIN — DOCUSATE SODIUM 100 MG: 100 CAPSULE, LIQUID FILLED ORAL at 17:18

## 2024-06-01 RX ADMIN — Medication 1000 UNITS: at 08:49

## 2024-06-01 RX ADMIN — METHYLPREDNISOLONE SODIUM SUCCINATE 40 MG: 40 INJECTION, POWDER, FOR SOLUTION INTRAMUSCULAR; INTRAVENOUS at 08:49

## 2024-06-01 RX ADMIN — TAMSULOSIN HYDROCHLORIDE 0.8 MG: 0.4 CAPSULE ORAL at 20:24

## 2024-06-01 RX ADMIN — DOCUSATE SODIUM 200 MG: 100 CAPSULE, LIQUID FILLED ORAL at 08:49

## 2024-06-01 RX ADMIN — Medication 1 TABLET: at 08:49

## 2024-06-01 RX ADMIN — DIGOXIN 125 MCG: 125 TABLET ORAL at 08:49

## 2024-06-01 RX ADMIN — METHYLPREDNISOLONE SODIUM SUCCINATE 40 MG: 40 INJECTION, POWDER, FOR SOLUTION INTRAMUSCULAR; INTRAVENOUS at 01:29

## 2024-06-01 RX ADMIN — DIPHENHYDRAMINE HYDROCHLORIDE 25 MG: 25 CAPSULE ORAL at 21:55

## 2024-06-01 RX ADMIN — GUAIFENESIN 600 MG: 600 TABLET, EXTENDED RELEASE ORAL at 08:49

## 2024-06-01 RX ADMIN — ONDANSETRON HYDROCHLORIDE 4 MG: 4 TABLET, FILM COATED ORAL at 12:12

## 2024-06-01 RX ADMIN — CEFTRIAXONE SODIUM 2 G: 2 INJECTION, SOLUTION INTRAVENOUS at 14:49

## 2024-06-01 RX ADMIN — IPRATROPIUM BROMIDE AND ALBUTEROL SULFATE 3 ML: .5; 3 SOLUTION RESPIRATORY (INHALATION) at 13:25

## 2024-06-01 RX ADMIN — INSULIN LISPRO 4 UNITS: 100 INJECTION, SOLUTION INTRAVENOUS; SUBCUTANEOUS at 12:13

## 2024-06-01 RX ADMIN — IPRATROPIUM BROMIDE AND ALBUTEROL SULFATE 3 ML: .5; 3 SOLUTION RESPIRATORY (INHALATION) at 18:10

## 2024-06-01 RX ADMIN — LISINOPRIL 2.5 MG: 5 TABLET ORAL at 08:49

## 2024-06-01 RX ADMIN — ACETAMINOPHEN 650 MG: 325 TABLET ORAL at 20:27

## 2024-06-01 RX ADMIN — GABAPENTIN 800 MG: 400 CAPSULE ORAL at 20:24

## 2024-06-01 RX ADMIN — GABAPENTIN 800 MG: 400 CAPSULE ORAL at 08:49

## 2024-06-01 RX ADMIN — DULOXETINE HYDROCHLORIDE 60 MG: 30 CAPSULE, DELAYED RELEASE ORAL at 08:49

## 2024-06-01 ASSESSMENT — COGNITIVE AND FUNCTIONAL STATUS - GENERAL
MOBILITY SCORE: 24
MOBILITY SCORE: 24
DAILY ACTIVITIY SCORE: 24
DAILY ACTIVITIY SCORE: 24

## 2024-06-01 ASSESSMENT — PAIN SCALES - GENERAL
PAINLEVEL_OUTOF10: 0 - NO PAIN
PAINLEVEL_OUTOF10: 0 - NO PAIN

## 2024-06-01 NOTE — CARE PLAN
The patient's goals for the shift include      The clinical goals for the shift include Patient will remain hemodynamically stable throughout this shift.      Problem: Pain  Goal: Performs ADL's with improved pain control throughout shift  Outcome: Progressing     Problem: Pain - Adult  Goal: Verbalizes/displays adequate comfort level or baseline comfort level  Outcome: Progressing

## 2024-06-01 NOTE — CARE PLAN
The patient's goals for the shift include      The clinical goals for the shift include Patient will remain hemodynamically stable throughout this shift.    Problem: Pain  Goal: Takes deep breaths with improved pain control throughout the shift  Outcome: Progressing  Goal: Turns in bed with improved pain control throughout the shift  Outcome: Progressing  Goal: Walks with improved pain control throughout the shift  Outcome: Progressing  Goal: Performs ADL's with improved pain control throughout shift  Outcome: Progressing  Goal: Participates in PT with improved pain control throughout the shift  Outcome: Progressing  Goal: Free from opioid side effects throughout the shift  Outcome: Progressing  Goal: Free from acute confusion related to pain meds throughout the shift  Outcome: Progressing     Problem: Diabetes  Goal: Achieve decreasing blood glucose levels by end of shift  Outcome: Progressing  Goal: Increase stability of blood glucose readings by end of shift  Outcome: Progressing  Goal: Decrease in ketones present in urine by end of shift  Outcome: Progressing  Goal: Maintain electrolyte levels within acceptable range throughout shift  Outcome: Progressing  Goal: Maintain glucose levels >70mg/dl to <250mg/dl throughout shift  Outcome: Progressing  Goal: No changes in neurological exam by end of shift  Outcome: Progressing  Goal: Learn about and adhere to nutrition recommendations by end of shift  Outcome: Progressing  Goal: Vital signs within normal range for age by end of shift  Outcome: Progressing  Goal: Increase self care and/or family involovement by end of shift  Outcome: Progressing  Goal: Receive DSME education by end of shift  Outcome: Progressing

## 2024-06-01 NOTE — PROGRESS NOTES
Nasir Sandoval is a 64 y.o. male on day 1 of admission presenting with Shortness of breath.      Subjective   Patient seen and examined by me.  Patient is sitting in the bed and he is breathing better.  He is also using his CPAP.  Pt has improving cough and remains on IV steroids and IV antibiotics for his hyperactive airway disease/shortness of breath and community-acquired pneumonia.  CT scan of the chest which was ordered yesterday has not been done and radiology will be informed about doing the CT of the chest.  Patient is afebrile.  His left rib cage is also improving.  Vitals and labs noted and he is afebrile.  Blood pressure is stable.  Patient was seen by cardiology Dr. Gamboa and his recommendations noted.       Objective     Last Recorded Vitals  /64   Pulse 86   Temp 36.8 °C (98.2 °F)   Resp 16   Wt (!) 160 kg (351 lb 15.4 oz)   SpO2 95%   Intake/Output last 3 Shifts:    Intake/Output Summary (Last 24 hours) at 5/31/2024 2020  Last data filed at 5/31/2024 1414  Gross per 24 hour   Intake 50 ml   Output 200 ml   Net -150 ml       Admission Weight  Weight: (!) 160 kg (352 lb) (05/30/24 1152)    Daily Weight  05/31/24 : (!) 160 kg (351 lb 15.4 oz)    Image Results  ECG 12 lead  Atrial fibrillation  Left axis deviation  Low voltage QRS  Possible Anterolateral infarct , age undetermined  Abnormal ECG  When compared with ECG of 30-MAY-2024 11:49,  PREVIOUS ECG IS PRESENT      Physical Exam  Vitals and nursing note reviewed.   Constitutional:       General: He is not in acute distress.     Appearance: Normal appearance. He is morbidly obese. He is not ill-appearing or toxic-appearing.   HENT:      Head: Normocephalic and atraumatic.      Right Ear: Tympanic membrane and ear canal normal. There is no impacted cerumen.      Left Ear: Tympanic membrane, ear canal and external ear normal.      Nose: Nose normal. No congestion or rhinorrhea.      Mouth/Throat:      Pharynx: Oropharynx is clear. No  oropharyngeal exudate or posterior oropharyngeal erythema.   Eyes:      General: No scleral icterus.        Right eye: No discharge.         Left eye: No discharge.      Extraocular Movements: Extraocular movements intact.      Conjunctiva/sclera: Conjunctivae normal.      Pupils: Pupils are equal, round, and reactive to light.   Neck:      Vascular: No carotid bruit.   Cardiovascular:      Rate and Rhythm: Normal rate. Rhythm irregularly irregular.      Pulses: Normal pulses.      Heart sounds: Normal heart sounds. No murmur heard.     No gallop.      Comments: Improving bilateral trace pedal edema  Pulmonary:      Effort: Pulmonary effort is normal. No respiratory distress.      Breath sounds: Normal breath sounds. No wheezing, rhonchi or rales.   Abdominal:      General: Abdomen is flat. Bowel sounds are normal. There is no distension.      Palpations: Abdomen is soft. There is no mass.      Tenderness: There is no abdominal tenderness. There is no right CVA tenderness, left CVA tenderness, guarding or rebound.      Hernia: No hernia is present.   Musculoskeletal:         General: No swelling or tenderness. Normal range of motion.      Cervical back: Normal range of motion and neck supple. No rigidity.   Lymphadenopathy:      Cervical: No cervical adenopathy.   Skin:     General: Skin is warm.      Coloration: Skin is not jaundiced.      Findings: No erythema or rash.   Neurological:      General: No focal deficit present.      Mental Status: He is alert and oriented to person, place, and time. Mental status is at baseline.      Sensory: No sensory deficit.      Motor: No weakness.      Gait: Gait normal.      Deep Tendon Reflexes: Reflexes normal.   Psychiatric:         Mood and Affect: Mood normal.         Thought Content: Thought content normal.         Judgment: Judgment normal.         Relevant Results    Current Facility-Administered Medications:     acetaminophen (Tylenol) tablet 650 mg, 650 mg, oral, q4h  PRN, 650 mg at 05/31/24 0902 **OR** acetaminophen (Tylenol) oral liquid 650 mg, 650 mg, oral, q4h PRN, Shon Waller PA-C    albuterol 2.5 mg /3 mL (0.083 %) nebulizer solution 3 mL, 3 mL, nebulization, q2h PRN, Shon Waller PA-C    ammonium lactate (Lac-Hydrin) 12 % lotion 1 Application, 1 Application, Topical, PRN, Shon Waller PA-C    azithromycin (Zithromax) in dextrose 5 % in water (D5W) 250 mL  mg, 500 mg, intravenous, q24h, Jaad Gillespie MD, Stopped at 05/31/24 1538    bacitracin ointment 1 Application, 1 Application, Topical, BID, Shon Waller PA-C    benzonatate (Tessalon) capsule 100 mg, 100 mg, oral, TID PRN, Shon Waller PA-C    cefTRIAXone (Rocephin) 2 g in dextrose 5% in water (D5W) 50 mL, 2 g, intravenous, q24h, Jada Gillespie MD, Stopped at 05/31/24 1414    cholecalciferol (Vitamin D-3) tablet 1,000 Units, 1,000 Units, oral, Daily, Shon Waller PA-C, 1,000 Units at 05/31/24 0906    dextrose 50 % injection 12.5 g, 12.5 g, intravenous, q15 min PRN, Shon Waller PA-C    dextrose 50 % injection 25 g, 25 g, intravenous, q15 min PRN, Shon Waller PA-C    digoxin (Lanoxin) tablet 125 mcg, 125 mcg, oral, Daily, Shon Waller PA-C, 125 mcg at 05/31/24 0902    docusate sodium (Colace) capsule 200 mg, 200 mg, oral, q AM, Shon Waller PA-C, 200 mg at 05/31/24 0900    DULoxetine (Cymbalta) DR capsule 60 mg, 60 mg, oral, Daily, Shon Waller PA-C, 60 mg at 05/31/24 0903    furosemide (Lasix) tablet 120 mg, 120 mg, oral, BID, Shon Waller PA-C, 120 mg at 05/31/24 2009    gabapentin (Neurontin) capsule 800 mg, 800 mg, oral, TID, Shon Waller PA-C, 800 mg at 05/31/24 2008    glucagon (Glucagen) injection 1 mg, 1 mg, intramuscular, q15 min PRN, Shon Waller PA-C    guaiFENesin (Mucinex) 12 hr tablet 600 mg, 600 mg, oral, BID, Cuba Cam MD, 600 mg at 05/31/24 2008    insulin lispro (HumaLOG) injection 0-10 Units, 0-10 Units, subcutaneous,  TID, Shon Waller PA-C, 6 Units at 05/31/24 1719    ipratropium-albuteroL (Duo-Neb) 0.5-2.5 mg/3 mL nebulizer solution 3 mL, 3 mL, nebulization, TID, Cuba Cam MD, 3 mL at 05/31/24 1915    lisinopril tablet 2.5 mg, 2.5 mg, oral, Daily, Shon Waller PA-C, 2.5 mg at 05/31/24 0857    lubricating eye drops ophthalmic solution 1 drop, 1 drop, Left Eye, TID PRN, Shon Waller PA-C    methylPREDNISolone sod succinate (SOLU-Medrol) 40 mg/mL injection 40 mg, 40 mg, intravenous, q8h, Shon Waller PA-C, 40 mg at 05/31/24 1712    metoprolol tartrate (Lopressor) tablet 25 mg, 25 mg, oral, BID, Chacorta Gamboa MD PhD, 25 mg at 05/31/24 2008    multivitamin with minerals 1 tablet, 1 tablet, oral, Daily, Cuba Cam MD, 1 tablet at 05/31/24 0902    ondansetron (Zofran) tablet 4 mg, 4 mg, oral, q6h PRN, 4 mg at 05/31/24 1026 **OR** ondansetron (Zofran) injection 4 mg, 4 mg, intravenous, q6h PRN, Shon Waller PA-C, 4 mg at 05/30/24 1847    oxygen (O2) therapy, , inhalation, Continuous PRN - O2/gases, Shon Waller PA-C    rosuvastatin (Crestor) tablet 20 mg, 20 mg, oral, Nightly, Shon Waller PA-C, 20 mg at 05/31/24 2008    tamsulosin (Flomax) 24 hr capsule 0.8 mg, 0.8 mg, oral, Nightly, Shon Waller PA-C, 0.8 mg at 05/31/24 2008    topiramate (Topamax) tablet 50 mg, 50 mg, oral, BID, Shon Waller PA-C, 50 mg at 05/31/24 2008    warfarin (Coumadin) tablet 10 mg, 10 mg, oral, Every Thursday, Shon Waller PA-C, 10 mg at 05/30/24 1839    warfarin (Coumadin) tablet 12.5 mg, 12.5 mg, oral, Once per day on Sunday Monday Tuesday Wednesday Friday Saturday, Shon Waller PA-C, 12.5 mg at 05/31/24 1715        Results for orders placed or performed during the hospital encounter of 05/30/24 (from the past 96 hour(s))   CBC and Auto Differential   Result Value Ref Range    WBC 8.6 4.4 - 11.3 x10*3/uL    nRBC 0.0 0.0 - 0.0 /100 WBCs    RBC 4.42 (L) 4.50 - 5.90 x10*6/uL    Hemoglobin  13.9 13.5 - 17.5 g/dL    Hematocrit 41.9 41.0 - 52.0 %    MCV 95 80 - 100 fL    MCH 31.4 26.0 - 34.0 pg    MCHC 33.2 32.0 - 36.0 g/dL    RDW 14.9 (H) 11.5 - 14.5 %    Platelets 180 150 - 450 x10*3/uL    Neutrophils % 70.3 40.0 - 80.0 %    Immature Granulocytes %, Automated 0.9 0.0 - 0.9 %    Lymphocytes % 17.0 13.0 - 44.0 %    Monocytes % 9.0 2.0 - 10.0 %    Eosinophils % 2.3 0.0 - 6.0 %    Basophils % 0.5 0.0 - 2.0 %    Neutrophils Absolute 6.04 1.20 - 7.70 x10*3/uL    Immature Granulocytes Absolute, Automated 0.08 0.00 - 0.70 x10*3/uL    Lymphocytes Absolute 1.46 1.20 - 4.80 x10*3/uL    Monocytes Absolute 0.77 0.10 - 1.00 x10*3/uL    Eosinophils Absolute 0.20 0.00 - 0.70 x10*3/uL    Basophils Absolute 0.04 0.00 - 0.10 x10*3/uL   Magnesium   Result Value Ref Range    Magnesium 2.03 1.60 - 2.40 mg/dL   Comprehensive metabolic panel   Result Value Ref Range    Glucose 159 (H) 74 - 99 mg/dL    Sodium 139 136 - 145 mmol/L    Potassium 3.9 3.5 - 5.3 mmol/L    Chloride 101 98 - 107 mmol/L    Bicarbonate 28 21 - 32 mmol/L    Anion Gap 14 10 - 20 mmol/L    Urea Nitrogen 25 (H) 6 - 23 mg/dL    Creatinine 1.17 0.50 - 1.30 mg/dL    eGFR 70 >60 mL/min/1.73m*2    Calcium 9.4 8.6 - 10.3 mg/dL    Albumin 4.3 3.4 - 5.0 g/dL    Alkaline Phosphatase 68 33 - 136 U/L    Total Protein 8.0 6.4 - 8.2 g/dL    AST 21 9 - 39 U/L    Bilirubin, Total 0.4 0.0 - 1.2 mg/dL    ALT 28 10 - 52 U/L   B-Type Natriuretic Peptide   Result Value Ref Range    BNP 18 0 - 99 pg/mL   Blood Gas Venous Full Panel   Result Value Ref Range    POCT pH, Venous 7.38 7.33 - 7.43 pH    POCT pCO2, Venous 49 41 - 51 mm Hg    POCT pO2, Venous 37 35 - 45 mm Hg    POCT SO2, Venous 62 45 - 75 %    POCT Oxy Hemoglobin, Venous 60.7 45.0 - 75.0 %    POCT Hematocrit Calculated, Venous 43.0 41.0 - 52.0 %    POCT Sodium, Venous 138 136 - 145 mmol/L    POCT Potassium, Venous 4.1 3.5 - 5.3 mmol/L    POCT Chloride, Venous 99 98 - 107 mmol/L    POCT Ionized Calicum, Venous 1.15  1.10 - 1.33 mmol/L    POCT Glucose, Venous 165 (H) 74 - 99 mg/dL    POCT Lactate, Venous 2.0 0.4 - 2.0 mmol/L    POCT Base Excess, Venous 2.9 -2.0 - 3.0 mmol/L    POCT HCO3 Calculated, Venous 29.0 (H) 22.0 - 26.0 mmol/L    POCT Hemoglobin, Venous 14.4 13.5 - 17.5 g/dL    POCT Anion Gap, Venous 14.0 10.0 - 25.0 mmol/L    Patient Temperature 37.0 degrees Celsius    FiO2 21 %   Influenza A, and B PCR   Result Value Ref Range    Flu A Result Not Detected Not Detected    Flu B Result Not Detected Not Detected   Sars-CoV-2 PCR   Result Value Ref Range    Coronavirus 2019, PCR Not Detected Not Detected   RSV PCR   Result Value Ref Range    RSV PCR Not Detected Not Detected   Troponin I, High Sensitivity, Initial   Result Value Ref Range    Troponin I, High Sensitivity 11 0 - 20 ng/L   Blood Culture    Specimen: Peripheral Venipuncture; Blood culture   Result Value Ref Range    Blood Culture No growth at 1 day    Blood Culture    Specimen: Peripheral Venipuncture; Blood culture   Result Value Ref Range    Blood Culture No growth at 1 day    ECG 12 lead   Result Value Ref Range    Ventricular Rate 97 BPM    QRS Duration 108 ms    QT Interval 352 ms    QTC Calculation(Bazett) 447 ms    R Axis -75 degrees    T Axis 88 degrees    QRS Count 16 beats    Q Onset 205 ms    T Offset 381 ms    QTC Fredericia 413 ms   Coagulation Screen   Result Value Ref Range    Protime 22.1 (H) 9.8 - 12.8 seconds    INR 1.9 (H) 0.9 - 1.1    aPTT 41 (H) 27 - 38 seconds   POCT GLUCOSE   Result Value Ref Range    POCT Glucose 220 (H) 74 - 99 mg/dL   POCT GLUCOSE   Result Value Ref Range    POCT Glucose 189 (H) 74 - 99 mg/dL   CBC   Result Value Ref Range    WBC 7.6 4.4 - 11.3 x10*3/uL    nRBC 0.0 0.0 - 0.0 /100 WBCs    RBC 4.38 (L) 4.50 - 5.90 x10*6/uL    Hemoglobin 13.6 13.5 - 17.5 g/dL    Hematocrit 42.7 41.0 - 52.0 %    MCV 98 80 - 100 fL    MCH 31.1 26.0 - 34.0 pg    MCHC 31.9 (L) 32.0 - 36.0 g/dL    RDW 14.9 (H) 11.5 - 14.5 %    Platelets 209 150  - 450 x10*3/uL   Basic metabolic panel   Result Value Ref Range    Glucose 247 (H) 74 - 99 mg/dL    Sodium 139 136 - 145 mmol/L    Potassium 4.4 3.5 - 5.3 mmol/L    Chloride 102 98 - 107 mmol/L    Bicarbonate 26 21 - 32 mmol/L    Anion Gap 15 10 - 20 mmol/L    Urea Nitrogen 25 (H) 6 - 23 mg/dL    Creatinine 1.22 0.50 - 1.30 mg/dL    eGFR 67 >60 mL/min/1.73m*2    Calcium 9.2 8.6 - 10.3 mg/dL   POCT GLUCOSE   Result Value Ref Range    POCT Glucose 241 (H) 74 - 99 mg/dL   POCT GLUCOSE   Result Value Ref Range    POCT Glucose 275 (H) 74 - 99 mg/dL   POCT GLUCOSE   Result Value Ref Range    POCT Glucose 255 (H) 74 - 99 mg/dL   POCT GLUCOSE   Result Value Ref Range    POCT Glucose 316 (H) 74 - 99 mg/dL    ECG 12 lead    Result Date: 5/31/2024  Atrial fibrillation Left axis deviation Low voltage QRS Possible Anterolateral infarct , age undetermined Abnormal ECG When compared with ECG of 30-MAY-2024 11:49, PREVIOUS ECG IS PRESENT    XR chest 2 views    Result Date: 5/30/2024  Interpreted By:  Aj Wilde, STUDY: XR CHEST 2 VIEWS;  5/30/2024 1:25 pm   INDICATION: Signs/Symptoms:cough.   COMPARISON: 02/20/2024.   ACCESSION NUMBER(S): [id number]   ORDERING CLINICIAN: MERRICK CONTRERAS   FINDINGS: CARDIOMEDIASTINAL SILHOUETTE: There is stable enlargement of the cardiac silhouette.   LUNGS: There is increased opacification of the right hemithorax with mild diffuse interstitial prominence as well as irregular infiltrates and/or atelectasis in the right perihilar and infrahilar regions. Mild interstitial prominence and basilar atelectasis seen in the left lung. No pneumothorax is seen.   ABDOMEN: No remarkable upper abdominal findings.   BONES: Thoracic dextrocurvature may be partially exaggerated by positioning. Mild endplate spurring present in the spine.       1.  Increased opacification of the right hemithorax with mild diffuse interstitial prominence as well as irregular infiltrates and/or atelectasis of the right perihilar  and infrahilar regions could be related to vascular congestion and/or infection. Component of pleural fluid not excluded.       MACRO: None.   Signed by: Aj Wilde 5/30/2024 1:47 PM Dictation workstation:   [id number]      Assessment/Plan   This patient currently has cardiac telemetry ordered; if you would like to modify or discontinue the telemetry order, click here to go to the orders activity to modify/discontinue the order.    #1/Dyspnea/cough/right lung pneumonia/atelectasis/hyperactive airway disease/remote smoker/morbid obesity and obstructive sleep apnea/patient is using his CPAP.  He remains on IV Solu-Medrol.  Patient also remains on IV Zithromax and Rocephin.  Patient is receiving aerosol treatments and is awaiting CT of the chest.  Clinically he is improving.  Blood cultures are negative x 2.  He is afebrile.  WBC count is normal.  He remains on Tessalon Perles for his cough.    #2/history of chronic CHF/diastolic/EF of 45% in the past/paroxysmal atrial fibrillation/hypertension/moderate left ventricular hypertrophy/BNP was normal on admission.  Patient had trace bilateral edema.  Patient is clinically stable.  He remains on p.o. Coumadin and an INR will be checked in AM again.  3./Left rib cage pain/troponin was negative on admission.  This is most likely secondary to his pneumonia and improving.  4./Rapid atrial fibrillation with RVR on admission and has history of chronic A-fib/history of cardioversions/patient has been seen by cardiology and remains in controlled ventricular response and remains on his Coumadin and digoxin  #5/type 2 diabetes with hyperglycemia worsened by his Solu-Medrol.  Continue AC and at bedtime Accu-Cheks.  Cover with sliding scale of insulin.  6./Deconditioning and weakness/PT OT and up in chair as tolerated.  SCDs and encourage ambulation as tolerated.  Patient remains on p.o. Coumadin for DVT prophylaxis  Patient is clinically slowly improving.  Continue present  level of care.  Case discussed with the nursing on the floor.  Medications reconciled.  Cardiology consultation recommendations noted.  Labs and vitals noted.  Total time spent 40 minutes/time spent on patient interaction/counseling/assessment and plan and documentation    Principal Problem:    Shortness of breath                  Cuba Cam MD

## 2024-06-01 NOTE — PROGRESS NOTES
Subjective Data:  Still overnight.  No acute events.  Heart rates remain well-controlled with current medication regimen.     Objective Data:  Last Recorded Vitals:  Vitals:    06/01/24 0021 06/01/24 0407 06/01/24 0500 06/01/24 0743   BP: 113/61 139/78  105/63   BP Location: Right arm Right arm  Right arm   Patient Position: Lying Sitting  Sitting   Pulse: 66 85  78   Resp: 16 16  16   Temp: 36 °C (96.8 °F) 36.5 °C (97.7 °F)  36 °C (96.8 °F)   TempSrc: Temporal Temporal  Temporal   SpO2: 94% 94%  92%   Weight:   (!) 160 kg (352 lb 11.8 oz)    Height:           Last Labs:  CBC - 5/31/2024:  5:00 AM  7.6 13.6 209    42.7      CMP - 6/1/2024:  5:09 AM  8.6 8.0 21 --- 0.4   3.0 4.3 28 68      PTT - 5/30/2024:  6:29 PM  2.6   29.9 41     TROPHS   Date/Time Value Ref Range Status   05/30/2024 01:31 PM 11 0 - 20 ng/L Final   02/20/2024 04:42 PM 13 0 - 20 ng/L Final     BNP   Date/Time Value Ref Range Status   05/30/2024 01:31 PM 18 0 - 99 pg/mL Final   10/31/2023 11:15 AM 11 0 - 99 pg/mL Final     HGBA1C   Date/Time Value Ref Range Status   01/30/2024 10:52 AM 7.3 see below % Final   08/07/2023 11:35 AM 7.0 % Final     Comment:          Diagnosis of Diabetes-Adults   Non-Diabetic: < or = 5.6%   Increased risk for developing diabetes: 5.7-6.4%   Diagnostic of diabetes: > or = 6.5%  .       Monitoring of Diabetes                Age (y)     Therapeutic Goal (%)   Adults:          >18           <7.0   Pediatrics:    13-18           <7.5                   7-12           <8.0                   0- 6            7.5-8.5   American Diabetes Association. Diabetes Care 33(S1), Jan 2010.   01/17/2023 08:57 AM 7.0 % Final     Comment:          Diagnosis of Diabetes-Adults   Non-Diabetic: < or = 5.6%   Increased risk for developing diabetes: 5.7-6.4%   Diagnostic of diabetes: > or = 6.5%  .       Monitoring of Diabetes                Age (y)     Therapeutic Goal (%)   Adults:          >18           <7.0   Pediatrics:    13-18            "<7.5                   7-12           <8.0                   0- 6            7.5-8.5   American Diabetes Association. Diabetes Care 33(S1), Jan 2010.       VLDL   Date/Time Value Ref Range Status   01/17/2023 08:57 AM 38 0 - 40 mg/dL Final   09/16/2022 08:22 AM SEE COMMENT 0 - 40 mg/dL Final     Comment:       Unable to calculate VLDL.   09/29/2020 09:15 AM 35 0 - 40 mg/dL Final      Last I/O:  I/O last 3 completed shifts:  In: 500 (3.1 mL/kg) [P.O.:200; IV Piggyback:300]  Out: 402 (2.5 mL/kg) [Urine:402 (0.1 mL/kg/hr)]  Weight: 160 kg     Past Cardiology Tests (Last 3 Years):  EKG:  ECG 12 lead 05/30/2024 (Preliminary)      ECG 12 lead 02/20/2024      ECG 12 Lead 11/15/2023    Echo:  Transthoracic Echo (TTE) Complete 02/21/2024    Ejection Fractions:  No results found for: \"EF\"  Cath:  No results found for this or any previous visit from the past 1095 days.    Stress Test:  No results found for this or any previous visit from the past 1095 days.    Cardiac Imaging:  MR CARDIAC MORPHOLOGY AND FUNCTION W AND WO IV CONTRAST 05/26/2022      Inpatient Medications:  Scheduled medications   Medication Dose Route Frequency    azithromycin  500 mg intravenous q24h    bacitracin  1 Application Topical BID    cefTRIAXone  2 g intravenous q24h    cholecalciferol  1,000 Units oral Daily    digoxin  125 mcg oral Daily    docusate sodium  200 mg oral q AM    DULoxetine  60 mg oral Daily    furosemide  120 mg oral BID    gabapentin  800 mg oral TID    guaiFENesin  600 mg oral BID    insulin lispro  0-10 Units subcutaneous TID    ipratropium-albuteroL  3 mL nebulization TID    lisinopril  2.5 mg oral Daily    methylPREDNISolone sodium succinate (PF)  40 mg intravenous q8h    metoprolol tartrate  25 mg oral BID    multivitamin with minerals  1 tablet oral Daily    rosuvastatin  20 mg oral Nightly    tamsulosin  0.8 mg oral Nightly    topiramate  50 mg oral BID    warfarin  10 mg oral Every Thursday    warfarin  12.5 mg oral Once per " day on Sunday Monday Tuesday Wednesday Friday Saturday     PRN medications   Medication    acetaminophen    Or    acetaminophen    albuterol    ammonium lactate    benzonatate    dextrose    dextrose    glucagon    glucagon    lubricating eye drops    ondansetron    Or    ondansetron    oxygen     Continuous Medications   Medication Dose Last Rate       Physical Exam:  General: No acute distress,  A&O x3, obese male  Skin: Warm and dry  Neck: JVD is not elevated  ENT: Moist mucous membranes no lesions appreciated  Pulmonary: Mildly diminished bilateral lung fields  Cards: Regular rate rhythm, no murmurs gallops or rubs appreciated normal S1-S2  Abdomen: Obese, soft nontender nondistended  Extremities: Trace edema noted bilaterally  Psych: Appropriate mood and affect      Assessment/Plan   Atrial fibrillation: Initial presentation of A-fib with RVR.  Heart rates are improved with oral digoxin 125 mcg daily and metoprolol 25 mg twice a day.  Warfarin for CVA prophylaxis.    Heart failure mildly reduced ejection fraction with a EF of 45%: Continue oral Lasix.  Beta-blockade added as discussed above.  Will convert to long-acting formulation at discharge.  Continue lisinopril 2.5 mg once a day.    Community-acquired pneumonia: Receiving oral antibiotics and steroids as per primary team.     (This note was generated with voice recognition software and may contain errors including spelling, grammar, syntax and missed recognition of what was dictated, of which may not have been fully corrected)       Code Status:  DNR and No Intubation    Chacorta Gamboa MD PhD

## 2024-06-02 VITALS
SYSTOLIC BLOOD PRESSURE: 116 MMHG | TEMPERATURE: 97 F | OXYGEN SATURATION: 93 % | WEIGHT: 315 LBS | HEART RATE: 82 BPM | BODY MASS INDEX: 40.43 KG/M2 | DIASTOLIC BLOOD PRESSURE: 66 MMHG | RESPIRATION RATE: 18 BRPM | HEIGHT: 74 IN

## 2024-06-02 LAB
EST. AVERAGE GLUCOSE BLD GHB EST-MCNC: 171 MG/DL
GLUCOSE BLD MANUAL STRIP-MCNC: 197 MG/DL (ref 74–99)
GLUCOSE BLD MANUAL STRIP-MCNC: 291 MG/DL (ref 74–99)
GLUCOSE BLD MANUAL STRIP-MCNC: 294 MG/DL (ref 74–99)
HBA1C MFR BLD: 7.6 %

## 2024-06-02 PROCEDURE — 2500000004 HC RX 250 GENERAL PHARMACY W/ HCPCS (ALT 636 FOR OP/ED): Performed by: STUDENT IN AN ORGANIZED HEALTH CARE EDUCATION/TRAINING PROGRAM

## 2024-06-02 PROCEDURE — 99232 SBSQ HOSP IP/OBS MODERATE 35: CPT | Performed by: STUDENT IN AN ORGANIZED HEALTH CARE EDUCATION/TRAINING PROGRAM

## 2024-06-02 PROCEDURE — 2500000005 HC RX 250 GENERAL PHARMACY W/O HCPCS: Performed by: PHYSICIAN ASSISTANT

## 2024-06-02 PROCEDURE — 2500000001 HC RX 250 WO HCPCS SELF ADMINISTERED DRUGS (ALT 637 FOR MEDICARE OP): Performed by: INTERNAL MEDICINE

## 2024-06-02 PROCEDURE — 2500000001 HC RX 250 WO HCPCS SELF ADMINISTERED DRUGS (ALT 637 FOR MEDICARE OP): Performed by: STUDENT IN AN ORGANIZED HEALTH CARE EDUCATION/TRAINING PROGRAM

## 2024-06-02 PROCEDURE — 2500000002 HC RX 250 W HCPCS SELF ADMINISTERED DRUGS (ALT 637 FOR MEDICARE OP, ALT 636 FOR OP/ED): Performed by: INTERNAL MEDICINE

## 2024-06-02 PROCEDURE — 2500000001 HC RX 250 WO HCPCS SELF ADMINISTERED DRUGS (ALT 637 FOR MEDICARE OP): Performed by: PHYSICIAN ASSISTANT

## 2024-06-02 PROCEDURE — 82947 ASSAY GLUCOSE BLOOD QUANT: CPT

## 2024-06-02 PROCEDURE — 2500000004 HC RX 250 GENERAL PHARMACY W/ HCPCS (ALT 636 FOR OP/ED): Performed by: INTERNAL MEDICINE

## 2024-06-02 PROCEDURE — 2500000002 HC RX 250 W HCPCS SELF ADMINISTERED DRUGS (ALT 637 FOR MEDICARE OP, ALT 636 FOR OP/ED): Performed by: PHYSICIAN ASSISTANT

## 2024-06-02 PROCEDURE — 94640 AIRWAY INHALATION TREATMENT: CPT

## 2024-06-02 RX ORDER — DOXYCYCLINE 100 MG/1
100 CAPSULE ORAL 2 TIMES DAILY
Qty: 10 CAPSULE | Refills: 0 | Status: SHIPPED | OUTPATIENT
Start: 2024-06-02 | End: 2024-06-07

## 2024-06-02 RX ORDER — CHOLECALCIFEROL (VITAMIN D3) 25 MCG
1000 TABLET ORAL DAILY
Start: 2024-06-03

## 2024-06-02 RX ORDER — MULTIVIT-MIN/IRON FUM/FOLIC AC 7.5 MG-4
1 TABLET ORAL DAILY
Start: 2024-06-03

## 2024-06-02 RX ORDER — BACITRACIN 500 [USP'U]/G
1 OINTMENT TOPICAL 2 TIMES DAILY
Start: 2024-06-02

## 2024-06-02 RX ORDER — PREDNISONE 10 MG/1
10 TABLET ORAL DAILY
Qty: 5 TABLET | Refills: 0 | Status: SHIPPED | OUTPATIENT
Start: 2024-06-02 | End: 2024-06-07

## 2024-06-02 RX ORDER — BENZONATATE 100 MG/1
100 CAPSULE ORAL 3 TIMES DAILY PRN
Qty: 20 CAPSULE | Refills: 0 | Status: SHIPPED | OUTPATIENT
Start: 2024-06-02

## 2024-06-02 RX ORDER — METOPROLOL TARTRATE 25 MG/1
25 TABLET, FILM COATED ORAL 2 TIMES DAILY
Qty: 180 TABLET | Refills: 0 | Status: SHIPPED | OUTPATIENT
Start: 2024-06-02 | End: 2024-08-31

## 2024-06-02 RX ORDER — GUAIFENESIN 600 MG/1
600 TABLET, EXTENDED RELEASE ORAL 2 TIMES DAILY
Qty: 60 TABLET | Refills: 0 | Status: SHIPPED | OUTPATIENT
Start: 2024-06-02 | End: 2024-07-02

## 2024-06-02 RX ADMIN — METHYLPREDNISOLONE SODIUM SUCCINATE 40 MG: 40 INJECTION, POWDER, FOR SOLUTION INTRAMUSCULAR; INTRAVENOUS at 06:11

## 2024-06-02 RX ADMIN — GABAPENTIN 800 MG: 400 CAPSULE ORAL at 08:04

## 2024-06-02 RX ADMIN — Medication 1 TABLET: at 08:05

## 2024-06-02 RX ADMIN — TOPIRAMATE 50 MG: 25 TABLET, FILM COATED ORAL at 08:04

## 2024-06-02 RX ADMIN — ACETAMINOPHEN 650 MG: 325 TABLET ORAL at 14:49

## 2024-06-02 RX ADMIN — WARFARIN SODIUM 12.5 MG: 7.5 TABLET ORAL at 17:38

## 2024-06-02 RX ADMIN — DOCUSATE SODIUM 200 MG: 100 CAPSULE, LIQUID FILLED ORAL at 08:05

## 2024-06-02 RX ADMIN — INSULIN LISPRO 6 UNITS: 100 INJECTION, SOLUTION INTRAVENOUS; SUBCUTANEOUS at 17:38

## 2024-06-02 RX ADMIN — ONDANSETRON HYDROCHLORIDE 4 MG: 4 TABLET, FILM COATED ORAL at 07:54

## 2024-06-02 RX ADMIN — DIGOXIN 125 MCG: 125 TABLET ORAL at 08:05

## 2024-06-02 RX ADMIN — CEFTRIAXONE SODIUM 2 G: 2 INJECTION, SOLUTION INTRAVENOUS at 14:48

## 2024-06-02 RX ADMIN — AZITHROMYCIN MONOHYDRATE 500 MG: 500 INJECTION, POWDER, LYOPHILIZED, FOR SOLUTION INTRAVENOUS at 14:48

## 2024-06-02 RX ADMIN — LISINOPRIL 2.5 MG: 5 TABLET ORAL at 08:04

## 2024-06-02 RX ADMIN — FUROSEMIDE 120 MG: 40 TABLET ORAL at 08:04

## 2024-06-02 RX ADMIN — ONDANSETRON HYDROCHLORIDE 4 MG: 4 TABLET, FILM COATED ORAL at 15:15

## 2024-06-02 RX ADMIN — METOPROLOL TARTRATE 25 MG: 25 TABLET, FILM COATED ORAL at 08:05

## 2024-06-02 RX ADMIN — DULOXETINE HYDROCHLORIDE 60 MG: 30 CAPSULE, DELAYED RELEASE ORAL at 08:04

## 2024-06-02 RX ADMIN — GUAIFENESIN 600 MG: 600 TABLET, EXTENDED RELEASE ORAL at 08:04

## 2024-06-02 RX ADMIN — GABAPENTIN 800 MG: 400 CAPSULE ORAL at 14:48

## 2024-06-02 RX ADMIN — Medication 1000 UNITS: at 08:05

## 2024-06-02 RX ADMIN — IPRATROPIUM BROMIDE AND ALBUTEROL SULFATE 3 ML: .5; 3 SOLUTION RESPIRATORY (INHALATION) at 06:46

## 2024-06-02 RX ADMIN — INSULIN LISPRO 6 UNITS: 100 INJECTION, SOLUTION INTRAVENOUS; SUBCUTANEOUS at 12:25

## 2024-06-02 RX ADMIN — IPRATROPIUM BROMIDE AND ALBUTEROL SULFATE 3 ML: .5; 3 SOLUTION RESPIRATORY (INHALATION) at 12:59

## 2024-06-02 RX ADMIN — INSULIN LISPRO 2 UNITS: 100 INJECTION, SOLUTION INTRAVENOUS; SUBCUTANEOUS at 08:05

## 2024-06-02 RX ADMIN — METHYLPREDNISOLONE SODIUM SUCCINATE 40 MG: 40 INJECTION, POWDER, FOR SOLUTION INTRAMUSCULAR; INTRAVENOUS at 17:38

## 2024-06-02 ASSESSMENT — COGNITIVE AND FUNCTIONAL STATUS - GENERAL
MOBILITY SCORE: 24
DAILY ACTIVITIY SCORE: 24

## 2024-06-02 ASSESSMENT — PAIN SCALES - GENERAL: PAINLEVEL_OUTOF10: 0 - NO PAIN

## 2024-06-02 NOTE — PROGRESS NOTES
Subjective Data:  Stable night.  Heart rates remain well-controlled.  Overall symptomatically patient has improved.     Objective Data:  Last Recorded Vitals:  Vitals:    06/01/24 2349 06/02/24 0422 06/02/24 0646 06/02/24 0749   BP: 107/74 111/73  138/69   BP Location:    Left arm   Patient Position:    Lying   Pulse: 66 82  95   Resp: 18 18  18   Temp: 35.6 °C (96.1 °F) 36 °C (96.8 °F)  36.3 °C (97.3 °F)   TempSrc: Temporal Temporal  Temporal   SpO2: 93% 93% 90% 92%   Weight:       Height:           Last Labs:  CBC - 5/31/2024:  5:00 AM  7.6 13.6 209    42.7      CMP - 6/1/2024:  5:09 AM  8.6 8.0 21 --- 0.4   3.0 4.3 28 68      PTT - 5/30/2024:  6:29 PM  2.6   29.9 41     TROPHS   Date/Time Value Ref Range Status   05/30/2024 01:31 PM 11 0 - 20 ng/L Final   02/20/2024 04:42 PM 13 0 - 20 ng/L Final     BNP   Date/Time Value Ref Range Status   05/30/2024 01:31 PM 18 0 - 99 pg/mL Final   10/31/2023 11:15 AM 11 0 - 99 pg/mL Final     HGBA1C   Date/Time Value Ref Range Status   01/30/2024 10:52 AM 7.3 see below % Final   08/07/2023 11:35 AM 7.0 % Final     Comment:          Diagnosis of Diabetes-Adults   Non-Diabetic: < or = 5.6%   Increased risk for developing diabetes: 5.7-6.4%   Diagnostic of diabetes: > or = 6.5%  .       Monitoring of Diabetes                Age (y)     Therapeutic Goal (%)   Adults:          >18           <7.0   Pediatrics:    13-18           <7.5                   7-12           <8.0                   0- 6            7.5-8.5   American Diabetes Association. Diabetes Care 33(S1), Jan 2010.   01/17/2023 08:57 AM 7.0 % Final     Comment:          Diagnosis of Diabetes-Adults   Non-Diabetic: < or = 5.6%   Increased risk for developing diabetes: 5.7-6.4%   Diagnostic of diabetes: > or = 6.5%  .       Monitoring of Diabetes                Age (y)     Therapeutic Goal (%)   Adults:          >18           <7.0   Pediatrics:    13-18           <7.5                   7-12           <8.0                    "0- 6            7.5-8.5   American Diabetes Association. Diabetes Care 33(S1), Jan 2010.       VLDL   Date/Time Value Ref Range Status   01/17/2023 08:57 AM 38 0 - 40 mg/dL Final   09/16/2022 08:22 AM SEE COMMENT 0 - 40 mg/dL Final     Comment:       Unable to calculate VLDL.   09/29/2020 09:15 AM 35 0 - 40 mg/dL Final      Last I/O:  I/O last 3 completed shifts:  In: 200 (1.3 mL/kg) [P.O.:200]  Out: 2 (0 mL/kg) [Urine:2 (0 mL/kg/hr)]  Weight: 160 kg     Past Cardiology Tests (Last 3 Years):  EKG:  ECG 12 lead 05/30/2024 (Preliminary)      ECG 12 lead 02/20/2024      ECG 12 Lead 11/15/2023    Echo:  Transthoracic Echo (TTE) Complete 02/21/2024    Ejection Fractions:  No results found for: \"EF\"  Cath:  No results found for this or any previous visit from the past 1095 days.    Stress Test:  No results found for this or any previous visit from the past 1095 days.    Cardiac Imaging:  MR CARDIAC MORPHOLOGY AND FUNCTION W AND WO IV CONTRAST 05/26/2022      Inpatient Medications:  Scheduled medications   Medication Dose Route Frequency    azithromycin  500 mg intravenous q24h    bacitracin  1 Application Topical BID    cefTRIAXone  2 g intravenous q24h    cholecalciferol  1,000 Units oral Daily    digoxin  125 mcg oral Daily    docusate sodium  200 mg oral q AM    DULoxetine  60 mg oral Daily    furosemide  120 mg oral BID    gabapentin  800 mg oral TID    guaiFENesin  600 mg oral BID    insulin lispro  0-10 Units subcutaneous TID    ipratropium-albuteroL  3 mL nebulization TID    lisinopril  2.5 mg oral Daily    methylPREDNISolone sodium succinate (PF)  40 mg intravenous q12h    metoprolol tartrate  25 mg oral BID    multivitamin with minerals  1 tablet oral Daily    rosuvastatin  20 mg oral Nightly    tamsulosin  0.8 mg oral Nightly    topiramate  50 mg oral BID    warfarin  10 mg oral Every Thursday    warfarin  12.5 mg oral Once per day on Sunday Monday Tuesday Wednesday Friday Saturday     PRN medications "   Medication    acetaminophen    Or    acetaminophen    albuterol    ammonium lactate    benzonatate    dextrose    dextrose    diphenhydrAMINE    glucagon    glucagon    lubricating eye drops    ondansetron    Or    ondansetron    oxygen     Continuous Medications   Medication Dose Last Rate       Physical Exam:  General: No acute distress,  A&O x3, obese male  Skin: Warm and dry  Neck: JVD is not elevated  ENT: Moist mucous membranes no lesions appreciated  Pulmonary: Mildly diminished bilateral lung fields  Cards: Regular rate rhythm, no murmurs gallops or rubs appreciated normal S1-S2  Abdomen: Obese, soft nontender nondistended  Extremities: Trace edema noted bilaterally  Psych: Appropriate mood and affect      Assessment/Plan   Atrial fibrillation: Initial presentation of A-fib with RVR.  Heart rates are improved with oral digoxin 125 mcg daily and metoprolol 25 mg twice a day.  Warfarin for CVA prophylaxis.     Heart failure mildly reduced ejection fraction with a EF of 45%: Continue oral Lasix.  Beta-blockade added as discussed above.  Can to long-acting formulation at discharge.  Continue lisinopril 2.5 mg once a day.     Community-acquired pneumonia: Receiving oral antibiotics and steroids as per primary team.    Overall patient appears stable from a cardiovascular perspective for discharge.  He will need to follow-up with his cardiologist Dr. Milan within 2 to 4 weeks for further management.     (This note was generated with voice recognition software and may contain errors including spelling, grammar, syntax and missed recognition of what was dictated, of which may not have been fully corrected)     Code Status:  DNR and No Intubation    Chacorta Gamboa MD PhD

## 2024-06-02 NOTE — CARE PLAN
The patient's goals for the shift include      The clinical goals for the shift include rest      Problem: Safety - Adult  Goal: Free from fall injury  Outcome: Progressing     Problem: Pain  Goal: Takes deep breaths with improved pain control throughout the shift  Outcome: Progressing     Problem: Pain  Goal: Performs ADL's with improved pain control throughout shift  Outcome: Progressing

## 2024-06-02 NOTE — DISCHARGE SUMMARY
Discharge Diagnosis  Shortness of breath  #1/right lung community-acquired pneumonia/acute bronchitis/chest x-ray on admission was consistent with right lung infiltrates/increased opacification of the right hemithorax with mild diffuse interstitial prominence as well as irregular infiltrate/atelectasis in the right perihilar and infrahilar regions and bibasilar atelectasis/no pneumothorax was seen.  And CT scan of the chest done without IV contrast showed no infiltrates or congestion.  Patient was treated for suspected right lung pneumonia and not improving on p.o. antibiotics in the outpatient with IV Rocephin and Zithromax and aerosol treatments and IV Solu-Medrol on admission.  He was discharged on p.o. doxycycline 100 mg p.o. twice daily for 5 more days and was given prednisone 10 mg p.o. daily for 5 more days and his wheezing had resolved.  Patient is not a current smoker and had quit smoking more than 40 years ago.  Patient did not require any oxygen supplementation during his hospitalization.  2./Chronic atrial fibrillation/patient was in rapid A-fib with RVR on admission and was seen by cardiology and his heart rate improved with metoprolol 25 mg p.o. twice daily and was continued on his oral digoxin 125 mcg p.o. daily.  Patient also was continued on his home dosage of Coumadin and [facility] and was advised to check a PT/INR in 1 week and his INR yesterday was 2.6.  3./History of CHF with decreased ejection fraction of 45%.  Patient was continued on his home dosage of oral Lasix and his potassium supplementation.  BNP on admission was stable at 18.  Patient has been advised to follow-up with Dr. Milan who is his cardiologist in 2 to 4 weeks for further management and treatment.  4./Morbid obesity patient advised on no added salt diet and calorie/carbohydrate controlled diet and losing weight.  5./Obstructive sleep apnea patient is using CPAP at bedtime.  He is not on any home  oxygen.  6/type 2 diabetes/last A1c on file was from 1/2024 and was 7.3.  Patient is followed by the [facility] physicians and was continued on his metformin and glipizide as his home dosages.  Patient advised on losing weight and calorie/carbohydrate controlled diet.  7./  Issues Requiring Follow-Up      Discharge Meds     Your medication list        START taking these medications        Instructions Last Dose Given Next Dose Due   benzonatate 100 mg capsule  Commonly known as: Tessalon      Take 1 capsule (100 mg) by mouth 3 times a day as needed for cough. Do not crush or chew.       doxycycline 100 mg capsule  Commonly known as: Vibramycin      Take 1 capsule (100 mg) by mouth 2 times a day for 5 days. Take with at least 8 ounces (large glass) of water, do not lie down for 30 minutes after       guaiFENesin 600 mg 12 hr tablet  Commonly known as: Mucinex      Take 1 tablet (600 mg) by mouth 2 times a day. Do not crush, chew, or split.       lubricating eye drops ophthalmic solution      Administer 1 drop into the left eye 3 times a day as needed for dry eyes.       metoprolol tartrate 25 mg tablet  Commonly known as: Lopressor      Take 1 tablet (25 mg) by mouth 2 times a day.       multivitamin with minerals tablet  Start taking on: Sherry 3, 2024      Take 1 tablet by mouth once daily.       predniSONE 10 mg tablet  Commonly known as: Deltasone      Take 1 tablet (10 mg) by mouth once daily for 5 days.              CHANGE how you take these medications        Instructions Last Dose Given Next Dose Due   bacitracin 500 unit/gram ointment  What changed: See the new instructions.      Apply 1 Application topically 2 times a day.       cholecalciferol 25 MCG (1000 UT) tablet  Commonly known as: Vitamin D-3  Start taking on: Sherry 3, 2024  What changed: how much to take      Take 1 tablet (1,000 Units) by mouth once daily.       gabapentin 400 mg capsule  Commonly known as: Neurontin  What changed: Another medication  with the same name was removed. Continue taking this medication, and follow the directions you see here.                  CONTINUE taking these medications        Instructions Last Dose Given Next Dose Due   albuterol 90 mcg/actuation inhaler           ammonium lactate 12 % cream  Commonly known as: Amlactin           carboxymethylcellulose 0.5 % ophthalmic solution  Commonly known as: Refresh Plus           cetirizine 5 mg tablet  Commonly known as: ZyrTEC           clindamycin 1 % external solution  Commonly known as: Cleocin T           digoxin 125 MCG tablet  Commonly known as: Lanoxin           docusate sodium 100 mg capsule  Commonly known as: Colace           DULoxetine 60 mg DR capsule  Commonly known as: Cymbalta           Fish OiL 1,000 mg (120 mg-180 mg) capsule  Generic drug: omega 3-dha-epa-fish oil           FISH OIL ORAL           furosemide 40 mg tablet  Commonly known as: Lasix           glipiZIDE 10 mg tablet  Commonly known as: Glucotrol           ketoconazole 2 % shampoo  Commonly known as: NIZOral           KRILL OIL ORAL           lisinopril 2.5 mg tablet           magnesium oxide 400 mg (241.3 mg magnesium) tablet  Commonly known as: Mag-Ox           METAMUCIL ORAL           metFORMIN 1,000 mg tablet  Commonly known as: Glucophage           multivitamin tablet           potassium chloride CR 20 mEq ER tablet  Commonly known as: Klor-Con M20           potassium citrate CR 5 mEq ER tablet  Commonly known as: Urocit-K-5           psyllium 3.4 gram packet  Commonly known as: Metamucil           rosuvastatin 20 mg tablet  Commonly known as: Crestor           tamsulosin 0.4 mg 24 hr capsule  Commonly known as: Flomax           topiramate 50 mg tablet  Commonly known as: Topamax      Take 1 tablet (50 mg) by mouth 2 times a day.       warfarin 10 mg tablet  Commonly known as: Coumadin           warfarin 5 mg tablet  Commonly known as: Coumadin                  STOP taking these medications       cyclobenzaprine 5 mg tablet  Commonly known as: Flexeril        vitamin D3-vitamin K2 (MK4) 1,000-100 unit-mcg tablet                  Where to Get Your Medications        These medications were sent to [facility] [place] - [address]  [address], [place] [address]      Phone: [phone]   benzonatate 100 mg capsule  doxycycline 100 mg capsule  guaiFENesin 600 mg 12 hr tablet  metoprolol tartrate 25 mg tablet  predniSONE 10 mg tablet       Information about where to get these medications is not yet available    Ask your nurse or doctor about these medications  bacitracin 500 unit/gram ointment  cholecalciferol 25 MCG (1000 UT) tablet  lubricating eye drops ophthalmic solution  multivitamin with minerals tablet         Test Results Pending At Discharge  Pending Labs       Order Current Status    Troponin, High Sensitivity, 1 Hour Collected (05/30/24 1438)    Troponin I Series, High Sensitivity (0, 1 HR) In process    Blood Culture Preliminary result    Blood Culture Preliminary result            Hospital Course     This is a 64 years old pleasant male who gets his [facility] medical care at [facility]/follows with cardiology Dr. Milan as an outpatient/has history of morbid obesity/CHF with decreased EF of 45%/chronic atrial fibrillation and remains on p.o. Coumadin/type 2 diabetes remains on oral hypoglycemics and obstructive sleep apnea and using CPAP at bedtime.  Patient was admitted with increased shortness of breath associated with a productive colored phlegm cough ongoing for 10 days or longer and was prescribed p.o. Augmentin as an outpatient and not improving.  He was seen by Dr. Milan for his appointment with cardiology and was sent to the ED for further evaluation as his lung exam showed bilateral rhonchi and wheezing.  Patient was evaluated in the ED and chest x-ray initially showed right lung pneumonia and infiltrates and was admitted to the telemetry floor with community-acquired  pneumonia and rapid atrial fibrillation with RVR and was started on IV Rocephin and Zithromax Solu-Medrol and aerosol treatments and cardiology consultation was obtained.  Patient clinically started to improve.  He was also given aerosol treatments.  He did not require supplemental oxygen.  His rapid atrial fibrillation improved and he was continued on his digoxin and metoprolol 25 mg p.o. twice daily.  Patient was seen by cardiology.  Patient did not have any pedal edema and he was resumed on his home dosage of Lasix for his history of CHF with decreased ejection fraction.  Patient's shortness of breath and cough started to improve and his wheezing and rhonchi improved.  CT of the chest was done which did not show any infiltrates or congestion.  Patient was discharged home in stable condition [facility] 2 weeks and to follow-up with cardiology in 2 to 4 weeks and was discharged on p.o. doxycycline as above and  prednisone 10 mg p.o. daily for 5 more days.  Clinical condition at the time of discharge was much improved and stable.  Total time spent on patient interaction/counseling/assessment and plan and documentation 45 minutes/case discussed with the nursing and time also spent on coordination of discharge planning.  Pertinent Physical Exam At Time of Discharge  Physical Exam  Vitals and nursing note reviewed.   Constitutional:       General: He is not in acute distress.     Appearance: Normal appearance. He is morbidly obese. He is not ill-appearing or toxic-appearing.   HENT:      Head: Normocephalic and atraumatic.      Right Ear: Tympanic membrane and ear canal normal. There is no impacted cerumen.      Left Ear: Tympanic membrane, ear canal and external ear normal.      Nose: Nose normal. No congestion or rhinorrhea.      Mouth/Throat:      Pharynx: Oropharynx is clear. No oropharyngeal exudate or posterior oropharyngeal erythema.   Eyes:      General: No scleral icterus.         Right eye: No discharge.         Left eye: No discharge.      Extraocular Movements: Extraocular movements intact.      Conjunctiva/sclera: Conjunctivae normal.      Pupils: Pupils are equal, round, and reactive to light.   Neck:      Vascular: No carotid bruit.   Cardiovascular:      Rate and Rhythm: Normal rate. Rhythm irregularly irregular.      Pulses: Normal pulses.      Heart sounds: No murmur heard.     No gallop.   Pulmonary:      Effort: Pulmonary effort is normal. No accessory muscle usage or respiratory distress.      Breath sounds: Normal breath sounds and air entry. No decreased breath sounds, wheezing, rhonchi or rales.   Abdominal:      General: Abdomen is flat. Bowel sounds are normal. There is no distension.      Palpations: Abdomen is soft. There is no mass.      Tenderness: There is no abdominal tenderness. There is no right CVA tenderness, left CVA tenderness, guarding or rebound.      Hernia: No hernia is present.   Musculoskeletal:         General: No swelling or tenderness. Normal range of motion.      Cervical back: Normal range of motion and neck supple. No rigidity.      Right lower leg: No edema.      Left lower leg: No edema.   Lymphadenopathy:      Cervical: No cervical adenopathy.   Skin:     General: Skin is warm.      Coloration: Skin is not jaundiced.      Findings: No erythema or rash.   Neurological:      General: No focal deficit present.      Mental Status: He is alert and oriented to person, place, and time. Mental status is at baseline.      Sensory: No sensory deficit.      Motor: No weakness.      Gait: Gait normal.      Deep Tendon Reflexes: Reflexes normal.   Psychiatric:         Mood and Affect: Mood normal.         Thought Content: Thought content normal.         Judgment: Judgment normal.         Outpatient Follow-Up  Future Appointments   Date Time Provider Department [place]   6/4/2024 12:30 PM [facility] NEURODG [facility] EQUIP [facility]   6/25/2024  9:00 AM Dutch Milan  MD [id number] [place]   8/6/2024  8:30 AM Radha Irvin MD PhD [id number] [place]         Cuba Cam MD

## 2024-06-02 NOTE — CARE PLAN
Problem: Pain  Goal: Takes deep breaths with improved pain control throughout the shift  Outcome: Progressing  Goal: Turns in bed with improved pain control throughout the shift  Outcome: Progressing  Goal: Walks with improved pain control throughout the shift  Outcome: Progressing  Goal: Performs ADL's with improved pain control throughout shift  Outcome: Progressing  Goal: Participates in PT with improved pain control throughout the shift  Outcome: Progressing  Goal: Free from opioid side effects throughout the shift  Outcome: Progressing  Goal: Free from acute confusion related to pain meds throughout the shift  Outcome: Progressing     Problem: Diabetes  Goal: Achieve decreasing blood glucose levels by end of shift  Outcome: Progressing  Goal: Increase stability of blood glucose readings by end of shift  Outcome: Progressing  Goal: Decrease in ketones present in urine by end of shift  Outcome: Progressing  Goal: Maintain electrolyte levels within acceptable range throughout shift  Outcome: Progressing  Goal: Maintain glucose levels >70mg/dl to <250mg/dl throughout shift  Outcome: Progressing  Goal: No changes in neurological exam by end of shift  Outcome: Progressing  Goal: Learn about and adhere to nutrition recommendations by end of shift  Outcome: Progressing  Goal: Vital signs within normal range for age by end of shift  Outcome: Progressing  Goal: Increase self care and/or family involovement by end of shift  Outcome: Progressing  Goal: Receive DSME education by end of shift  Outcome: Progressing     Problem: Pain - Adult  Goal: Verbalizes/displays adequate comfort level or baseline comfort level  Outcome: Progressing     Problem: Safety - Adult  Goal: Free from fall injury  Outcome: Progressing     Problem: Discharge Planning  Goal: Discharge to home or other facility with appropriate resources  Outcome: Progressing     Problem: Chronic Conditions and Co-morbidities  Goal: Patient's chronic conditions  and co-morbidity symptoms are monitored and maintained or improved  Outcome: Progressing   The patient's goals for the shift include      The clinical goals for the shift include rest    Over the shift, the patient did not make progress toward the following goals. Barriers to progression include . Recommendations to address these barriers include .

## 2024-06-03 LAB
BACTERIA BLD CULT: NORMAL
BACTERIA BLD CULT: NORMAL

## 2024-06-03 PROCEDURE — 93005 ELECTROCARDIOGRAM TRACING: CPT

## 2024-06-04 ENCOUNTER — APPOINTMENT (OUTPATIENT)
Dept: NEUROLOGY | Facility: HOSPITAL | Age: 64
End: 2024-06-04
Payer: MEDICARE

## 2024-06-04 VITALS
DIASTOLIC BLOOD PRESSURE: 80 MMHG | BODY MASS INDEX: 40.43 KG/M2 | HEART RATE: 110 BPM | WEIGHT: 315 LBS | HEIGHT: 74 IN | SYSTOLIC BLOOD PRESSURE: 140 MMHG | OXYGEN SATURATION: 94 %

## 2024-06-04 PROBLEM — J22 ACUTE LOWER RESPIRATORY INFECTION: Status: ACTIVE | Noted: 2024-06-04

## 2024-06-04 RX ORDER — SPIRONOLACTONE 25 MG/1
25 TABLET ORAL DAILY
Qty: 30 TABLET | Refills: 11 | Status: SHIPPED | OUTPATIENT
Start: 2024-06-04 | End: 2025-06-04

## 2024-06-04 NOTE — ASSESSMENT & PLAN NOTE
2/21/24 echocardiogram LVEf = 45%, moderate LVH, moderate LAE frank mild to moderate aortic dilation  
Declines

## 2024-06-08 LAB
ATRIAL RATE: 146 BPM
PR INTERVAL: 115 MS
Q ONSET: 205 MS
Q ONSET: 254 MS
QRS COUNT: 16 BEATS
QRS COUNT: 16 BEATS
QRS DURATION: 107 MS
QRS DURATION: 108 MS
QT INTERVAL: 352 MS
QT INTERVAL: 354 MS
QTC CALCULATION(BAZETT): 447 MS
QTC CALCULATION(BAZETT): 455 MS
QTC FREDERICIA: 413 MS
QTC FREDERICIA: 418 MS
R AXIS: -75 DEGREES
R AXIS: -78 DEGREES
T AXIS: 88 DEGREES
T AXIS: 88 DEGREES
T OFFSET: 381 MS
T OFFSET: 431 MS
VENTRICULAR RATE: 97 BPM
VENTRICULAR RATE: 99 BPM

## 2024-06-11 PROBLEM — I95.9 HYPOTENSION: Status: ACTIVE | Noted: 2024-06-11

## 2024-06-18 ENCOUNTER — LAB (OUTPATIENT)
Dept: LAB | Facility: LAB | Age: 64
End: 2024-06-18
Payer: MEDICARE

## 2024-06-18 DIAGNOSIS — Z79.01 LONG TERM (CURRENT) USE OF ANTICOAGULANTS: ICD-10-CM

## 2024-06-18 LAB
INR PPP: 1.9 (ref 0.9–1.1)
PROTHROMBIN TIME: 21 SECONDS (ref 9.8–12.8)

## 2024-06-18 PROCEDURE — 85610 PROTHROMBIN TIME: CPT

## 2024-06-18 PROCEDURE — 36415 COLL VENOUS BLD VENIPUNCTURE: CPT

## 2024-06-25 ENCOUNTER — APPOINTMENT (OUTPATIENT)
Dept: CARDIOLOGY | Facility: CLINIC | Age: 64
End: 2024-06-25
Payer: MEDICARE

## 2024-07-03 PROBLEM — E11.9 DIABETES MELLITUS (MULTI): Status: RESOLVED | Noted: 2022-10-18 | Resolved: 2024-07-03

## 2024-07-10 ENCOUNTER — LAB (OUTPATIENT)
Dept: LAB | Facility: LAB | Age: 64
End: 2024-07-10
Payer: MEDICARE

## 2024-07-10 DIAGNOSIS — Z79.01 LONG TERM (CURRENT) USE OF ANTICOAGULANTS: ICD-10-CM

## 2024-07-10 LAB
INR PPP: 2.5 (ref 0.9–1.1)
PROTHROMBIN TIME: 28.6 SECONDS (ref 9.8–12.8)

## 2024-07-10 PROCEDURE — 85610 PROTHROMBIN TIME: CPT

## 2024-07-10 PROCEDURE — 36415 COLL VENOUS BLD VENIPUNCTURE: CPT

## 2024-07-23 ENCOUNTER — APPOINTMENT (OUTPATIENT)
Dept: CARDIOLOGY | Facility: CLINIC | Age: 64
End: 2024-07-23
Payer: MEDICARE

## 2024-07-23 VITALS
HEART RATE: 123 BPM | WEIGHT: 315 LBS | DIASTOLIC BLOOD PRESSURE: 80 MMHG | BODY MASS INDEX: 40.43 KG/M2 | SYSTOLIC BLOOD PRESSURE: 150 MMHG | OXYGEN SATURATION: 95 % | HEIGHT: 74 IN

## 2024-07-23 DIAGNOSIS — I77.810 DILATION OF THORACIC AORTA (CMS-HCC): ICD-10-CM

## 2024-07-23 DIAGNOSIS — E11.9 TYPE 2 DIABETES MELLITUS WITHOUT COMPLICATION, WITHOUT LONG-TERM CURRENT USE OF INSULIN (MULTI): ICD-10-CM

## 2024-07-23 DIAGNOSIS — I48.19 PERSISTENT ATRIAL FIBRILLATION (MULTI): Primary | ICD-10-CM

## 2024-07-23 DIAGNOSIS — S05.71XD: ICD-10-CM

## 2024-07-23 DIAGNOSIS — E66.01 MORBID OBESITY (MULTI): ICD-10-CM

## 2024-07-23 DIAGNOSIS — I50.32 CHRONIC HEART FAILURE WITH PRESERVED EJECTION FRACTION (MULTI): ICD-10-CM

## 2024-07-23 DIAGNOSIS — G47.33 OBSTRUCTIVE SLEEP APNEA OF ADULT: ICD-10-CM

## 2024-07-23 PROCEDURE — 4010F ACE/ARB THERAPY RXD/TAKEN: CPT | Performed by: INTERNAL MEDICINE

## 2024-07-23 PROCEDURE — 3079F DIAST BP 80-89 MM HG: CPT | Performed by: INTERNAL MEDICINE

## 2024-07-23 PROCEDURE — 3051F HG A1C>EQUAL 7.0%<8.0%: CPT | Performed by: INTERNAL MEDICINE

## 2024-07-23 PROCEDURE — 3060F POS MICROALBUMINURIA REV: CPT | Performed by: INTERNAL MEDICINE

## 2024-07-23 PROCEDURE — 3077F SYST BP >= 140 MM HG: CPT | Performed by: INTERNAL MEDICINE

## 2024-07-23 PROCEDURE — 99214 OFFICE O/P EST MOD 30 MIN: CPT | Performed by: INTERNAL MEDICINE

## 2024-07-23 PROCEDURE — 93000 ELECTROCARDIOGRAM COMPLETE: CPT | Performed by: INTERNAL MEDICINE

## 2024-07-23 PROCEDURE — 3008F BODY MASS INDEX DOCD: CPT | Performed by: INTERNAL MEDICINE

## 2024-07-23 NOTE — PROGRESS NOTES
"  Subjective  Nasir Sandoval  is a 64 y.o. year old male who presents for paroxysmal atrial fibrillation.  Nauseated on metoprolol with low BP resolved.  HR in 80's at home but was aggravated when arrived her today due to traffic experience.  Admitted [facility] discharged 6/2/24 with acute right lung pneumonia    Blood pressure 150/80, pulse (!) 123, height 1.88 m (6' 2\"), weight (!) 159 kg (351 lb), SpO2 95%.   Penicillins  Past Medical History:   Diagnosis Date    VALERIANO (acute kidney injury) (CMS-HCC) 05/15/2012    Avulsion of left eye, initial encounter 11/30/2018    Traumatic enucleation of left eye    Avulsion of left eye, initial encounter 11/30/2018    Traumatic enucleation of left eye    Carcinoma in situ of prostate 10/31/2023    Epidermal cyst 10/31/2023    Esophageal dysphagia 10/31/2023    Heart failure (Multi) 10/31/2023    Heart failure, unspecified (Multi) 10/31/2023    Hypotension 03/03/2023    Ingrowing toenail 10/31/2023    Nephrolithiasis 05/15/2012    Pain, unspecified 10/31/2023    Recurrent major depression (CMS-HCC) 10/31/2023    Uric acid urolithiasis 10/31/2023    Vitreous degeneration, left eye 11/30/2018    PVD (posterior vitreous detachment), left eye    Vitreous degeneration, left eye 11/30/2018    PVD (posterior vitreous detachment), left eye     History reviewed. No pertinent surgical history.  No family history on file.  @SOC    Current Outpatient Medications   Medication Sig Dispense Refill    albuterol 90 mcg/actuation inhaler Inhale 2 puffs every 6 hours if needed for shortness of breath.      ammonium lactate (Amlactin) 12 % cream Apply 1 Application topically if needed for dry skin (toes).      bacitracin 500 unit/gram ointment Apply 1 Application topically 2 times a day.      benzonatate (Tessalon) 100 mg capsule Take 1 capsule (100 mg) by mouth 3 times a day as needed for cough. Do not crush or chew. 20 capsule 0    carboxymethylcellulose (Refresh Plus) 0.5 % ophthalmic solution " Administer 1 drop into the left eye 3 times a day as needed for dry eyes.      cetirizine (ZyrTEC) 5 mg tablet Take 1 tablet (5 mg) by mouth once daily.      cholecalciferol (Vitamin D-3) 25 MCG (1000 UT) tablet Take 1 tablet (1,000 Units) by mouth once daily.      clindamycin (Cleocin T) 1 % external solution APPLY A SUFFICIENT AMOUNT EXTERNALLY TWICE A DAY TO SCROTUM AS NEEDED FOR LESION      digoxin (Lanoxin) 125 MCG tablet Take 1 tablet (125 mcg) by mouth once daily.      docosahexaenoic acid/epa (FISH OIL ORAL) Take 1 capsule by mouth 2 times a day.      docusate sodium (Colace) 100 mg capsule Take 2 capsules (200 mg) by mouth once daily in the morning.      DULoxetine (Cymbalta) 60 mg DR capsule Take 1 capsule (60 mg) by mouth once daily.      furosemide (Lasix) 40 mg tablet Take 3 tablets (120 mg) by mouth 2 times a day.      gabapentin (Neurontin) 400 mg capsule Take 2 capsules (800 mg) by mouth 3 times a day.      glipiZIDE (Glucotrol) 10 mg tablet Take 1 tablet (10 mg) by mouth 2 times a day before meals.      ketoconazole (NIZOral) 2 % shampoo SHAMPOO A SUFFICIENT AMOUNT EXTERNALLY EVERY OTHER DAY .  LATHER ONTO THE SCALP, FACE, BEHIND EARS FOR 5 MINUTES EVERY OTHER DAY  THEN RINSE .  LATHER ONTO THE SCALP, FACE, BEHIND EARS FOR 5 MINUTES EVERY OTHER DAY   THEN RINSE      KRILL OIL ORAL Take by mouth every 12 hours.      lisinopril 2.5 mg tablet Take 1 tablet (2.5 mg) by mouth once daily.      lubricating eye drops ophthalmic solution Administer 1 drop into the left eye 3 times a day as needed for dry eyes.      magnesium oxide (Mag-Ox) 400 mg (241.3 mg magnesium) tablet Take 1 tablet (400 mg) by mouth twice a day.      metFORMIN (Glucophage) 1,000 mg tablet Take 1 tablet (1,000 mg) by mouth 2 times daily (morning and late afternoon).      metoprolol tartrate (Lopressor) 25 mg tablet Take 1 tablet (25 mg) by mouth 2 times a day. 180 tablet 0    multivitamin tablet Take 1 tablet by mouth once daily.       multivitamin with minerals tablet Take 1 tablet by mouth once daily.      omega 3-dha-epa-fish oil (Fish OiL) 1,000 mg (120 mg-180 mg) capsule Take 1 capsule (1,000 mg) by mouth 2 times a day.      potassium chloride CR (Klor-Con M20) 20 mEq ER tablet Take 2 tablets (40 mEq) by mouth 2 times a day.      potassium citrate CR (Urocit-K-5) 5 mEq ER tablet Take 3 tablets (15 mEq) by mouth once daily at bedtime. Do not crush, chew, or split.      psyllium (Metamucil) 3.4 gram packet Take by mouth.      psyllium husk (METAMUCIL ORAL) Take 1 Scoop by mouth once daily.      rosuvastatin (Crestor) 20 mg tablet Take 1 tablet (20 mg) by mouth once daily at bedtime.      spironolactone (Aldactone) 25 mg tablet Take 1 tablet (25 mg) by mouth once daily. 30 tablet 11    tamsulosin (Flomax) 0.4 mg 24 hr capsule Take 2 capsules (0.8 mg) by mouth once daily at bedtime.      topiramate (Topamax) 50 mg tablet Take 1 tablet (50 mg) by mouth 2 times a day. 60 tablet 11    warfarin (Coumadin) 10 mg tablet Take 1 tablet (10 mg) by mouth. Thursday      warfarin (Coumadin) 5 mg tablet Take 2.5 tablets (12.5 mg) by mouth. 6 times weekly       No current facility-administered medications for this visit.        ROS  Review of Systems   All other systems reviewed and are negative.      Physical Exam  Physical Exam  Constitutional:       Appearance: He is obese.   HENT:      Head: Normocephalic and atraumatic.   Cardiovascular:      Rate and Rhythm: Rhythm irregularly irregular.   Skin:     General: Skin is warm and dry.   Neurological:      General: No focal deficit present.      Mental Status: He is alert and oriented to person, place, and time.   Psychiatric:         Mood and Affect: Mood normal.         Behavior: Behavior normal.          EKG  Encounter Date: 07/23/24   ECG 12 Lead    Narrative    Atrail fibrillation at 112/min., LAD, possib le old ASWMI       Problem List Items Addressed This Visit       (HFpEF) heart failure with preserved  ejection fraction (Multi)    AF (atrial fibrillation) (Multi) - Primary    Relevant Orders    ECG 12 Lead (Completed)    Follow Up In Cardiology    Digoxin level    Traumatic enucleation of right eye    Obstructive sleep apnea of adult    Morbid obesity (Multi)    Diabetes mellitus, type 2 (Multi)    Dilation of thoracic aorta (CMS-HCC)         Continue off metoprolol with Dig level with [facility]      Dutch Milan MD

## 2024-08-06 ENCOUNTER — APPOINTMENT (OUTPATIENT)
Dept: OPHTHALMOLOGY | Facility: CLINIC | Age: 64
End: 2024-08-06
Payer: MEDICARE

## 2024-08-08 ENCOUNTER — APPOINTMENT (OUTPATIENT)
Dept: CARDIOLOGY | Facility: CLINIC | Age: 64
End: 2024-08-08
Payer: MEDICARE

## 2024-08-21 ENCOUNTER — APPOINTMENT (OUTPATIENT)
Dept: CARDIOLOGY | Facility: CLINIC | Age: 64
End: 2024-08-21
Payer: COMMERCIAL

## 2024-08-27 ENCOUNTER — APPOINTMENT (OUTPATIENT)
Dept: NEUROLOGY | Facility: HOSPITAL | Age: 64
End: 2024-08-27
Payer: MEDICARE

## 2024-09-03 ENCOUNTER — APPOINTMENT (OUTPATIENT)
Dept: CARDIOLOGY | Facility: CLINIC | Age: 64
End: 2024-09-03
Payer: MEDICARE

## 2024-09-03 ENCOUNTER — APPOINTMENT (OUTPATIENT)
Dept: OPHTHALMOLOGY | Facility: CLINIC | Age: 64
End: 2024-09-03
Payer: COMMERCIAL

## 2024-09-03 DIAGNOSIS — H33.22 LEFT RETINAL DETACHMENT: Primary | ICD-10-CM

## 2024-09-03 DIAGNOSIS — I48.19 PERSISTENT ATRIAL FIBRILLATION (MULTI): ICD-10-CM

## 2024-09-03 PROCEDURE — 99213 OFFICE O/P EST LOW 20 MIN: CPT

## 2024-09-03 PROCEDURE — 92134 CPTRZ OPH DX IMG PST SGM RTA: CPT | Mod: LEFT SIDE

## 2024-09-03 ASSESSMENT — CUP TO DISC RATIO: OS_RATIO: .4

## 2024-09-03 ASSESSMENT — REFRACTION_WEARINGRX
OS_CYLINDER: -0.75
OS_AXIS: 067
OS_SPHERE: +0.75

## 2024-09-03 ASSESSMENT — CONF VISUAL FIELD: COMMENTS: PROSTHESIS OD

## 2024-09-03 ASSESSMENT — VISUAL ACUITY
METHOD: SNELLEN - LINEAR
OD_SC: PROSTHESIS
OS_SC: 20/

## 2024-09-03 ASSESSMENT — EXTERNAL EXAM - RIGHT EYE: OD_EXAM: PROSTHESIS

## 2024-09-03 ASSESSMENT — TONOMETRY
OS_IOP_MMHG: 20
IOP_METHOD: GOLDMANN APPLANATION

## 2024-09-03 ASSESSMENT — SLIT LAMP EXAM - LIDS
COMMENTS: GOOD POSITION
COMMENTS: GOOD POSITION

## 2024-09-03 ASSESSMENT — EXTERNAL EXAM - LEFT EYE: OS_EXAM: NORMAL

## 2024-09-03 ASSESSMENT — ENCOUNTER SYMPTOMS: EYES NEGATIVE: 1

## 2024-09-03 NOTE — PROGRESS NOTES
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    - s/p laser retinopexy OS on 11/30/2018.    - SRF within the barrier laser has resolved;     - CR scar inferiorly at 6 oclock     Plan    - Stable, observe    - Would like MRx; consult with optometry     - Monocular precautions    - Discussed f/u annually but patient would like 6 month     mild NPDR OS   - No DME   - A1c is 7.6 (5/24)    OCT 09/03/24     - Right eye (OD): -    - Left eye (OS): Normal foveal contour, RPE  outer and inner retinal layers. No IRF or SRF     Prosthesis, right eye    - Traumatic enucleation of right eye    - continue to monitor       Lens status:   - OS: PCIOL

## 2024-09-18 ENCOUNTER — OFFICE VISIT (OUTPATIENT)
Dept: CARDIOLOGY | Facility: CLINIC | Age: 64
End: 2024-09-18
Payer: COMMERCIAL

## 2024-09-18 VITALS
HEART RATE: 104 BPM | OXYGEN SATURATION: 94 % | HEIGHT: 74 IN | DIASTOLIC BLOOD PRESSURE: 80 MMHG | SYSTOLIC BLOOD PRESSURE: 152 MMHG | WEIGHT: 315 LBS | BODY MASS INDEX: 40.43 KG/M2

## 2024-09-18 DIAGNOSIS — S05.71XD: ICD-10-CM

## 2024-09-18 DIAGNOSIS — I50.32 CHRONIC HEART FAILURE WITH PRESERVED EJECTION FRACTION: ICD-10-CM

## 2024-09-18 DIAGNOSIS — E11.9 TYPE 2 DIABETES MELLITUS WITHOUT COMPLICATION, WITHOUT LONG-TERM CURRENT USE OF INSULIN (MULTI): ICD-10-CM

## 2024-09-18 DIAGNOSIS — I77.810 DILATION OF THORACIC AORTA (CMS-HCC): ICD-10-CM

## 2024-09-18 DIAGNOSIS — I48.19 PERSISTENT ATRIAL FIBRILLATION (MULTI): Primary | ICD-10-CM

## 2024-09-18 DIAGNOSIS — I10 HYPERTENSION, UNSPECIFIED TYPE: ICD-10-CM

## 2024-09-18 PROCEDURE — 4010F ACE/ARB THERAPY RXD/TAKEN: CPT | Performed by: INTERNAL MEDICINE

## 2024-09-18 PROCEDURE — 93010 ELECTROCARDIOGRAM REPORT: CPT | Performed by: INTERNAL MEDICINE

## 2024-09-18 PROCEDURE — 3051F HG A1C>EQUAL 7.0%<8.0%: CPT | Performed by: INTERNAL MEDICINE

## 2024-09-18 PROCEDURE — 3078F DIAST BP <80 MM HG: CPT | Performed by: INTERNAL MEDICINE

## 2024-09-18 PROCEDURE — 99215 OFFICE O/P EST HI 40 MIN: CPT | Performed by: INTERNAL MEDICINE

## 2024-09-18 PROCEDURE — 1036F TOBACCO NON-USER: CPT | Performed by: INTERNAL MEDICINE

## 2024-09-18 PROCEDURE — 93005 ELECTROCARDIOGRAM TRACING: CPT | Performed by: INTERNAL MEDICINE

## 2024-09-18 PROCEDURE — 3060F POS MICROALBUMINURIA REV: CPT | Performed by: INTERNAL MEDICINE

## 2024-09-18 PROCEDURE — 3077F SYST BP >= 140 MM HG: CPT | Performed by: INTERNAL MEDICINE

## 2024-09-18 PROCEDURE — 3008F BODY MASS INDEX DOCD: CPT | Performed by: INTERNAL MEDICINE

## 2024-09-18 NOTE — PROGRESS NOTES
"  Subjective  Nasir Sandoval  is a 64 y.o. year old male who presents for paroxysmal atrial fibrillation HFpEF.  Very dyspneic but BP is better.  No edema, no chest pain.  Low stamina but still strong otherwise.  HR in the 60'2 at rest    Blood pressure 152/80, pulse 104, height 1.88 m (6' 2\"), weight (!) 159 kg (350 lb 12.8 oz), SpO2 94%.   Penicillins  Past Medical History:   Diagnosis Date    VALERIANO (acute kidney injury) (CMS-HCC) 05/15/2012    Avulsion of left eye, initial encounter 11/30/2018    Traumatic enucleation of left eye    Avulsion of left eye, initial encounter 11/30/2018    Traumatic enucleation of left eye    Carcinoma in situ of prostate 10/31/2023    Epidermal cyst 10/31/2023    Esophageal dysphagia 10/31/2023    Heart failure (Multi) 10/31/2023    Heart failure, unspecified (Multi) 10/31/2023    Hypotension 03/03/2023    Ingrowing toenail 10/31/2023    Nephrolithiasis 05/15/2012    Pain, unspecified 10/31/2023    Recurrent major depression (CMS-HCC) 10/31/2023    Uric acid urolithiasis 10/31/2023    Vitreous degeneration, left eye 11/30/2018    PVD (posterior vitreous detachment), left eye    Vitreous degeneration, left eye 11/30/2018    PVD (posterior vitreous detachment), left eye     History reviewed. No pertinent surgical history.  No family history on file.  @SOC    Current Outpatient Medications   Medication Sig Dispense Refill    albuterol 90 mcg/actuation inhaler Inhale 2 puffs every 6 hours if needed for shortness of breath.      ammonium lactate (Amlactin) 12 % cream Apply 1 Application topically if needed for dry skin (toes).      bacitracin 500 unit/gram ointment Apply 1 Application topically 2 times a day.      benzonatate (Tessalon) 100 mg capsule Take 1 capsule (100 mg) by mouth 3 times a day as needed for cough. Do not crush or chew. 20 capsule 0    carboxymethylcellulose (Refresh Plus) 0.5 % ophthalmic solution Administer 1 drop into the left eye 3 times a day as needed for dry eyes. "      cetirizine (ZyrTEC) 5 mg tablet Take 1 tablet (5 mg) by mouth once daily.      cholecalciferol (Vitamin D-3) 25 MCG (1000 UT) tablet Take 1 tablet (1,000 Units) by mouth once daily.      clindamycin (Cleocin T) 1 % external solution APPLY A SUFFICIENT AMOUNT EXTERNALLY TWICE A DAY TO SCROTUM AS NEEDED FOR LESION      digoxin (Lanoxin) 125 MCG tablet Take 1 tablet (125 mcg) by mouth once daily.      docosahexaenoic acid/epa (FISH OIL ORAL) Take 1 capsule by mouth 2 times a day.      docusate sodium (Colace) 100 mg capsule Take 2 capsules (200 mg) by mouth once daily in the morning.      DULoxetine (Cymbalta) 60 mg DR capsule Take 1 capsule (60 mg) by mouth once daily.      furosemide (Lasix) 40 mg tablet Take 3 tablets (120 mg) by mouth 2 times a day.      gabapentin (Neurontin) 400 mg capsule Take 2 capsules (800 mg) by mouth 3 times a day.      glipiZIDE (Glucotrol) 10 mg tablet Take 1 tablet (10 mg) by mouth 2 times a day before meals.      ketoconazole (NIZOral) 2 % shampoo SHAMPOO A SUFFICIENT AMOUNT EXTERNALLY EVERY OTHER DAY .  LATHER ONTO THE SCALP, FACE, BEHIND EARS FOR 5 MINUTES EVERY OTHER DAY  THEN RINSE .  LATHER ONTO THE SCALP, FACE, BEHIND EARS FOR 5 MINUTES EVERY OTHER DAY   THEN RINSE      KRILL OIL ORAL Take by mouth every 12 hours.      lisinopril 2.5 mg tablet Take 1 tablet (2.5 mg) by mouth once daily.      lubricating eye drops ophthalmic solution Administer 1 drop into the left eye 3 times a day as needed for dry eyes.      magnesium oxide (Mag-Ox) 400 mg (241.3 mg magnesium) tablet Take 1 tablet (400 mg) by mouth twice a day.      metFORMIN (Glucophage) 1,000 mg tablet Take 1 tablet (1,000 mg) by mouth 2 times daily (morning and late afternoon).      metoprolol tartrate (Lopressor) 25 mg tablet Take 1 tablet (25 mg) by mouth 2 times a day. 180 tablet 0    multivitamin tablet Take 1 tablet by mouth once daily.      multivitamin with minerals tablet Take 1 tablet by mouth once daily.       omega 3-dha-epa-fish oil (Fish OiL) 1,000 mg (120 mg-180 mg) capsule Take 1 capsule (1,000 mg) by mouth 2 times a day.      potassium chloride CR (Klor-Con M20) 20 mEq ER tablet Take 2 tablets (40 mEq) by mouth 2 times a day.      potassium citrate CR (Urocit-K-5) 5 mEq ER tablet Take 3 tablets (15 mEq) by mouth once daily at bedtime. Do not crush, chew, or split.      psyllium (Metamucil) 3.4 gram packet Take by mouth.      psyllium husk (METAMUCIL ORAL) Take 1 Scoop by mouth once daily.      rosuvastatin (Crestor) 20 mg tablet Take 1 tablet (20 mg) by mouth once daily at bedtime.      spironolactone (Aldactone) 25 mg tablet Take 1 tablet (25 mg) by mouth once daily. 30 tablet 11    tamsulosin (Flomax) 0.4 mg 24 hr capsule Take 2 capsules (0.8 mg) by mouth once daily at bedtime.      topiramate (Topamax) 50 mg tablet Take 1 tablet (50 mg) by mouth 2 times a day. 60 tablet 11    warfarin (Coumadin) 10 mg tablet Take 1 tablet (10 mg) by mouth. Thursday      warfarin (Coumadin) 5 mg tablet Take 2.5 tablets (12.5 mg) by mouth. 6 times weekly       No current facility-administered medications for this visit.        ROS  Review of Systems   All other systems reviewed and are negative.      Physical Exam  Physical Exam  Constitutional:       Appearance: He is obese.   HENT:      Head: Normocephalic and atraumatic.   Cardiovascular:      Rate and Rhythm: Tachycardia present. Rhythm irregularly irregular.   Abdominal:      Palpations: Abdomen is soft.   Skin:     General: Skin is warm and dry.   Neurological:      General: No focal deficit present.      Mental Status: He is alert and oriented to person, place, and time.   Psychiatric:         Mood and Affect: Mood normal.         Behavior: Behavior normal.          EKG  Encounter Date: 09/18/24   ECG 12 Lead    Narrative    Atrial fibrillation at 106/min., LAD, ols IWMI, old ASWMI       Problem List Items Addressed This Visit       (HFpEF) heart failure with preserved  ejection fraction (Multi) - Primary    Relevant Orders    CBC and Auto Differential    Basic metabolic panel    B-Type Natriuretic Peptide    AF (atrial fibrillation) (Multi)    Relevant Orders    ECG 12 Lead (Completed)    Hypertension    Traumatic enucleation of right eye    Diabetes mellitus, type 2 (Multi)    Dilation of thoracic aorta (CMS-HCC)         SBS, BMP,BNP with next blood work  Return 2 months      Dutch Milan MD

## 2024-10-01 ENCOUNTER — TELEPHONE (OUTPATIENT)
Dept: GASTROENTEROLOGY | Facility: CLINIC | Age: 64
End: 2024-10-01

## 2024-10-01 ENCOUNTER — APPOINTMENT (OUTPATIENT)
Dept: GASTROENTEROLOGY | Facility: CLINIC | Age: 64
End: 2024-10-01
Payer: COMMERCIAL

## 2024-10-01 VITALS
HEART RATE: 100 BPM | DIASTOLIC BLOOD PRESSURE: 71 MMHG | HEIGHT: 74 IN | WEIGHT: 315 LBS | BODY MASS INDEX: 40.43 KG/M2 | SYSTOLIC BLOOD PRESSURE: 143 MMHG

## 2024-10-01 DIAGNOSIS — K22.0 ACHALASIA, ESOPHAGEAL: ICD-10-CM

## 2024-10-01 DIAGNOSIS — I50.32 CHRONIC HEART FAILURE WITH PRESERVED EJECTION FRACTION: ICD-10-CM

## 2024-10-01 DIAGNOSIS — Z12.11 COLON CANCER SCREENING: Primary | ICD-10-CM

## 2024-10-01 DIAGNOSIS — E66.01 MORBID OBESITY (MULTI): ICD-10-CM

## 2024-10-01 PROCEDURE — 3051F HG A1C>EQUAL 7.0%<8.0%: CPT | Performed by: STUDENT IN AN ORGANIZED HEALTH CARE EDUCATION/TRAINING PROGRAM

## 2024-10-01 PROCEDURE — 3077F SYST BP >= 140 MM HG: CPT | Performed by: STUDENT IN AN ORGANIZED HEALTH CARE EDUCATION/TRAINING PROGRAM

## 2024-10-01 PROCEDURE — 3060F POS MICROALBUMINURIA REV: CPT | Performed by: STUDENT IN AN ORGANIZED HEALTH CARE EDUCATION/TRAINING PROGRAM

## 2024-10-01 PROCEDURE — 3078F DIAST BP <80 MM HG: CPT | Performed by: STUDENT IN AN ORGANIZED HEALTH CARE EDUCATION/TRAINING PROGRAM

## 2024-10-01 PROCEDURE — 99214 OFFICE O/P EST MOD 30 MIN: CPT | Performed by: STUDENT IN AN ORGANIZED HEALTH CARE EDUCATION/TRAINING PROGRAM

## 2024-10-01 PROCEDURE — 4010F ACE/ARB THERAPY RXD/TAKEN: CPT | Performed by: STUDENT IN AN ORGANIZED HEALTH CARE EDUCATION/TRAINING PROGRAM

## 2024-10-01 PROCEDURE — 3008F BODY MASS INDEX DOCD: CPT | Performed by: STUDENT IN AN ORGANIZED HEALTH CARE EDUCATION/TRAINING PROGRAM

## 2024-10-01 RX ORDER — SODIUM CHLORIDE, SODIUM LACTATE, POTASSIUM CHLORIDE, CALCIUM CHLORIDE 600; 310; 30; 20 MG/100ML; MG/100ML; MG/100ML; MG/100ML
20 INJECTION, SOLUTION INTRAVENOUS CONTINUOUS
OUTPATIENT
Start: 2024-10-01

## 2024-10-01 RX ORDER — ONDANSETRON HYDROCHLORIDE 2 MG/ML
4 INJECTION, SOLUTION INTRAVENOUS ONCE AS NEEDED
OUTPATIENT
Start: 2024-10-01

## 2024-10-01 RX ORDER — SODIUM, POTASSIUM,MAG SULFATES 17.5-3.13G
SOLUTION, RECONSTITUTED, ORAL ORAL
Qty: 2 EACH | Refills: 0 | Status: SHIPPED | OUTPATIENT
Start: 2024-10-01

## 2024-10-01 NOTE — H&P (VIEW-ONLY)
Subjective     History of Present Illness:   Nasir Sandoval is a 64 y.o. male who presents to GI clinic for follow up and was last seen by myself on 5/30/23.  He has a  past medical history significant for heart failure with preserved EF, A. fib, diabetes, prior documented diagnosis of type II achalasia status post Botox injection most recently on 12/2022. On 5/30 he was evaluated for constipation.    Currently:    Achalasia: has been having episodes of vomiting.  States that these episodes are different from the way his achalasia typically starts, in the past when his Botox starts wearing off he begins having episodes of hiccups.  Currently he is having episodes of vomiting and associated nausea.  Nausea is unusual for him.  He believes he is having an alternative etiology for his symptoms, which began after he had an episode of pneumonia earlier this year for which she was hospitalized.  Following the treatments for his pneumonia, has had persistent nausea and vomiting.  Has not had any other workup or treatments for his nausea or vomiting.    Colon cancer screening: last colon 2015.  Has not had any changes in bowel habits.  [facility]      Past Medical History   has a past medical history of VALERIANO (acute kidney injury) (CMS-[facility]) (05/15/2012), Avulsion of left eye, initial encounter (11/30/2018), Avulsion of left eye, initial encounter (11/30/2018), Carcinoma in situ of prostate (10/31/2023), Epidermal cyst (10/31/2023), Esophageal dysphagia (10/31/2023), Heart failure (Multi) (10/31/2023), Heart failure, unspecified (Multi) (10/31/2023), Hypotension (03/03/2023), Ingrowing toenail (10/31/2023), Nephrolithiasis (05/15/2012), Pain, unspecified (10/31/2023), Recurrent major depression (CMS-[facility]) (10/31/2023), Uric acid urolithiasis (10/31/2023), Vitreous degeneration, left eye (11/30/2018), and Vitreous degeneration, left eye (11/30/2018).     Social History   reports that he quit smoking about 44 years  ago. His smoking use included cigarettes. He has never used smokeless tobacco. He reports current alcohol use. He reports that he does not use drugs.     Family History  family history is not on file.     Allergies  Allergies   Allergen Reactions    Penicillins Siva Garnett, RN Registered Nurse Signed  ED Notes Service date: 09/01/2014 2:49 PM    Unasyn test dose given Sq per MD request w/o reaction of any kind after 20 minutes.  Medication administered as ordered.       Medications  Current Outpatient Medications   Medication Instructions    albuterol 90 mcg/actuation inhaler 2 puffs, inhalation, Every 6 hours PRN    ammonium lactate (Amlactin) 12 % cream Apply 1 Application topically if needed for dry skin (toes).    bacitracin 500 unit/gram ointment 1 Application, Topical, 2 times daily    benzonatate (TESSALON) 100 mg, oral, 3 times daily PRN, Do not crush or chew.    carboxymethylcellulose (Refresh Plus) 0.5 % ophthalmic solution Administer 1 drop into the left eye 3 times a day as needed for dry eyes.    cetirizine (ZyrTEC) 5 mg tablet 1 tablet, oral, Daily    cholecalciferol (VITAMIN D-3) 1,000 Units, oral, Daily    clindamycin (Cleocin T) 1 % external solution APPLY A SUFFICIENT AMOUNT EXTERNALLY TWICE A DAY TO SCROTUM AS NEEDED FOR LESION    digoxin (Lanoxin) 125 MCG tablet 1 tablet, oral, Daily    docosahexaenoic acid/epa (FISH OIL ORAL) Take 1 capsule by mouth 2 times a day.    docusate sodium (Colace) 100 mg capsule 2 capsules, oral, Every morning    DULoxetine (Cymbalta) 60 mg DR capsule 1 capsule, oral, Daily    furosemide (LASIX) 120 mg, oral, 2 times daily    gabapentin (NEURONTIN) 800 mg, oral, 3 times daily    glipiZIDE (GLUCOTROL) 10 mg, oral, 2 times daily before meals    ketoconazole (NIZOral) 2 % shampoo SHAMPOO A SUFFICIENT AMOUNT EXTERNALLY EVERY OTHER DAY .  LATHER ONTO THE SCALP, FACE, BEHIND EARS FOR 5 MINUTES EVERY OTHER DAY  THEN RINSE .  LATHER ONTO THE SCALP, FACE,  BEHIND EARS FOR 5 MINUTES EVERY OTHER DAY   THEN RINSE    KRILL OIL ORAL oral, Every 12 hours    lisinopril 2.5 mg, oral, Daily    lubricating eye drops ophthalmic solution 1 drop, Left Eye, 3 times daily PRN    magnesium oxide (MAG-OX) 400 mg, oral, 2 times daily    metFORMIN (GLUCOPHAGE) 1,000 mg, oral, 2 times daily (morning and late afternoon)    metoprolol tartrate (LOPRESSOR) 25 mg, oral, 2 times daily    multivitamin tablet 1 tablet, oral, Daily    multivitamin with minerals tablet 1 tablet, oral, Daily    omega 3-dha-epa-fish oil (Fish OiL) 1,000 mg (120 mg-180 mg) capsule 1 capsule, oral, 2 times daily    potassium chloride CR (Klor-Con M20) 20 mEq ER tablet 2 tablets, oral, 2 times daily    potassium citrate CR (Urocit-K-5) 5 mEq ER tablet 15 mEq, oral, Nightly, Do not crush, chew, or split.    psyllium (Metamucil) 3.4 gram packet oral    psyllium husk (METAMUCIL ORAL) 1 Scoop, oral, Daily    rosuvastatin (Crestor) 20 mg tablet 1 tablet, oral, Nightly    spironolactone (ALDACTONE) 25 mg, oral, Daily    tamsulosin (FLOMAX) 0.8 mg, oral, Nightly    topiramate (TOPAMAX) 50 mg, oral, 2 times daily    warfarin (Coumadin) 5 mg tablet Take 2.5 tablets (12.5 mg) by mouth. 6 times weekly    warfarin (COUMADIN) 10 mg, oral, Thursday        Objective   There were no vitals taken for this visit.   Physical Exam  Vitals reviewed.   Constitutional:       General: He is awake.   Pulmonary:      Effort: Pulmonary effort is normal.      Breath sounds: Normal breath sounds.   Neurological:      Mental Status: He is alert and oriented to person, place, and time.   Psychiatric:         Attention and Perception: Attention and perception normal.         Behavior: Behavior normal.       Assessment/Plan   Nasir Sandoval is a 64 y.o. male who presents to GI clinic for evaluation of nausea and vomiting.  I do not suspect is related to his achalasia, however further workup of his symptoms will require repeat treatment for his  achalasia as well.  This could be an unusual presentation for his achalasia considering prior symptomology when his Botox injections were off.  He has been 2 years since his Botox injection, as such she may benefit from a repeat Botox injection and EGD for further evaluation of his new onset symptoms.  I anticipate that he is having side effects from recent antibiotic use and disruption of his GI gut biome.  Will perform an EGD and colon cancer screening, based on those results we will revisit these findings.  At the time of EGD we will also inject Botox.    Due to his multiple medical comorbidities including heart failure, morbid obesity, diabetes, he is at an elevated risk of complications.  His procedure will need to be done in the hospital with anesthesia support.  Discussed patient's elevated risk of complications with him.        Ever Thorne MD

## 2024-10-01 NOTE — PROGRESS NOTES
Subjective     History of Present Illness:   Nasir Sandoval is a 64 y.o. male who presents to GI clinic for follow up and was last seen by myself on 5/30/23.  He has a  past medical history significant for heart failure with preserved EF, A. fib, diabetes, prior documented diagnosis of type II achalasia status post Botox injection most recently on 12/2022. On 5/30 he was evaluated for constipation.    Currently:    Achalasia: has been having episodes of vomiting.  States that these episodes are different from the way his achalasia typically starts, in the past when his Botox starts wearing off he begins having episodes of hiccups.  Currently he is having episodes of vomiting and associated nausea.  Nausea is unusual for him.  He believes he is having an alternative etiology for his symptoms, which began after he had an episode of pneumonia earlier this year for which she was hospitalized.  Following the treatments for his pneumonia, has had persistent nausea and vomiting.  Has not had any other workup or treatments for his nausea or vomiting.    Colon cancer screening: last colon 2015.  Has not had any changes in bowel habits.  [facility]      Past Medical History   has a past medical history of VALERIANO (acute kidney injury) (CMS-[facility]) (05/15/2012), Avulsion of left eye, initial encounter (11/30/2018), Avulsion of left eye, initial encounter (11/30/2018), Carcinoma in situ of prostate (10/31/2023), Epidermal cyst (10/31/2023), Esophageal dysphagia (10/31/2023), Heart failure (Multi) (10/31/2023), Heart failure, unspecified (Multi) (10/31/2023), Hypotension (03/03/2023), Ingrowing toenail (10/31/2023), Nephrolithiasis (05/15/2012), Pain, unspecified (10/31/2023), Recurrent major depression (CMS-[facility]) (10/31/2023), Uric acid urolithiasis (10/31/2023), Vitreous degeneration, left eye (11/30/2018), and Vitreous degeneration, left eye (11/30/2018).     Social History   reports that he quit smoking about 44 years  ago. His smoking use included cigarettes. He has never used smokeless tobacco. He reports current alcohol use. He reports that he does not use drugs.     Family History  family history is not on file.     Allergies  Allergies   Allergen Reactions    Penicillins Siva Garnett, RN Registered Nurse Signed  ED Notes Service date: 09/01/2014 2:49 PM    Unasyn test dose given Sq per MD request w/o reaction of any kind after 20 minutes.  Medication administered as ordered.       Medications  Current Outpatient Medications   Medication Instructions    albuterol 90 mcg/actuation inhaler 2 puffs, inhalation, Every 6 hours PRN    ammonium lactate (Amlactin) 12 % cream Apply 1 Application topically if needed for dry skin (toes).    bacitracin 500 unit/gram ointment 1 Application, Topical, 2 times daily    benzonatate (TESSALON) 100 mg, oral, 3 times daily PRN, Do not crush or chew.    carboxymethylcellulose (Refresh Plus) 0.5 % ophthalmic solution Administer 1 drop into the left eye 3 times a day as needed for dry eyes.    cetirizine (ZyrTEC) 5 mg tablet 1 tablet, oral, Daily    cholecalciferol (VITAMIN D-3) 1,000 Units, oral, Daily    clindamycin (Cleocin T) 1 % external solution APPLY A SUFFICIENT AMOUNT EXTERNALLY TWICE A DAY TO SCROTUM AS NEEDED FOR LESION    digoxin (Lanoxin) 125 MCG tablet 1 tablet, oral, Daily    docosahexaenoic acid/epa (FISH OIL ORAL) Take 1 capsule by mouth 2 times a day.    docusate sodium (Colace) 100 mg capsule 2 capsules, oral, Every morning    DULoxetine (Cymbalta) 60 mg DR capsule 1 capsule, oral, Daily    furosemide (LASIX) 120 mg, oral, 2 times daily    gabapentin (NEURONTIN) 800 mg, oral, 3 times daily    glipiZIDE (GLUCOTROL) 10 mg, oral, 2 times daily before meals    ketoconazole (NIZOral) 2 % shampoo SHAMPOO A SUFFICIENT AMOUNT EXTERNALLY EVERY OTHER DAY .  LATHER ONTO THE SCALP, FACE, BEHIND EARS FOR 5 MINUTES EVERY OTHER DAY  THEN RINSE .  LATHER ONTO THE SCALP, FACE,  BEHIND EARS FOR 5 MINUTES EVERY OTHER DAY   THEN RINSE    KRILL OIL ORAL oral, Every 12 hours    lisinopril 2.5 mg, oral, Daily    lubricating eye drops ophthalmic solution 1 drop, Left Eye, 3 times daily PRN    magnesium oxide (MAG-OX) 400 mg, oral, 2 times daily    metFORMIN (GLUCOPHAGE) 1,000 mg, oral, 2 times daily (morning and late afternoon)    metoprolol tartrate (LOPRESSOR) 25 mg, oral, 2 times daily    multivitamin tablet 1 tablet, oral, Daily    multivitamin with minerals tablet 1 tablet, oral, Daily    omega 3-dha-epa-fish oil (Fish OiL) 1,000 mg (120 mg-180 mg) capsule 1 capsule, oral, 2 times daily    potassium chloride CR (Klor-Con M20) 20 mEq ER tablet 2 tablets, oral, 2 times daily    potassium citrate CR (Urocit-K-5) 5 mEq ER tablet 15 mEq, oral, Nightly, Do not crush, chew, or split.    psyllium (Metamucil) 3.4 gram packet oral    psyllium husk (METAMUCIL ORAL) 1 Scoop, oral, Daily    rosuvastatin (Crestor) 20 mg tablet 1 tablet, oral, Nightly    spironolactone (ALDACTONE) 25 mg, oral, Daily    tamsulosin (FLOMAX) 0.8 mg, oral, Nightly    topiramate (TOPAMAX) 50 mg, oral, 2 times daily    warfarin (Coumadin) 5 mg tablet Take 2.5 tablets (12.5 mg) by mouth. 6 times weekly    warfarin (COUMADIN) 10 mg, oral, Thursday        Objective   There were no vitals taken for this visit.   Physical Exam  Vitals reviewed.   Constitutional:       General: He is awake.   Pulmonary:      Effort: Pulmonary effort is normal.      Breath sounds: Normal breath sounds.   Neurological:      Mental Status: He is alert and oriented to person, place, and time.   Psychiatric:         Attention and Perception: Attention and perception normal.         Behavior: Behavior normal.       Assessment/Plan   Nasir Sandoval is a 64 y.o. male who presents to GI clinic for evaluation of nausea and vomiting.  I do not suspect is related to his achalasia, however further workup of his symptoms will require repeat treatment for his  achalasia as well.  This could be an unusual presentation for his achalasia considering prior symptomology when his Botox injections were off.  He has been 2 years since his Botox injection, as such she may benefit from a repeat Botox injection and EGD for further evaluation of his new onset symptoms.  I anticipate that he is having side effects from recent antibiotic use and disruption of his GI gut biome.  Will perform an EGD and colon cancer screening, based on those results we will revisit these findings.  At the time of EGD we will also inject Botox.    Due to his multiple medical comorbidities including heart failure, morbid obesity, diabetes, he is at an elevated risk of complications.  His procedure will need to be done in the hospital with anesthesia support.  Discussed patient's elevated risk of complications with him.        Ever Thorne MD

## 2024-10-14 ENCOUNTER — LAB (OUTPATIENT)
Dept: LAB | Facility: LAB | Age: 64
End: 2024-10-14
Payer: MEDICARE

## 2024-10-14 DIAGNOSIS — E78.5 HYPERLIPIDEMIA, UNSPECIFIED: ICD-10-CM

## 2024-10-14 DIAGNOSIS — E11.9 TYPE 2 DIABETES MELLITUS WITHOUT COMPLICATIONS (MULTI): Primary | ICD-10-CM

## 2024-10-14 LAB
ALBUMIN SERPL BCP-MCNC: 4.4 G/DL (ref 3.4–5)
ALP SERPL-CCNC: 70 U/L (ref 33–136)
ALT SERPL W P-5'-P-CCNC: 26 U/L (ref 10–52)
ANION GAP SERPL CALC-SCNC: 15 MMOL/L (ref 10–20)
AST SERPL W P-5'-P-CCNC: 19 U/L (ref 9–39)
BILIRUB SERPL-MCNC: 0.4 MG/DL (ref 0–1.2)
BUN SERPL-MCNC: 28 MG/DL (ref 6–23)
CALCIUM SERPL-MCNC: 10.2 MG/DL (ref 8.6–10.6)
CHLORIDE SERPL-SCNC: 101 MMOL/L (ref 98–107)
CHOLEST SERPL-MCNC: 111 MG/DL (ref 0–199)
CHOLESTEROL/HDL RATIO: 3.5
CO2 SERPL-SCNC: 30 MMOL/L (ref 21–32)
CREAT SERPL-MCNC: 1.27 MG/DL (ref 0.5–1.3)
CREAT UR-MCNC: 89.5 MG/DL (ref 20–370)
EGFRCR SERPLBLD CKD-EPI 2021: 63 ML/MIN/1.73M*2
EST. AVERAGE GLUCOSE BLD GHB EST-MCNC: 148 MG/DL
GLUCOSE SERPL-MCNC: 149 MG/DL (ref 74–99)
HBA1C MFR BLD: 6.8 %
HDLC SERPL-MCNC: 31.7 MG/DL
LDLC SERPL CALC-MCNC: 27 MG/DL
MICROALBUMIN UR-MCNC: 11.1 MG/L
MICROALBUMIN/CREAT UR: 12.4 UG/MG CREAT
NON HDL CHOLESTEROL: 79 MG/DL (ref 0–149)
POTASSIUM SERPL-SCNC: 4.6 MMOL/L (ref 3.5–5.3)
PROT SERPL-MCNC: 8.1 G/DL (ref 6.4–8.2)
SODIUM SERPL-SCNC: 141 MMOL/L (ref 136–145)
TRIGL SERPL-MCNC: 263 MG/DL (ref 0–149)
VIT B12 SERPL-MCNC: 422 PG/ML (ref 211–911)
VLDL: 53 MG/DL (ref 0–40)

## 2024-10-14 PROCEDURE — 36415 COLL VENOUS BLD VENIPUNCTURE: CPT

## 2024-10-14 PROCEDURE — 80053 COMPREHEN METABOLIC PANEL: CPT

## 2024-10-14 PROCEDURE — 82570 ASSAY OF URINE CREATININE: CPT

## 2024-10-14 PROCEDURE — 83036 HEMOGLOBIN GLYCOSYLATED A1C: CPT

## 2024-10-14 PROCEDURE — 80061 LIPID PANEL: CPT

## 2024-10-14 PROCEDURE — 82607 VITAMIN B-12: CPT

## 2024-10-14 PROCEDURE — 82043 UR ALBUMIN QUANTITATIVE: CPT

## 2024-10-17 ENCOUNTER — ANESTHESIA (OUTPATIENT)
Dept: GASTROENTEROLOGY | Facility: HOSPITAL | Age: 64
End: 2024-10-17
Payer: MEDICARE

## 2024-10-17 ENCOUNTER — HOSPITAL ENCOUNTER (OUTPATIENT)
Dept: GASTROENTEROLOGY | Facility: HOSPITAL | Age: 64
Discharge: HOME | End: 2024-10-17
Payer: MEDICARE

## 2024-10-17 ENCOUNTER — ANESTHESIA EVENT (OUTPATIENT)
Dept: GASTROENTEROLOGY | Facility: HOSPITAL | Age: 64
End: 2024-10-17
Payer: MEDICARE

## 2024-10-17 VITALS
RESPIRATION RATE: 16 BRPM | DIASTOLIC BLOOD PRESSURE: 65 MMHG | HEART RATE: 80 BPM | OXYGEN SATURATION: 96 % | SYSTOLIC BLOOD PRESSURE: 128 MMHG | TEMPERATURE: 97.2 F

## 2024-10-17 DIAGNOSIS — Z12.11 COLON CANCER SCREENING: ICD-10-CM

## 2024-10-17 DIAGNOSIS — K22.0 ACHALASIA, ESOPHAGEAL: ICD-10-CM

## 2024-10-17 LAB
GLUCOSE BLD MANUAL STRIP-MCNC: 157 MG/DL (ref 74–99)
GLUCOSE BLD MANUAL STRIP-MCNC: 174 MG/DL (ref 74–99)
POCT INTERNATIONAL NORMALIZATION RATIO: 1
POCT PROTHROMBIN TIME: 11.4 SECONDS

## 2024-10-17 PROCEDURE — 82947 ASSAY GLUCOSE BLOOD QUANT: CPT

## 2024-10-17 PROCEDURE — 45378 DIAGNOSTIC COLONOSCOPY: CPT | Performed by: STUDENT IN AN ORGANIZED HEALTH CARE EDUCATION/TRAINING PROGRAM

## 2024-10-17 PROCEDURE — 43239 EGD BIOPSY SINGLE/MULTIPLE: CPT | Performed by: STUDENT IN AN ORGANIZED HEALTH CARE EDUCATION/TRAINING PROGRAM

## 2024-10-17 PROCEDURE — 2500000004 HC RX 250 GENERAL PHARMACY W/ HCPCS (ALT 636 FOR OP/ED): Performed by: NURSE ANESTHETIST, CERTIFIED REGISTERED

## 2024-10-17 PROCEDURE — 2500000004 HC RX 250 GENERAL PHARMACY W/ HCPCS (ALT 636 FOR OP/ED): Mod: JZ | Performed by: STUDENT IN AN ORGANIZED HEALTH CARE EDUCATION/TRAINING PROGRAM

## 2024-10-17 PROCEDURE — 7100000010 HC PHASE TWO TIME - EACH INCREMENTAL 1 MINUTE

## 2024-10-17 PROCEDURE — 3700000002 HC GENERAL ANESTHESIA TIME - EACH INCREMENTAL 1 MINUTE

## 2024-10-17 PROCEDURE — 96372 THER/PROPH/DIAG INJ SC/IM: CPT | Performed by: STUDENT IN AN ORGANIZED HEALTH CARE EDUCATION/TRAINING PROGRAM

## 2024-10-17 PROCEDURE — 3700000001 HC GENERAL ANESTHESIA TIME - INITIAL BASE CHARGE

## 2024-10-17 PROCEDURE — 7100000009 HC PHASE TWO TIME - INITIAL BASE CHARGE

## 2024-10-17 RX ORDER — PROPOFOL 10 MG/ML
INJECTION, EMULSION INTRAVENOUS AS NEEDED
Status: DISCONTINUED | OUTPATIENT
Start: 2024-10-17 | End: 2024-10-17

## 2024-10-17 RX ORDER — PHENYLEPHRINE HCL IN 0.9% NACL 1 MG/10 ML
SYRINGE (ML) INTRAVENOUS AS NEEDED
Status: DISCONTINUED | OUTPATIENT
Start: 2024-10-17 | End: 2024-10-17

## 2024-10-17 RX ORDER — ONDANSETRON HYDROCHLORIDE 2 MG/ML
4 INJECTION, SOLUTION INTRAVENOUS ONCE AS NEEDED
Status: DISCONTINUED | OUTPATIENT
Start: 2024-10-17 | End: 2024-10-18 | Stop reason: HOSPADM

## 2024-10-17 RX ORDER — LIDOCAINE HCL/PF 100 MG/5ML
SYRINGE (ML) INTRAVENOUS AS NEEDED
Status: DISCONTINUED | OUTPATIENT
Start: 2024-10-17 | End: 2024-10-17

## 2024-10-17 RX ORDER — SODIUM CHLORIDE 9 MG/ML
INJECTION, SOLUTION INTRAVENOUS CONTINUOUS PRN
Status: DISCONTINUED | OUTPATIENT
Start: 2024-10-17 | End: 2024-10-17

## 2024-10-17 RX ORDER — KETAMINE HCL IN NACL, ISO-OSM 100MG/10ML
SYRINGE (ML) INJECTION AS NEEDED
Status: DISCONTINUED | OUTPATIENT
Start: 2024-10-17 | End: 2024-10-17

## 2024-10-17 RX ORDER — MIDAZOLAM HYDROCHLORIDE 1 MG/ML
INJECTION, SOLUTION INTRAMUSCULAR; INTRAVENOUS AS NEEDED
Status: DISCONTINUED | OUTPATIENT
Start: 2024-10-17 | End: 2024-10-17

## 2024-10-17 SDOH — HEALTH STABILITY: MENTAL HEALTH: CURRENT SMOKER: 0

## 2024-10-17 ASSESSMENT — PAIN SCALES - GENERAL
PAINLEVEL_OUTOF10: 0 - NO PAIN

## 2024-10-17 ASSESSMENT — PAIN - FUNCTIONAL ASSESSMENT: PAIN_FUNCTIONAL_ASSESSMENT: 0-10

## 2024-10-17 NOTE — POST-PROCEDURE NOTE
Pt is tolerating PO snack well.  Reviewed discharge instructions,-EGD & COLONOSCOPY- educated pt and family on anesthesia safety.   All pre-op belongings with the pt.

## 2024-10-17 NOTE — DISCHARGE INSTRUCTIONS
Patient Instructions after an Endoscopy      Post-procedure recommendations:  - The patient will be observed post-procedure, until all discharge criteria are met.   - Discharge the patient to home with family member/escort.  - Resume previous diet.  - Continue present medications.  - Observe patient's clinical course following today's procedure with therapeutic intervention.   - Watch for bleeding, perforation, and infection.   - Follow-up with referring physician.   - Return to primary care physician.  - The patient has a contact number available for  emergencies.  The signs and symptoms of potential delayed complications were discussed with the patient.  Return to normal activities tomorrow.  Written discharge instructions were provided to the patient.    The anesthetics, sedatives or narcotics which were given to you today will be acting in your body for the next 24 hours, so you might feel a little sleepy or groggy.  This feeling should slowly wear off. Carefully read and follow the instructions.     You received sedation today:  - Do not drive or operate any machinery or power tools of any kind.   - No alcoholic beverages today, not even beer or wine.  - Do not make any important decisions or sign any legal documents.  - No over the counter medications that contain alcohol or that may cause drowsiness.  - Do not make any important decisions or sign any legal documents.    While it is common to experience mild to moderate abdominal distention, gas, or belching after your procedure, if any of these symptoms occur following discharge from the GI Lab or within one week of having your procedure, call the [facility] to be advised whether a visit to your nearest Urgent Care or Emergency Department is indicated.  Take this paper with you if you go:  - If you develop an allergic reaction to the medications that were given during your procedure such as difficulty breathing, rash, hives, severe nausea,  vomiting or lightheadedness.  - If you experience chest pain, shortness of breath, severe abdominal pain, fevers and chills.  - If you develop signs and symptoms of bleeding such as blood in your spit, if your stools turn black, tarry, or bloody.  - If you have not urinated within 8 hours following your procedure.  - If your IV site becomes painful, red, inflamed, or looks infected.       If you experience any problems or have any questions following discharge from the GI Lab, please call: [phone]

## 2024-10-17 NOTE — ANESTHESIA PREPROCEDURE EVALUATION
Patient: Nasir Sandoval    Procedure Information       Date/Time: 10/17/24 1300    Scheduled providers: Ever Thorne MD; Fan Hager MD    Procedures:       EGD      COLONOSCOPY    Location: [facility]            Relevant Problems   Anesthesia (within normal limits)      Cardiac   (+) AF (atrial fibrillation) (Multi)   (+) Chest pain   (+) Combined hyperlipidemia   (+) Hypertension      Pulmonary   (+) Obstructive sleep apnea of adult      Neuro   (+) Major depressive disorder, recurrent, in partial remission (CMS-HCC)   (+) Panic disorder with agoraphobia   (+) Peripheral neuropathy   (+) Posttraumatic stress disorder      GI   (+) Gastroesophageal reflux disease      /Renal   (+) Ureteral stone with hydronephrosis      Endocrine   (+) Diabetes mellitus, type 2 (Multi)   (+) Morbid obesity (Multi)      Hematology   (+) Anemia      ID   (+) Acute lower respiratory infection   (+) Disease due to severe acute respiratory syndrome coronavirus 2 (SARS-CoV-2)       Clinical information reviewed:   Tobacco  Allergies  Meds   Med Hx  Surg Hx   Fam Hx          NPO Detail:  No data recorded     Physical Exam    Airway  Mallampati: III  TM distance: <3 FB  Neck ROM: limited     Cardiovascular - normal exam     Dental - normal exam     Pulmonary - normal exam     Abdominal   (+) obese             Anesthesia Plan    History of general anesthesia?: yes  History of complications of general anesthesia?: no    ASA 4     MAC     The patient is not a current smoker.    intravenous induction   Anesthetic plan and risks discussed with patient.    Plan discussed with CRNA.

## 2024-10-17 NOTE — ANESTHESIA POSTPROCEDURE EVALUATION
Patient: Nasir Sandoval    Procedure Summary       Date: 10/17/24 Room / Location: [facility]    Anesthesia Start: 1328 Anesthesia Stop: 1421    Procedures:       EGD      COLONOSCOPY Diagnosis:       Achalasia, esophageal      Colon cancer screening    Scheduled Providers: Ever Thorne MD; Fan Hager MD Responsible Provider: Fan Hager MD    Anesthesia Type: MAC ASA Status: 4            Anesthesia Type: MAC    Vitals Value Taken Time   /65 10/17/24 1500   Temp 36.2 °C (97.2 °F) 10/17/24 1421   Pulse 80 10/17/24 1500   Resp 16 10/17/24 1500   SpO2 96 % 10/17/24 1500       Anesthesia Post Evaluation    Patient location during evaluation: PACU  Patient participation: complete - patient participated  Level of consciousness: awake and alert  Pain management: adequate  Airway patency: patent  Cardiovascular status: acceptable  Respiratory status: acceptable  Hydration status: acceptable  Postoperative Nausea and Vomiting: none        There were no known notable events for this encounter.

## 2024-10-24 LAB
LABORATORY COMMENT REPORT: NORMAL
PATH REPORT.FINAL DX SPEC: NORMAL
PATH REPORT.GROSS SPEC: NORMAL
PATH REPORT.TOTAL CANCER: NORMAL

## 2024-10-28 ENCOUNTER — APPOINTMENT (OUTPATIENT)
Dept: NEUROLOGY | Facility: HOSPITAL | Age: 64
End: 2024-10-28
Payer: MEDICARE

## 2024-10-29 ENCOUNTER — TELEPHONE (OUTPATIENT)
Dept: SURGERY | Facility: CLINIC | Age: 64
End: 2024-10-29
Payer: MEDICARE

## 2024-11-06 PROBLEM — E66.813 CLASS 3 SEVERE OBESITY WITH BODY MASS INDEX (BMI) OF 45.0 TO 49.9 IN ADULT: Status: ACTIVE | Noted: 2023-07-12

## 2024-11-26 ENCOUNTER — APPOINTMENT (OUTPATIENT)
Dept: CARDIOLOGY | Facility: CLINIC | Age: 64
End: 2024-11-26
Payer: MEDICARE

## 2024-12-09 ENCOUNTER — APPOINTMENT (OUTPATIENT)
Dept: CARDIOLOGY | Facility: CLINIC | Age: 64
End: 2024-12-09
Payer: MEDICARE

## 2024-12-11 ENCOUNTER — OFFICE VISIT (OUTPATIENT)
Dept: CARDIOLOGY | Facility: CLINIC | Age: 64
End: 2024-12-11
Payer: MEDICARE

## 2024-12-11 VITALS
HEIGHT: 74 IN | OXYGEN SATURATION: 95 % | HEART RATE: 108 BPM | BODY MASS INDEX: 40.43 KG/M2 | SYSTOLIC BLOOD PRESSURE: 140 MMHG | WEIGHT: 315 LBS | DIASTOLIC BLOOD PRESSURE: 80 MMHG

## 2024-12-11 DIAGNOSIS — I48.19 PERSISTENT ATRIAL FIBRILLATION (MULTI): ICD-10-CM

## 2024-12-11 DIAGNOSIS — E66.813 CLASS 3 SEVERE OBESITY WITHOUT SERIOUS COMORBIDITY WITH BODY MASS INDEX (BMI) OF 45.0 TO 49.9 IN ADULT, UNSPECIFIED OBESITY TYPE: ICD-10-CM

## 2024-12-11 DIAGNOSIS — G47.33 OBSTRUCTIVE SLEEP APNEA OF ADULT: ICD-10-CM

## 2024-12-11 DIAGNOSIS — I50.32 CHRONIC HEART FAILURE WITH PRESERVED EJECTION FRACTION: ICD-10-CM

## 2024-12-11 DIAGNOSIS — E11.9 TYPE 2 DIABETES MELLITUS WITHOUT COMPLICATION, WITHOUT LONG-TERM CURRENT USE OF INSULIN (MULTI): ICD-10-CM

## 2024-12-11 DIAGNOSIS — I10 HYPERTENSION, UNSPECIFIED TYPE: ICD-10-CM

## 2024-12-11 DIAGNOSIS — E66.01 CLASS 3 SEVERE OBESITY WITHOUT SERIOUS COMORBIDITY WITH BODY MASS INDEX (BMI) OF 45.0 TO 49.9 IN ADULT, UNSPECIFIED OBESITY TYPE: ICD-10-CM

## 2024-12-11 DIAGNOSIS — E78.2 COMBINED HYPERLIPIDEMIA: ICD-10-CM

## 2024-12-11 DIAGNOSIS — S05.71XS: ICD-10-CM

## 2024-12-11 DIAGNOSIS — I50.32 CHRONIC HEART FAILURE WITH PRESERVED EJECTION FRACTION: Primary | ICD-10-CM

## 2024-12-11 PROCEDURE — 4010F ACE/ARB THERAPY RXD/TAKEN: CPT | Performed by: INTERNAL MEDICINE

## 2024-12-11 PROCEDURE — 99215 OFFICE O/P EST HI 40 MIN: CPT | Performed by: INTERNAL MEDICINE

## 2024-12-11 PROCEDURE — 3061F NEG MICROALBUMINURIA REV: CPT | Performed by: INTERNAL MEDICINE

## 2024-12-11 PROCEDURE — 3079F DIAST BP 80-89 MM HG: CPT | Performed by: INTERNAL MEDICINE

## 2024-12-11 PROCEDURE — 3048F LDL-C <100 MG/DL: CPT | Performed by: INTERNAL MEDICINE

## 2024-12-11 PROCEDURE — 1036F TOBACCO NON-USER: CPT | Performed by: INTERNAL MEDICINE

## 2024-12-11 PROCEDURE — 3044F HG A1C LEVEL LT 7.0%: CPT | Performed by: INTERNAL MEDICINE

## 2024-12-11 PROCEDURE — 3077F SYST BP >= 140 MM HG: CPT | Performed by: INTERNAL MEDICINE

## 2024-12-11 PROCEDURE — 3008F BODY MASS INDEX DOCD: CPT | Performed by: INTERNAL MEDICINE

## 2024-12-11 NOTE — PROGRESS NOTES
"  Subjective  Nasir Sandoval  is a 64 y.o. year old male who presents for HFpEF.  Has daily vomiting.  Notes less stamina and more fatigue.  He has been vomiting with H/O GERD.  Concerned his Gi distress might be from CHF.  No chest pain    Blood pressure 140/80, pulse 108, height 1.88 m (6' 2\"), weight (!) 161 kg (355 lb), SpO2 95%.   Penicillins  Past Medical History:   Diagnosis Date    VALERIANO (acute kidney injury) (CMS-HCC) 05/15/2012    Avulsion of left eye, initial encounter 11/30/2018    Traumatic enucleation of left eye    Avulsion of left eye, initial encounter 11/30/2018    Traumatic enucleation of left eye    Carcinoma in situ of prostate 10/31/2023    Epidermal cyst 10/31/2023    Esophageal dysphagia 10/31/2023    Heart failure 10/31/2023    Heart failure, unspecified 10/31/2023    Hypotension 03/03/2023    Ingrowing toenail 10/31/2023    Nephrolithiasis 05/15/2012    Pain, unspecified 10/31/2023    Recurrent major depression (CMS-HCC) 10/31/2023    Uric acid urolithiasis 10/31/2023    Vitreous degeneration, left eye 11/30/2018    PVD (posterior vitreous detachment), left eye    Vitreous degeneration, left eye 11/30/2018    PVD (posterior vitreous detachment), left eye     History reviewed. No pertinent surgical history.  No family history on file.  @SOC    Current Outpatient Medications   Medication Sig Dispense Refill    albuterol 90 mcg/actuation inhaler Inhale 2 puffs every 6 hours if needed for shortness of breath.      ammonium lactate (Amlactin) 12 % cream Apply 1 Application topically if needed for dry skin (toes).      bacitracin 500 unit/gram ointment Apply 1 Application topically 2 times a day. (Patient not taking: Reported on 10/17/2024)      benzonatate (Tessalon) 100 mg capsule Take 1 capsule (100 mg) by mouth 3 times a day as needed for cough. Do not crush or chew. (Patient not taking: Reported on 10/17/2024) 20 capsule 0    carboxymethylcellulose (Refresh Plus) 0.5 % ophthalmic solution " Administer 1 drop into the left eye 3 times a day as needed for dry eyes.      cetirizine (ZyrTEC) 5 mg tablet Take 1 tablet (5 mg) by mouth once daily.      cholecalciferol (Vitamin D-3) 25 MCG (1000 UT) tablet Take 1 tablet (1,000 Units) by mouth once daily.      clindamycin (Cleocin T) 1 % external solution APPLY A SUFFICIENT AMOUNT EXTERNALLY TWICE A DAY TO SCROTUM AS NEEDED FOR LESION      digoxin (Lanoxin) 125 MCG tablet Take 1 tablet (125 mcg) by mouth once daily.      docosahexaenoic acid/epa (FISH OIL ORAL) Take 1 capsule by mouth 2 times a day.      docusate sodium (Colace) 100 mg capsule Take 2 capsules (200 mg) by mouth once daily in the morning.      DULoxetine (Cymbalta) 60 mg DR capsule Take 1 capsule (60 mg) by mouth once daily.      furosemide (Lasix) 40 mg tablet Take 3 tablets (120 mg) by mouth 2 times a day.      gabapentin (Neurontin) 400 mg capsule Take 2 capsules (800 mg) by mouth 3 times a day.      glipiZIDE (Glucotrol) 10 mg tablet Take 1 tablet (10 mg) by mouth 2 times a day before meals.      ketoconazole (NIZOral) 2 % shampoo SHAMPOO A SUFFICIENT AMOUNT EXTERNALLY EVERY OTHER DAY .  LATHER ONTO THE SCALP, FACE, BEHIND EARS FOR 5 MINUTES EVERY OTHER DAY  THEN RINSE .  LATHER ONTO THE SCALP, FACE, BEHIND EARS FOR 5 MINUTES EVERY OTHER DAY   THEN RINSE      KRILL OIL ORAL Take by mouth every 12 hours.      lisinopril 2.5 mg tablet Take 1 tablet (2.5 mg) by mouth once daily.      lubricating eye drops ophthalmic solution Administer 1 drop into the left eye 3 times a day as needed for dry eyes.      magnesium oxide (Mag-Ox) 400 mg (241.3 mg magnesium) tablet Take 1 tablet (400 mg) by mouth twice a day.      metFORMIN (Glucophage) 1,000 mg tablet Take 1 tablet (1,000 mg) by mouth 2 times daily (morning and late afternoon).      multivitamin tablet Take 1 tablet by mouth once daily.      multivitamin with minerals tablet Take 1 tablet by mouth once daily.      omega 3-dha-epa-fish oil (Fish  OiL) 1,000 mg (120 mg-180 mg) capsule Take 1 capsule (1,000 mg) by mouth 2 times a day.      potassium chloride CR (Klor-Con M20) 20 mEq ER tablet Take 2 tablets (40 mEq) by mouth 2 times a day.      potassium citrate CR (Urocit-K-5) 5 mEq ER tablet Take 3 tablets (15 mEq) by mouth once daily at bedtime. Do not crush, chew, or split.      psyllium (Metamucil) 3.4 gram packet Take by mouth.      psyllium husk (METAMUCIL ORAL) Take 1 Scoop by mouth once daily.      rosuvastatin (Crestor) 20 mg tablet Take 1 tablet (20 mg) by mouth once daily at bedtime.      spironolactone (Aldactone) 25 mg tablet Take 1 tablet (25 mg) by mouth once daily. 30 tablet 11    tamsulosin (Flomax) 0.4 mg 24 hr capsule Take 2 capsules (0.8 mg) by mouth once daily at bedtime.      topiramate (Topamax) 50 mg tablet Take 1 tablet (50 mg) by mouth 2 times a day. 60 tablet 11    warfarin (Coumadin) 10 mg tablet Take 1 tablet (10 mg) by mouth. Thursday      warfarin (Coumadin) 5 mg tablet Take 2.5 tablets (12.5 mg) by mouth. 6 times weekly       No current facility-administered medications for this visit.        ROS  Review of Systems   All other systems reviewed and are negative.      Physical Exam  Physical Exam  Constitutional:       Appearance: He is obese.   HENT:      Head: Normocephalic and atraumatic.   Cardiovascular:      Rate and Rhythm: Normal rate. Rhythm irregularly irregular.   Pulmonary:      Effort: Pulmonary effort is normal.      Breath sounds: Normal breath sounds.   Abdominal:      Palpations: Abdomen is soft.   Skin:     General: Skin is warm and dry.   Neurological:      General: No focal deficit present.      Mental Status: He is alert and oriented to person, place, and time.   Psychiatric:         Mood and Affect: Mood normal.         Behavior: Behavior normal.          EKG  Encounter Date: 09/18/24   ECG 12 Lead    Narrative    Atrial fibrillation at 106/min., LAD, ols IWMI, old ASWMI       Problem List Items Addressed  This Visit       (HFpEF) heart failure with preserved ejection fraction - Primary    AF (atrial fibrillation) (Multi)    Combined hyperlipidemia    Hypertension    Traumatic enucleation of right eye    Obstructive sleep apnea of adult    Class 3 severe obesity with body mass index (BMI) of 45.0 to 49.9 in adult    Diabetes mellitus, type 2 (Multi)         CBC, BMP, BNP  Echocardiogram  CXR PA/Lat  Return 1 month with EKG      Dutch Milan MD

## 2025-01-07 ENCOUNTER — OFFICE VISIT (OUTPATIENT)
Dept: CARDIOLOGY | Facility: CLINIC | Age: 65
End: 2025-01-07
Payer: MEDICARE

## 2025-01-07 ENCOUNTER — HOSPITAL ENCOUNTER (OUTPATIENT)
Dept: RADIOLOGY | Facility: HOSPITAL | Age: 65
Discharge: HOME | End: 2025-01-07
Payer: MEDICARE

## 2025-01-07 ENCOUNTER — HOSPITAL ENCOUNTER (OUTPATIENT)
Dept: CARDIOLOGY | Facility: CLINIC | Age: 65
Discharge: HOME | End: 2025-01-07
Payer: MEDICARE

## 2025-01-07 VITALS
BODY MASS INDEX: 40.43 KG/M2 | OXYGEN SATURATION: 95 % | WEIGHT: 315 LBS | DIASTOLIC BLOOD PRESSURE: 78 MMHG | HEART RATE: 121 BPM | SYSTOLIC BLOOD PRESSURE: 120 MMHG | HEIGHT: 74 IN

## 2025-01-07 DIAGNOSIS — K21.9 GASTROESOPHAGEAL REFLUX DISEASE WITHOUT ESOPHAGITIS: ICD-10-CM

## 2025-01-07 DIAGNOSIS — I50.32 CHRONIC HEART FAILURE WITH PRESERVED EJECTION FRACTION: ICD-10-CM

## 2025-01-07 DIAGNOSIS — I48.0 PAROXYSMAL ATRIAL FIBRILLATION (MULTI): ICD-10-CM

## 2025-01-07 DIAGNOSIS — I48.19 PERSISTENT ATRIAL FIBRILLATION (MULTI): ICD-10-CM

## 2025-01-07 DIAGNOSIS — E66.01 CLASS 3 SEVERE OBESITY WITHOUT SERIOUS COMORBIDITY WITH BODY MASS INDEX (BMI) OF 45.0 TO 49.9 IN ADULT, UNSPECIFIED OBESITY TYPE: ICD-10-CM

## 2025-01-07 DIAGNOSIS — S05.71XD: ICD-10-CM

## 2025-01-07 DIAGNOSIS — I10 HYPERTENSION, UNSPECIFIED TYPE: ICD-10-CM

## 2025-01-07 DIAGNOSIS — E66.813 CLASS 3 SEVERE OBESITY WITHOUT SERIOUS COMORBIDITY WITH BODY MASS INDEX (BMI) OF 45.0 TO 49.9 IN ADULT, UNSPECIFIED OBESITY TYPE: ICD-10-CM

## 2025-01-07 DIAGNOSIS — E11.9 TYPE 2 DIABETES MELLITUS WITHOUT COMPLICATION, WITHOUT LONG-TERM CURRENT USE OF INSULIN (MULTI): ICD-10-CM

## 2025-01-07 DIAGNOSIS — I50.32 CHRONIC HEART FAILURE WITH PRESERVED EJECTION FRACTION: Primary | ICD-10-CM

## 2025-01-07 LAB
AORTIC VALVE MEAN GRADIENT: 8 MMHG
AORTIC VALVE PEAK VELOCITY: 1.86 M/S
AV PEAK GRADIENT: 14 MMHG
AVA (PEAK VEL): 3.17 CM2
AVA (VTI): 2.93 CM2
BODY SURFACE AREA: 2.89 M2
EJECTION FRACTION APICAL 4 CHAMBER: 52.3
EJECTION FRACTION: 48 %
LEFT ATRIUM VOLUME AREA LENGTH INDEX BSA: 35.2 ML/M2
LEFT VENTRICLE INTERNAL DIMENSION DIASTOLE: 5.18 CM (ref 3.5–6)
LEFT VENTRICULAR OUTFLOW TRACT DIAMETER: 2.48 CM
RIGHT VENTRICLE FREE WALL PEAK S': 0.1 CM/S
RIGHT VENTRICLE PEAK SYSTOLIC PRESSURE: 29.7 MMHG
TRICUSPID ANNULAR PLANE SYSTOLIC EXCURSION: 1.9 CM

## 2025-01-07 PROCEDURE — 71046 X-RAY EXAM CHEST 2 VIEWS: CPT

## 2025-01-07 PROCEDURE — 3074F SYST BP LT 130 MM HG: CPT | Performed by: INTERNAL MEDICINE

## 2025-01-07 PROCEDURE — 99215 OFFICE O/P EST HI 40 MIN: CPT | Mod: 25 | Performed by: INTERNAL MEDICINE

## 2025-01-07 PROCEDURE — 3008F BODY MASS INDEX DOCD: CPT | Performed by: INTERNAL MEDICINE

## 2025-01-07 PROCEDURE — 2500000004 HC RX 250 GENERAL PHARMACY W/ HCPCS (ALT 636 FOR OP/ED): Performed by: INTERNAL MEDICINE

## 2025-01-07 PROCEDURE — 1036F TOBACCO NON-USER: CPT | Performed by: INTERNAL MEDICINE

## 2025-01-07 PROCEDURE — C8929 TTE W OR WO FOL WCON,DOPPLER: HCPCS

## 2025-01-07 PROCEDURE — 99215 OFFICE O/P EST HI 40 MIN: CPT | Performed by: INTERNAL MEDICINE

## 2025-01-07 PROCEDURE — 71046 X-RAY EXAM CHEST 2 VIEWS: CPT | Performed by: RADIOLOGY

## 2025-01-07 PROCEDURE — 4010F ACE/ARB THERAPY RXD/TAKEN: CPT | Performed by: INTERNAL MEDICINE

## 2025-01-07 PROCEDURE — 3078F DIAST BP <80 MM HG: CPT | Performed by: INTERNAL MEDICINE

## 2025-01-07 PROCEDURE — 93005 ELECTROCARDIOGRAM TRACING: CPT | Performed by: INTERNAL MEDICINE

## 2025-01-07 PROCEDURE — 93306 TTE W/DOPPLER COMPLETE: CPT | Performed by: INTERNAL MEDICINE

## 2025-01-07 PROCEDURE — 93010 ELECTROCARDIOGRAM REPORT: CPT | Performed by: INTERNAL MEDICINE

## 2025-01-07 RX ADMIN — PERFLUTREN 2 ML OF DILUTION: 6.52 INJECTION, SUSPENSION INTRAVENOUS at 08:50

## 2025-01-07 NOTE — PROGRESS NOTES
"  Subjective  Nasir Sandoval  is a 64 y.o. year old male who presents for F/U HFpEf.   He didinot get BMP BNP, CXR as ordered.  Vominting has decreased    Blood pressure 120/78, pulse (!) 121, height 1.88 m (6' 2\"), weight (!) 160 kg (352 lb), SpO2 95%.   Penicillins  Past Medical History:   Diagnosis Date    VALERIANO (acute kidney injury) (CMS-HCC) 05/15/2012    Avulsion of left eye, initial encounter 11/30/2018    Traumatic enucleation of left eye    Avulsion of left eye, initial encounter 11/30/2018    Traumatic enucleation of left eye    Carcinoma in situ of prostate 10/31/2023    Epidermal cyst 10/31/2023    Esophageal dysphagia 10/31/2023    Heart failure 10/31/2023    Heart failure, unspecified 10/31/2023    Hypotension 03/03/2023    Ingrowing toenail 10/31/2023    Nephrolithiasis 05/15/2012    Pain, unspecified 10/31/2023    Recurrent major depression (CMS-HCC) 10/31/2023    Uric acid urolithiasis 10/31/2023    Vitreous degeneration, left eye 11/30/2018    PVD (posterior vitreous detachment), left eye    Vitreous degeneration, left eye 11/30/2018    PVD (posterior vitreous detachment), left eye     History reviewed. No pertinent surgical history.  No family history on file.  @SOC    Current Outpatient Medications   Medication Sig Dispense Refill    albuterol 90 mcg/actuation inhaler Inhale 2 puffs every 6 hours if needed for shortness of breath.      ammonium lactate (Amlactin) 12 % cream Apply 1 Application topically if needed for dry skin (toes).      bacitracin 500 unit/gram ointment Apply 1 Application topically 2 times a day. (Patient not taking: Reported on 10/17/2024)      benzonatate (Tessalon) 100 mg capsule Take 1 capsule (100 mg) by mouth 3 times a day as needed for cough. Do not crush or chew. (Patient not taking: Reported on 10/17/2024) 20 capsule 0    carboxymethylcellulose (Refresh Plus) 0.5 % ophthalmic solution Administer 1 drop into the left eye 3 times a day as needed for dry eyes.      " cetirizine (ZyrTEC) 5 mg tablet Take 1 tablet (5 mg) by mouth once daily.      cholecalciferol (Vitamin D-3) 25 MCG (1000 UT) tablet Take 1 tablet (1,000 Units) by mouth once daily.      clindamycin (Cleocin T) 1 % external solution APPLY A SUFFICIENT AMOUNT EXTERNALLY TWICE A DAY TO SCROTUM AS NEEDED FOR LESION      digoxin (Lanoxin) 125 MCG tablet Take 1 tablet (125 mcg) by mouth once daily.      docosahexaenoic acid/epa (FISH OIL ORAL) Take 1 capsule by mouth 2 times a day.      docusate sodium (Colace) 100 mg capsule Take 2 capsules (200 mg) by mouth once daily in the morning.      DULoxetine (Cymbalta) 60 mg DR capsule Take 1 capsule (60 mg) by mouth once daily.      furosemide (Lasix) 40 mg tablet Take 3 tablets (120 mg) by mouth 2 times a day.      gabapentin (Neurontin) 400 mg capsule Take 2 capsules (800 mg) by mouth 3 times a day.      glipiZIDE (Glucotrol) 10 mg tablet Take 1 tablet (10 mg) by mouth 2 times a day before meals.      ketoconazole (NIZOral) 2 % shampoo SHAMPOO A SUFFICIENT AMOUNT EXTERNALLY EVERY OTHER DAY .  LATHER ONTO THE SCALP, FACE, BEHIND EARS FOR 5 MINUTES EVERY OTHER DAY  THEN RINSE .  LATHER ONTO THE SCALP, FACE, BEHIND EARS FOR 5 MINUTES EVERY OTHER DAY   THEN RINSE      KRILL OIL ORAL Take by mouth every 12 hours.      lisinopril 2.5 mg tablet Take 1 tablet (2.5 mg) by mouth once daily.      lubricating eye drops ophthalmic solution Administer 1 drop into the left eye 3 times a day as needed for dry eyes.      magnesium oxide (Mag-Ox) 400 mg (241.3 mg magnesium) tablet Take 1 tablet (400 mg) by mouth twice a day.      metFORMIN (Glucophage) 1,000 mg tablet Take 1 tablet (1,000 mg) by mouth 2 times daily (morning and late afternoon).      multivitamin tablet Take 1 tablet by mouth once daily.      multivitamin with minerals tablet Take 1 tablet by mouth once daily.      omega 3-dha-epa-fish oil (Fish OiL) 1,000 mg (120 mg-180 mg) capsule Take 1 capsule (1,000 mg) by mouth 2 times a  day.      potassium chloride CR (Klor-Con M20) 20 mEq ER tablet Take 2 tablets (40 mEq) by mouth 2 times a day.      potassium citrate CR (Urocit-K-5) 5 mEq ER tablet Take 3 tablets (15 mEq) by mouth once daily at bedtime. Do not crush, chew, or split.      psyllium (Metamucil) 3.4 gram packet Take by mouth.      psyllium husk (METAMUCIL ORAL) Take 1 Scoop by mouth once daily.      rosuvastatin (Crestor) 20 mg tablet Take 1 tablet (20 mg) by mouth once daily at bedtime.      spironolactone (Aldactone) 25 mg tablet Take 1 tablet (25 mg) by mouth once daily. 30 tablet 11    tamsulosin (Flomax) 0.4 mg 24 hr capsule Take 2 capsules (0.8 mg) by mouth once daily at bedtime.      topiramate (Topamax) 50 mg tablet Take 1 tablet (50 mg) by mouth 2 times a day. 60 tablet 11    warfarin (Coumadin) 10 mg tablet Take 1 tablet (10 mg) by mouth. Thursday      warfarin (Coumadin) 5 mg tablet Take 2.5 tablets (12.5 mg) by mouth. 6 times weekly       No current facility-administered medications for this visit.        ROS  Review of Systems   All other systems reviewed and are negative.      Physical Exam  Physical Exam  Constitutional:       Appearance: He is obese.   HENT:      Head: Normocephalic and atraumatic.   Cardiovascular:      Rate and Rhythm: Normal rate. Rhythm irregular.   Pulmonary:      Effort: Pulmonary effort is normal.      Breath sounds: Normal breath sounds.   Abdominal:      Palpations: Abdomen is soft.   Skin:     General: Skin is warm and dry.   Neurological:      General: No focal deficit present.      Mental Status: He is alert and oriented to person, place, and time.   Psychiatric:         Mood and Affect: Mood normal.         Behavior: Behavior normal.          EKG  Encounter Date: 09/18/24   ECG 12 Lead    Narrative    Atrial fibrillation at 106/min., LAD, ols IWMI, old ASWMI       Problem List Items Addressed This Visit       (HFpEF) heart failure with preserved ejection fraction - Primary     1/7/25  echocardiogram LVEF = 45-50%, Severe Mac. Moderate as ending aorta, moderate to severe Lae, TR with slighgly elevated RVSP (Reviewed today)         Relevant Orders    ECG 12 Lead    Follow Up In Cardiology    AF (atrial fibrillation) (Multi)    Hypertension    Traumatic enucleation of right eye    Class 3 severe obesity with body mass index (BMI) of 45.0 to 49.9 in adult    Diabetes mellitus, type 2 (Multi)    Gastroesophageal reflux disease         CXR PA/LAT, BMP, BNP  Return 2 months with EKG      Dutch Milan MD

## 2025-01-07 NOTE — ASSESSMENT & PLAN NOTE
1/7/25 echocardiogram LVEF = 45-50%, Severe Mac. Moderate as ending aorta, moderate to severe Lae, TR with slighgly elevated RVSP (Reviewed today)

## 2025-01-08 LAB
Q ONSET: 210 MS
QRS COUNT: 19 BEATS
QRS DURATION: 90 MS
QT INTERVAL: 318 MS
QTC CALCULATION(BAZETT): 451 MS
QTC FREDERICIA: 401 MS
R AXIS: -80 DEGREES
T AXIS: 85 DEGREES
T OFFSET: 369 MS
VENTRICULAR RATE: 121 BPM

## 2025-01-16 ENCOUNTER — TELEPHONE (OUTPATIENT)
Dept: GASTROENTEROLOGY | Facility: CLINIC | Age: 65
End: 2025-01-16
Payer: MEDICARE

## 2025-03-03 ENCOUNTER — APPOINTMENT (OUTPATIENT)
Dept: OPHTHALMOLOGY | Facility: CLINIC | Age: 65
End: 2025-03-03
Payer: COMMERCIAL

## 2025-03-03 DIAGNOSIS — H52.222 REGULAR ASTIGMATISM OF LEFT EYE: ICD-10-CM

## 2025-03-03 DIAGNOSIS — H52.02 HYPERMETROPIA OF LEFT EYE: Primary | ICD-10-CM

## 2025-03-03 DIAGNOSIS — H52.4 PRESBYOPIA: ICD-10-CM

## 2025-03-03 PROCEDURE — 92015 DETERMINE REFRACTIVE STATE: CPT | Performed by: OPTOMETRIST

## 2025-03-03 ASSESSMENT — VISUAL ACUITY
OS_CC: 20/20
CORRECTION_TYPE: GLASSES
OS_CC: 20/20-1
METHOD: SNELLEN - LINEAR

## 2025-03-03 ASSESSMENT — REFRACTION_WEARINGRX
OS_ADD: +2.75
OS_SPHERE: +0.75
OS_CYLINDER: -1.75
OS_AXIS: 071

## 2025-03-03 ASSESSMENT — CONF VISUAL FIELD
METHOD: COUNTING FINGERS
OS_INFERIOR_NASAL_RESTRICTION: 0
OS_SUPERIOR_TEMPORAL_RESTRICTION: 0
OS_INFERIOR_TEMPORAL_RESTRICTION: 0
OS_NORMAL: 1
OS_SUPERIOR_NASAL_RESTRICTION: 0

## 2025-03-03 ASSESSMENT — ENCOUNTER SYMPTOMS
CARDIOVASCULAR NEGATIVE: 0
ALLERGIC/IMMUNOLOGIC NEGATIVE: 0
MUSCULOSKELETAL NEGATIVE: 0
EYES NEGATIVE: 1
NEUROLOGICAL NEGATIVE: 0
CONSTITUTIONAL NEGATIVE: 0
RESPIRATORY NEGATIVE: 0
PSYCHIATRIC NEGATIVE: 1
ENDOCRINE NEGATIVE: 0
HEMATOLOGIC/LYMPHATIC NEGATIVE: 0
GASTROINTESTINAL NEGATIVE: 0

## 2025-03-03 ASSESSMENT — REFRACTION_MANIFEST
OS_CYLINDER: -1.50
OS_AXIS: 063
OS_SPHERE: +1.00
OS_ADD: +2.25

## 2025-03-03 NOTE — PROGRESS NOTES
Assessment/Plan   Diagnoses and all orders for this visit:  Hypermetropia of left eye  Regular astigmatism of left eye  Presbyopia  New spec rx released today per patient request. Monitor 1 year or sooner prn. Refraction billed today. Pt consents to receiving glasses Rx today. Patient's/guardian's signature obtained to acknowledge and confirm that a paper copy of glasses Rx was given to patient in compliance with [facility] Eyeglass Rule. Electronic copy of Rx will also be available via [employer]. Polycarbonate lenses, monocular precautions.  Continue to monitor w/ Dr. Irvin.   Continue w/ prosthesis prn.

## 2025-03-04 ENCOUNTER — APPOINTMENT (OUTPATIENT)
Dept: OPHTHALMOLOGY | Facility: CLINIC | Age: 65
End: 2025-03-04
Payer: COMMERCIAL

## 2025-03-04 DIAGNOSIS — H33.22 LEFT RETINAL DETACHMENT: ICD-10-CM

## 2025-03-04 DIAGNOSIS — D31.32 NEVUS OF CHOROID OF LEFT EYE: Primary | ICD-10-CM

## 2025-03-04 PROCEDURE — 99213 OFFICE O/P EST LOW 20 MIN: CPT

## 2025-03-04 PROCEDURE — 92134 CPTRZ OPH DX IMG PST SGM RTA: CPT | Mod: BILATERAL PROCEDURE

## 2025-03-04 ASSESSMENT — SLIT LAMP EXAM - LIDS
COMMENTS: GOOD POSITION
COMMENTS: GOOD POSITION

## 2025-03-04 ASSESSMENT — VISUAL ACUITY
METHOD: SNELLEN - LINEAR
OS_SC: 20/15

## 2025-03-04 ASSESSMENT — CUP TO DISC RATIO: OS_RATIO: .4

## 2025-03-04 ASSESSMENT — EXTERNAL EXAM - LEFT EYE: OS_EXAM: NORMAL

## 2025-03-04 ASSESSMENT — TONOMETRY
OS_IOP_MMHG: 20
IOP_METHOD: GOLDMANN APPLANATION

## 2025-03-04 ASSESSMENT — EXTERNAL EXAM - RIGHT EYE: OD_EXAM: PROSTHESIS

## 2025-03-06 PROBLEM — R13.14 DYSPHAGIA, PHARYNGOESOPHAGEAL PHASE: Status: ACTIVE | Noted: 2025-03-06

## 2025-03-26 ENCOUNTER — APPOINTMENT (OUTPATIENT)
Dept: CARDIOLOGY | Facility: CLINIC | Age: 65
End: 2025-03-26
Payer: MEDICARE

## 2025-04-02 ENCOUNTER — APPOINTMENT (OUTPATIENT)
Dept: CARDIOLOGY | Facility: CLINIC | Age: 65
End: 2025-04-02
Payer: MEDICARE

## 2025-04-16 ENCOUNTER — APPOINTMENT (OUTPATIENT)
Dept: CARDIOLOGY | Facility: CLINIC | Age: 65
End: 2025-04-16
Payer: MEDICARE

## 2025-04-28 ENCOUNTER — APPOINTMENT (OUTPATIENT)
Dept: CARDIOLOGY | Facility: CLINIC | Age: 65
End: 2025-04-28
Payer: MEDICARE

## 2025-05-14 LAB
ANION GAP SERPL CALCULATED.4IONS-SCNC: 14 MMOL/L (CALC) (ref 7–17)
BNP SERPL-MCNC: 17 PG/ML
BUN SERPL-MCNC: 26 MG/DL (ref 7–25)
BUN/CREAT SERPL: 22 (CALC) (ref 6–22)
CALCIUM SERPL-MCNC: 9.1 MG/DL (ref 8.6–10.3)
CHLORIDE SERPL-SCNC: 100 MMOL/L (ref 98–110)
CO2 SERPL-SCNC: 26 MMOL/L (ref 20–32)
CREAT SERPL-MCNC: 1.2 MG/DL (ref 0.7–1.35)
DIGOXIN SERPL-MCNC: <0.5 MCG/L (ref 0.8–2)
EGFRCR SERPLBLD CKD-EPI 2021: 68 ML/MIN/1.73M2
ERYTHROCYTE [DISTWIDTH] IN BLOOD BY AUTOMATED COUNT: 15 % (ref 11–15)
GLUCOSE SERPL-MCNC: 177 MG/DL (ref 65–99)
HCT VFR BLD AUTO: 43.1 % (ref 38.5–50)
HGB BLD-MCNC: 13.5 G/DL (ref 13.2–17.1)
MCH RBC QN AUTO: 29.5 PG (ref 27–33)
MCHC RBC AUTO-ENTMCNC: 31.3 G/DL (ref 32–36)
MCV RBC AUTO: 94.1 FL (ref 80–100)
PLATELET # BLD AUTO: 185 THOUSAND/UL (ref 140–400)
PMV BLD REES-ECKER: 9.7 FL (ref 7.5–12.5)
POTASSIUM SERPL-SCNC: 4.4 MMOL/L (ref 3.5–5.3)
RBC # BLD AUTO: 4.58 MILLION/UL (ref 4.2–5.8)
SODIUM SERPL-SCNC: 140 MMOL/L (ref 135–146)
WBC # BLD AUTO: 9 THOUSAND/UL (ref 3.8–10.8)

## 2025-05-19 ENCOUNTER — APPOINTMENT (OUTPATIENT)
Dept: CARDIOLOGY | Facility: CLINIC | Age: 65
End: 2025-05-19
Payer: MEDICARE

## 2025-05-19 VITALS
SYSTOLIC BLOOD PRESSURE: 160 MMHG | HEART RATE: 121 BPM | WEIGHT: 315 LBS | OXYGEN SATURATION: 94 % | BODY MASS INDEX: 45.32 KG/M2 | DIASTOLIC BLOOD PRESSURE: 88 MMHG

## 2025-05-19 DIAGNOSIS — I10 HYPERTENSION, UNSPECIFIED TYPE: ICD-10-CM

## 2025-05-19 DIAGNOSIS — S05.71XD: ICD-10-CM

## 2025-05-19 DIAGNOSIS — I50.32 CHRONIC DIASTOLIC (CONGESTIVE) HEART FAILURE: ICD-10-CM

## 2025-05-19 DIAGNOSIS — I50.32 CHRONIC HEART FAILURE WITH PRESERVED EJECTION FRACTION: Primary | ICD-10-CM

## 2025-05-19 DIAGNOSIS — E66.01 MORBID OBESITY (MULTI): ICD-10-CM

## 2025-05-19 DIAGNOSIS — F43.10 POSTTRAUMATIC STRESS DISORDER: ICD-10-CM

## 2025-05-19 DIAGNOSIS — I48.21 PERMANENT ATRIAL FIBRILLATION (MULTI): ICD-10-CM

## 2025-05-19 DIAGNOSIS — R06.02 SHORTNESS OF BREATH: ICD-10-CM

## 2025-05-19 DIAGNOSIS — I77.810 DILATION OF THORACIC AORTA: ICD-10-CM

## 2025-05-19 DIAGNOSIS — E11.9 TYPE 2 DIABETES MELLITUS WITHOUT COMPLICATION, WITHOUT LONG-TERM CURRENT USE OF INSULIN: ICD-10-CM

## 2025-05-19 DIAGNOSIS — G47.33 OBSTRUCTIVE SLEEP APNEA OF ADULT: ICD-10-CM

## 2025-05-19 LAB
Q ONSET: 215 MS
QRS COUNT: 21 BEATS
QRS DURATION: 82 MS
QT INTERVAL: 310 MS
QTC CALCULATION(BAZETT): 450 MS
QTC FREDERICIA: 398 MS
R AXIS: -76 DEGREES
T AXIS: 88 DEGREES
T OFFSET: 370 MS
VENTRICULAR RATE: 127 BPM

## 2025-05-19 PROCEDURE — 99212 OFFICE O/P EST SF 10 MIN: CPT | Mod: 25 | Performed by: INTERNAL MEDICINE

## 2025-05-19 PROCEDURE — 93005 ELECTROCARDIOGRAM TRACING: CPT | Performed by: INTERNAL MEDICINE

## 2025-05-19 NOTE — PROGRESS NOTES
Subjective  Nasir Sandoval  is a 64 y.o. year old male who presents for HFpEF.  Noting increased dyspnea with exertion. He needs to rest more frequently when he mows the lawn No chest pain, no palpitations, no edema    Blood pressure 160/88, pulse (!) 121, weight (!) 160 kg (353 lb), SpO2 94%.   Penicillins  Medical History[1]  Surgical History[2]  Family History[3]  @SOC    Current Medications[4]     ROS  Review of Systems   All other systems reviewed and are negative.      Physical Exam  Physical Exam  Constitutional:       Appearance: He is obese.   HENT:      Head: Normocephalic and atraumatic.   Cardiovascular:      Rate and Rhythm: Tachycardia present. Rhythm irregularly irregular.   Pulmonary:      Effort: Pulmonary effort is normal.      Breath sounds: Normal breath sounds.   Abdominal:      Palpations: Abdomen is soft.   Skin:     General: Skin is warm and dry.   Neurological:      General: No focal deficit present.      Mental Status: He is alert and oriented to person, place, and time.   Psychiatric:         Mood and Affect: Mood normal.         Behavior: Behavior normal.          EKG  Encounter Date: 05/19/25   ECG 12 lead (Clinic Performed)   Result Value    Ventricular Rate 127    QRS Duration 82    QT Interval 310    QTC Calculation(Bazett) 450    R Axis -76    T Axis 88    QRS Count 21    Q Onset 215    T Offset 370    QTC Fredericia 398    Narrative    Atrial fibrillation with rapid ventricular response  Left axis deviation  Low voltage QRS  Inferior infarct (cited on or before 07-JAN-2025)  Possible Anterolateral infarct (cited on or before 30-MAY-2024)  Abnormal ECG  When compared with ECG of 07-JAN-2025 09:57,  No significant change was found  Confirmed by Dutch Milan ([id number]) on 5/19/2025 4:27:04 PM       Problem List Items Addressed This Visit       (HFpEF) heart failure with preserved ejection fraction - Primary    Relevant Orders    ECG 12 lead (Clinic Performed) (Completed)    AF (atrial  fibrillation) (Multi)    RVR noted at 5/19/25 visit         Relevant Orders    Follow Up In Cardiology    Hypertension    Traumatic enucleation of right eye    Obstructive sleep apnea of adult    Chronic diastolic (congestive) heart failure    1/7/25 echocardiogram LVEF = 45-50%, severe MAC, moderate ascending aorta dilation, moderate to severe LAE, Tr with slightly elevated RVSP         Morbid obesity (Multi)    Diabetes mellitus, type 2 (Multi)    Posttraumatic stress disorder    Dilation of thoracic aorta    Shortness of breath         Increase Digoxin to 2 tablets daily  Return 1 month with EKG    Dutch Milan MD        [1]   Past Medical History:  Diagnosis Date    VALERIANO (acute kidney injury) 05/15/2012    Avulsion of left eye, initial encounter 11/30/2018    Traumatic enucleation of left eye    Avulsion of left eye, initial encounter 11/30/2018    Traumatic enucleation of left eye    Carcinoma in situ of prostate 10/31/2023    Epidermal cyst 10/31/2023    Esophageal dysphagia 10/31/2023    Heart failure 10/31/2023    Heart failure, unspecified 10/31/2023    Hypotension 03/03/2023    Ingrowing toenail 10/31/2023    Nephrolithiasis 05/15/2012    Pain, unspecified 10/31/2023    Recurrent major depression 10/31/2023    Uric acid urolithiasis 10/31/2023    Vitreous degeneration, left eye 11/30/2018    PVD (posterior vitreous detachment), left eye    Vitreous degeneration, left eye 11/30/2018    PVD (posterior vitreous detachment), left eye   [2] History reviewed. No pertinent surgical history.  [3] No family history on file.  [4]   Current Outpatient Medications   Medication Sig Dispense Refill    albuterol 90 mcg/actuation inhaler Inhale 2 puffs every 6 hours if needed for shortness of breath.      ammonium lactate (Amlactin) 12 % cream Apply 1 Application topically if needed for dry skin (toes).      bacitracin 500 unit/gram ointment Apply 1 Application topically 2 times a day. (Patient not taking: Reported on  10/17/2024)      benzonatate (Tessalon) 100 mg capsule Take 1 capsule (100 mg) by mouth 3 times a day as needed for cough. Do not crush or chew. (Patient not taking: Reported on 10/17/2024) 20 capsule 0    carboxymethylcellulose (Refresh Plus) 0.5 % ophthalmic solution Administer 1 drop into the left eye 3 times a day as needed for dry eyes.      cetirizine (ZyrTEC) 5 mg tablet Take 1 tablet (5 mg) by mouth once daily.      cholecalciferol (Vitamin D-3) 25 MCG (1000 UT) tablet Take 1 tablet (1,000 Units) by mouth once daily.      clindamycin (Cleocin T) 1 % external solution APPLY A SUFFICIENT AMOUNT EXTERNALLY TWICE A DAY TO SCROTUM AS NEEDED FOR LESION      digoxin (Lanoxin) 125 MCG tablet Take 1 tablet (125 mcg) by mouth once daily.      docosahexaenoic acid/epa (FISH OIL ORAL) Take 1 capsule by mouth 2 times a day.      docusate sodium (Colace) 100 mg capsule Take 2 capsules (200 mg) by mouth once daily in the morning.      DULoxetine (Cymbalta) 60 mg DR capsule Take 1 capsule (60 mg) by mouth once daily.      furosemide (Lasix) 40 mg tablet Take 3 tablets (120 mg) by mouth 2 times a day.      gabapentin (Neurontin) 400 mg capsule Take 2 capsules (800 mg) by mouth 3 times a day.      glipiZIDE (Glucotrol) 10 mg tablet Take 1 tablet (10 mg) by mouth 2 times a day before meals.      ketoconazole (NIZOral) 2 % shampoo SHAMPOO A SUFFICIENT AMOUNT EXTERNALLY EVERY OTHER DAY .  LATHER ONTO THE SCALP, FACE, BEHIND EARS FOR 5 MINUTES EVERY OTHER DAY  THEN RINSE .  LATHER ONTO THE SCALP, FACE, BEHIND EARS FOR 5 MINUTES EVERY OTHER DAY   THEN RINSE      KRILL OIL ORAL Take by mouth every 12 hours.      lisinopril 2.5 mg tablet Take 1 tablet (2.5 mg) by mouth once daily.      lubricating eye drops ophthalmic solution Administer 1 drop into the left eye 3 times a day as needed for dry eyes.      magnesium oxide (Mag-Ox) 400 mg (241.3 mg magnesium) tablet Take 1 tablet (400 mg) by mouth twice a day.      metFORMIN  (Glucophage) 1,000 mg tablet Take 1 tablet (1,000 mg) by mouth 2 times daily (morning and late afternoon).      multivitamin tablet Take 1 tablet by mouth once daily.      multivitamin with minerals tablet Take 1 tablet by mouth once daily.      omega 3-dha-epa-fish oil (Fish OiL) 1,000 mg (120 mg-180 mg) capsule Take 1 capsule (1,000 mg) by mouth 2 times a day.      potassium chloride CR (Klor-Con M20) 20 mEq ER tablet Take 2 tablets (40 mEq) by mouth 2 times a day.      potassium citrate CR (Urocit-K-5) 5 mEq ER tablet Take 3 tablets (15 mEq) by mouth once daily at bedtime. Do not crush, chew, or split.      psyllium (Metamucil) 3.4 gram packet Take by mouth.      psyllium husk (METAMUCIL ORAL) Take 1 Scoop by mouth once daily.      rosuvastatin (Crestor) 20 mg tablet Take 1 tablet (20 mg) by mouth once daily at bedtime.      spironolactone (Aldactone) 25 mg tablet Take 1 tablet (25 mg) by mouth once daily. 30 tablet 11    tamsulosin (Flomax) 0.4 mg 24 hr capsule Take 2 capsules (0.8 mg) by mouth once daily at bedtime.      topiramate (Topamax) 50 mg tablet Take 1 tablet (50 mg) by mouth 2 times a day. 60 tablet 11    warfarin (Coumadin) 10 mg tablet Take 1 tablet (10 mg) by mouth. Thursday      warfarin (Coumadin) 5 mg tablet Take 2.5 tablets (12.5 mg) by mouth. 6 times weekly       No current facility-administered medications for this visit.

## 2025-05-19 NOTE — ASSESSMENT & PLAN NOTE
1/7/25 echocardiogram LVEF = 45-50%, severe MAC, moderate ascending aorta dilation, moderate to severe LAE, Tr with slightly elevated RVSP

## 2025-05-22 PROBLEM — R11.2 NAUSEA AND VOMITING: Status: RESOLVED | Noted: 2023-03-03 | Resolved: 2025-05-22

## 2025-05-22 PROBLEM — L03.90 CELLULITIS: Status: RESOLVED | Noted: 2023-03-03 | Resolved: 2025-05-22

## 2025-05-22 PROBLEM — U07.1 DISEASE DUE TO SEVERE ACUTE RESPIRATORY SYNDROME CORONAVIRUS 2 (SARS-COV-2): Status: RESOLVED | Noted: 2024-02-15 | Resolved: 2025-05-22

## 2025-06-17 ENCOUNTER — APPOINTMENT (OUTPATIENT)
Dept: CARDIOLOGY | Facility: CLINIC | Age: 65
End: 2025-06-17
Payer: MEDICARE

## 2025-07-07 ENCOUNTER — APPOINTMENT (OUTPATIENT)
Dept: CARDIOLOGY | Facility: CLINIC | Age: 65
End: 2025-07-07
Payer: MEDICARE

## 2025-07-15 ENCOUNTER — APPOINTMENT (OUTPATIENT)
Dept: CARDIOLOGY | Facility: CLINIC | Age: 65
End: 2025-07-15
Payer: MEDICARE

## 2025-07-23 ENCOUNTER — APPOINTMENT (OUTPATIENT)
Facility: CLINIC | Age: 65
End: 2025-07-23
Payer: MEDICARE

## 2025-07-31 ENCOUNTER — OFFICE VISIT (OUTPATIENT)
Dept: CARDIOLOGY | Facility: CLINIC | Age: 65
End: 2025-07-31
Payer: MEDICARE

## 2025-07-31 VITALS
HEART RATE: 95 BPM | DIASTOLIC BLOOD PRESSURE: 82 MMHG | WEIGHT: 315 LBS | HEIGHT: 74 IN | BODY MASS INDEX: 40.43 KG/M2 | OXYGEN SATURATION: 96 % | SYSTOLIC BLOOD PRESSURE: 132 MMHG

## 2025-07-31 DIAGNOSIS — I50.32 CHRONIC DIASTOLIC (CONGESTIVE) HEART FAILURE: ICD-10-CM

## 2025-07-31 DIAGNOSIS — K22.0 ACHALASIA, ESOPHAGEAL: ICD-10-CM

## 2025-07-31 DIAGNOSIS — E66.01 MORBID OBESITY (MULTI): ICD-10-CM

## 2025-07-31 DIAGNOSIS — I50.32 CHRONIC HEART FAILURE WITH PRESERVED EJECTION FRACTION: ICD-10-CM

## 2025-07-31 DIAGNOSIS — S05.71XD: ICD-10-CM

## 2025-07-31 DIAGNOSIS — G47.33 OBSTRUCTIVE SLEEP APNEA OF ADULT: ICD-10-CM

## 2025-07-31 DIAGNOSIS — I48.21 PERMANENT ATRIAL FIBRILLATION (MULTI): Primary | ICD-10-CM

## 2025-07-31 DIAGNOSIS — I10 HYPERTENSION, UNSPECIFIED TYPE: ICD-10-CM

## 2025-07-31 DIAGNOSIS — E11.9 TYPE 2 DIABETES MELLITUS WITHOUT COMPLICATION, WITHOUT LONG-TERM CURRENT USE OF INSULIN: ICD-10-CM

## 2025-07-31 PROCEDURE — 3008F BODY MASS INDEX DOCD: CPT | Performed by: INTERNAL MEDICINE

## 2025-07-31 PROCEDURE — 93005 ELECTROCARDIOGRAM TRACING: CPT | Performed by: INTERNAL MEDICINE

## 2025-07-31 PROCEDURE — 3079F DIAST BP 80-89 MM HG: CPT | Performed by: INTERNAL MEDICINE

## 2025-07-31 PROCEDURE — 93010 ELECTROCARDIOGRAM REPORT: CPT | Performed by: INTERNAL MEDICINE

## 2025-07-31 PROCEDURE — 99214 OFFICE O/P EST MOD 30 MIN: CPT | Performed by: INTERNAL MEDICINE

## 2025-07-31 PROCEDURE — 99212 OFFICE O/P EST SF 10 MIN: CPT | Mod: 25

## 2025-07-31 PROCEDURE — 4010F ACE/ARB THERAPY RXD/TAKEN: CPT | Performed by: INTERNAL MEDICINE

## 2025-07-31 PROCEDURE — 3075F SYST BP GE 130 - 139MM HG: CPT | Performed by: INTERNAL MEDICINE

## 2025-07-31 NOTE — PROGRESS NOTES
"  Subjective  Nasir Sandoval  is a 65 y.o. year old male who presents HFpEF.  He as been vomiting for a week with H/O acahalasia.  Follow with GI doctor.  Doing radiation therapy for prostate cancer getting diarrhea from.  Erasmo appointmnet with pulmonary doctor.  Notes KRUGER at times     Blood pressure 150/80, pulse 95, height 1.88 m (6' 2\"), weight (!) 156 kg (345 lb), SpO2 96%.   Penicillins  Medical History[1]  Surgical History[2]  Family History[3]  @SOC    Current Medications[4]     ROS  Review of Systems   All other systems reviewed and are negative.      Physical Exam  Physical Exam  Constitutional:       Appearance: He is obese.   HENT:      Head: Normocephalic and atraumatic.     Cardiovascular:      Rate and Rhythm: Normal rate. Rhythm irregularly irregular.   Pulmonary:      Effort: Pulmonary effort is normal.      Comments:  breath sounds throughout  Abdominal:      Palpations: Abdomen is soft.     Musculoskeletal:      Right lower leg: No edema.      Left lower leg: No edema.     Skin:     General: Skin is warm and dry.     Neurological:      General: No focal deficit present.      Mental Status: He is alert and oriented to person, place, and time.     Psychiatric:         Mood and Affect: Mood normal.         Behavior: Behavior normal.          EKG  Encounter Date: 25   ECG 12 lead (Clinic Performed)   Result Value    Ventricular Rate 127    QRS Duration 82    QT Interval 310    QTC Calculation(Bazett) 450    R Axis -76    T Axis 88    QRS Count 21    Q Onset 215    T Offset 370    QTC Fredericia 398    Narrative    Atrial fibrillation with rapid ventricular response  Left axis deviation  Low voltage QRS  Inferior infarct (cited on or before 2025)  Possible Anterolateral infarct (cited on or before 30-MAY-2024)  Abnormal ECG  When compared with ECG of 2025 09:57,  No significant change was found  Confirmed by Dutch Milan ([id number]) on 2025 4:27:04 PM       Problem List Items " Addressed This Visit       (HFpEF) heart failure with preserved ejection fraction    AF (atrial fibrillation) (Multi) - Primary    7/31/25 EKG atrial fibrillation at 95/min., LAD, IRBBB p[attern         Relevant Orders    ECG 12 Lead    Hypertension    Traumatic enucleation of right eye    Achalasia, esophageal    Obstructive sleep apnea of adult    Chronic diastolic (congestive) heart failure    Morbid obesity (Multi)    Diabetes mellitus, type 2 (Multi)         Same meds  Return 3 months with EKG      Dutch Milan MD        [1]   Past Medical History:  Diagnosis Date    VALERIANO (acute kidney injury) 05/15/2012    Avulsion of left eye, initial encounter 11/30/2018    Traumatic enucleation of left eye    Avulsion of left eye, initial encounter 11/30/2018    Traumatic enucleation of left eye    Carcinoma in situ of prostate 10/31/2023    Epidermal cyst 10/31/2023    Esophageal dysphagia 10/31/2023    Heart failure 10/31/2023    Heart failure, unspecified 10/31/2023    Hypertension 2004    Hypotension 03/03/2023    Ingrowing toenail 10/31/2023    Nephrolithiasis 05/15/2012    Pain, unspecified 10/31/2023    Recurrent major depression 10/31/2023    Uric acid urolithiasis 10/31/2023    Vitreous degeneration, left eye 11/30/2018    PVD (posterior vitreous detachment), left eye    Vitreous degeneration, left eye 11/30/2018    PVD (posterior vitreous detachment), left eye   [2] History reviewed. No pertinent surgical history.  [3] No family history on file.  [4]   Current Outpatient Medications   Medication Sig Dispense Refill    albuterol 90 mcg/actuation inhaler Inhale 2 puffs every 6 hours if needed for shortness of breath.      ammonium lactate (Amlactin) 12 % cream Apply 1 Application topically if needed for dry skin (toes).      bacitracin 500 unit/gram ointment Apply 1 Application topically 2 times a day. (Patient not taking: Reported on 10/17/2024)      benzonatate (Tessalon) 100 mg capsule Take 1 capsule (100 mg)  by mouth 3 times a day as needed for cough. Do not crush or chew. (Patient not taking: Reported on 10/17/2024) 20 capsule 0    carboxymethylcellulose (Refresh Plus) 0.5 % ophthalmic solution Administer 1 drop into the left eye 3 times a day as needed for dry eyes.      cetirizine (ZyrTEC) 5 mg tablet Take 1 tablet (5 mg) by mouth once daily.      cholecalciferol (Vitamin D-3) 25 MCG (1000 UT) tablet Take 1 tablet (1,000 Units) by mouth once daily.      clindamycin (Cleocin T) 1 % external solution APPLY A SUFFICIENT AMOUNT EXTERNALLY TWICE A DAY TO SCROTUM AS NEEDED FOR LESION      digoxin (Lanoxin) 125 MCG tablet Take 1 tablet (125 mcg) by mouth once daily.      docosahexaenoic acid/epa (FISH OIL ORAL) Take 1 capsule by mouth 2 times a day.      docusate sodium (Colace) 100 mg capsule Take 2 capsules (200 mg) by mouth once daily in the morning.      DULoxetine (Cymbalta) 60 mg DR capsule Take 1 capsule (60 mg) by mouth once daily.      furosemide (Lasix) 40 mg tablet Take 3 tablets (120 mg) by mouth 2 times a day.      gabapentin (Neurontin) 400 mg capsule Take 2 capsules (800 mg) by mouth 3 times a day.      glipiZIDE (Glucotrol) 10 mg tablet Take 1 tablet (10 mg) by mouth 2 times a day before meals.      ketoconazole (NIZOral) 2 % shampoo SHAMPOO A SUFFICIENT AMOUNT EXTERNALLY EVERY OTHER DAY .  LATHER ONTO THE SCALP, FACE, BEHIND EARS FOR 5 MINUTES EVERY OTHER DAY  THEN RINSE .  LATHER ONTO THE SCALP, FACE, BEHIND EARS FOR 5 MINUTES EVERY OTHER DAY   THEN RINSE      KRILL OIL ORAL Take by mouth every 12 hours.      lisinopril 2.5 mg tablet Take 1 tablet (2.5 mg) by mouth once daily.      lubricating eye drops ophthalmic solution Administer 1 drop into the left eye 3 times a day as needed for dry eyes.      magnesium oxide (Mag-Ox) 400 mg (241.3 mg magnesium) tablet Take 1 tablet (400 mg) by mouth twice a day.      metFORMIN (Glucophage) 1,000 mg tablet Take 1 tablet (1,000 mg) by mouth 2 times daily (morning and  late afternoon).      multivitamin tablet Take 1 tablet by mouth once daily.      multivitamin with minerals tablet Take 1 tablet by mouth once daily.      omega 3-dha-epa-fish oil (Fish OiL) 1,000 mg (120 mg-180 mg) capsule Take 1 capsule (1,000 mg) by mouth 2 times a day.      potassium chloride CR (Klor-Con M20) 20 mEq ER tablet Take 2 tablets (40 mEq) by mouth 2 times a day.      potassium citrate CR (Urocit-K-5) 5 mEq ER tablet Take 3 tablets (15 mEq) by mouth once daily at bedtime. Do not crush, chew, or split.      psyllium (Metamucil) 3.4 gram packet Take by mouth.      psyllium husk (METAMUCIL ORAL) Take 1 Scoop by mouth once daily.      rosuvastatin (Crestor) 20 mg tablet Take 1 tablet (20 mg) by mouth once daily at bedtime.      spironolactone (Aldactone) 25 mg tablet Take 1 tablet (25 mg) by mouth once daily. 30 tablet 11    tamsulosin (Flomax) 0.4 mg 24 hr capsule Take 2 capsules (0.8 mg) by mouth once daily at bedtime.      topiramate (Topamax) 50 mg tablet Take 1 tablet (50 mg) by mouth 2 times a day. 60 tablet 11    warfarin (Coumadin) 10 mg tablet Take 1 tablet (10 mg) by mouth. Thursday      warfarin (Coumadin) 5 mg tablet Take 2.5 tablets (12.5 mg) by mouth. 6 times weekly       No current facility-administered medications for this visit.

## 2025-08-01 LAB
Q ONSET: 205 MS
QRS COUNT: 16 BEATS
QRS DURATION: 102 MS
QT INTERVAL: 378 MS
QTC CALCULATION(BAZETT): 475 MS
QTC FREDERICIA: 440 MS
R AXIS: -85 DEGREES
T AXIS: 75 DEGREES
T OFFSET: 394 MS
VENTRICULAR RATE: 95 BPM

## 2025-08-21 ENCOUNTER — APPOINTMENT (OUTPATIENT)
Facility: CLINIC | Age: 65
End: 2025-08-21
Payer: MEDICARE

## 2025-08-28 ENCOUNTER — APPOINTMENT (OUTPATIENT)
Facility: CLINIC | Age: 65
End: 2025-08-28
Payer: MEDICARE

## 2025-08-28 VITALS
TEMPERATURE: 97 F | SYSTOLIC BLOOD PRESSURE: 134 MMHG | HEART RATE: 103 BPM | HEIGHT: 74 IN | WEIGHT: 315 LBS | BODY MASS INDEX: 40.43 KG/M2 | OXYGEN SATURATION: 93 % | DIASTOLIC BLOOD PRESSURE: 91 MMHG

## 2025-08-28 DIAGNOSIS — R09.02 HYPOXIA: ICD-10-CM

## 2025-08-28 DIAGNOSIS — Z87.09 HISTORY OF ASTHMA: ICD-10-CM

## 2025-08-28 DIAGNOSIS — G47.33 OBSTRUCTIVE SLEEP APNEA OF ADULT: ICD-10-CM

## 2025-08-28 DIAGNOSIS — Z77.098 CHEMICAL EXPOSURE: ICD-10-CM

## 2025-08-28 DIAGNOSIS — I50.32 CHRONIC HEART FAILURE WITH PRESERVED EJECTION FRACTION: Primary | ICD-10-CM

## 2025-08-28 DIAGNOSIS — Z77.090 ASBESTOS EXPOSURE: ICD-10-CM

## 2025-08-28 DIAGNOSIS — R06.02 SHORTNESS OF BREATH: ICD-10-CM

## 2025-08-28 DIAGNOSIS — Z77.29 LONG-TERM EXPOSURE INVOLVING BIRD DROPPINGS: ICD-10-CM

## 2025-08-28 PROCEDURE — 99205 OFFICE O/P NEW HI 60 MIN: CPT | Performed by: NURSE PRACTITIONER

## 2025-08-28 PROCEDURE — 4010F ACE/ARB THERAPY RXD/TAKEN: CPT | Performed by: NURSE PRACTITIONER

## 2025-08-28 PROCEDURE — 3008F BODY MASS INDEX DOCD: CPT | Performed by: NURSE PRACTITIONER

## 2025-08-28 PROCEDURE — 3080F DIAST BP >= 90 MM HG: CPT | Performed by: NURSE PRACTITIONER

## 2025-08-28 PROCEDURE — 1036F TOBACCO NON-USER: CPT | Performed by: NURSE PRACTITIONER

## 2025-08-28 PROCEDURE — 3075F SYST BP GE 130 - 139MM HG: CPT | Performed by: NURSE PRACTITIONER

## 2025-08-28 RX ORDER — ALBUTEROL SULFATE 90 UG/1
1 INHALANT RESPIRATORY (INHALATION) ONCE
OUTPATIENT
Start: 2025-08-28

## 2025-08-28 RX ORDER — ALBUTEROL SULFATE 0.83 MG/ML
3 SOLUTION RESPIRATORY (INHALATION) ONCE
OUTPATIENT
Start: 2025-08-28 | End: 2025-08-28

## 2025-08-28 RX ORDER — MUCUS CLEARING DEVICE
1 EACH MISCELLANEOUS 2 TIMES DAILY
Qty: 1 EACH | Refills: 0 | Status: SHIPPED | OUTPATIENT
Start: 2025-08-28

## 2025-08-28 ASSESSMENT — PATIENT HEALTH QUESTIONNAIRE - PHQ9
2. FEELING DOWN, DEPRESSED OR HOPELESS: NOT AT ALL
1. LITTLE INTEREST OR PLEASURE IN DOING THINGS: NOT AT ALL
SUM OF ALL RESPONSES TO PHQ9 QUESTIONS 1 AND 2: 0

## 2025-08-28 ASSESSMENT — ENCOUNTER SYMPTOMS
NAUSEA: 1
SINUS PRESSURE: 0
WHEEZING: 1
ACTIVITY CHANGE: 1
APNEA: 1
DIZZINESS: 1
RHINORRHEA: 0
FATIGUE: 1
COUGH: 1
ABDOMINAL DISTENTION: 0
SINUS PAIN: 0
SHORTNESS OF BREATH: 1

## 2025-08-28 ASSESSMENT — PAIN SCALES - GENERAL: PAINLEVEL_OUTOF10: 5

## 2025-08-29 ENCOUNTER — TELEPHONE (OUTPATIENT)
Facility: CLINIC | Age: 65
End: 2025-08-29
Payer: MEDICARE

## 2025-09-02 DIAGNOSIS — R09.02 HYPOXIA: ICD-10-CM

## 2025-09-02 DIAGNOSIS — Z87.09 HISTORY OF ASTHMA: ICD-10-CM

## 2025-09-02 DIAGNOSIS — R06.02 SHORTNESS OF BREATH: ICD-10-CM

## 2025-09-02 RX ORDER — MUCUS CLEARING DEVICE
1 EACH MISCELLANEOUS 2 TIMES DAILY
Qty: 1 EACH | Refills: 0 | Status: SHIPPED | OUTPATIENT
Start: 2025-09-02

## 2025-09-05 ENCOUNTER — APPOINTMENT (OUTPATIENT)
Dept: RESPIRATORY THERAPY | Facility: HOSPITAL | Age: 65
End: 2025-09-05
Payer: MEDICARE

## 2025-09-05 ENCOUNTER — HOSPITAL ENCOUNTER (OUTPATIENT)
Dept: RADIOLOGY | Facility: CLINIC | Age: 65
Discharge: HOME | End: 2025-09-05
Payer: MEDICARE

## 2025-09-05 DIAGNOSIS — Z77.090 ASBESTOS EXPOSURE: ICD-10-CM

## 2025-09-05 DIAGNOSIS — Z77.29 LONG-TERM EXPOSURE INVOLVING BIRD DROPPINGS: ICD-10-CM

## 2025-09-05 DIAGNOSIS — R06.02 SHORTNESS OF BREATH: ICD-10-CM

## 2025-09-05 DIAGNOSIS — R09.02 HYPOXIA: ICD-10-CM

## 2025-09-05 DIAGNOSIS — Z77.098 CHEMICAL EXPOSURE: ICD-10-CM

## 2025-09-05 PROCEDURE — 71250 CT THORAX DX C-: CPT

## 2025-09-05 PROCEDURE — 71250 CT THORAX DX C-: CPT | Performed by: RADIOLOGY

## 2025-09-10 ENCOUNTER — APPOINTMENT (OUTPATIENT)
Facility: CLINIC | Age: 65
End: 2025-09-10
Payer: MEDICARE

## 2025-09-23 ENCOUNTER — APPOINTMENT (OUTPATIENT)
Dept: OPHTHALMOLOGY | Facility: CLINIC | Age: 65
End: 2025-09-23
Payer: MEDICARE

## 2025-10-02 ENCOUNTER — APPOINTMENT (OUTPATIENT)
Facility: CLINIC | Age: 65
End: 2025-10-02
Payer: MEDICARE

## 2026-03-13 ENCOUNTER — APPOINTMENT (OUTPATIENT)
Dept: OPHTHALMOLOGY | Facility: CLINIC | Age: 66
End: 2026-03-13
Payer: MEDICARE